# Patient Record
Sex: FEMALE | Race: WHITE | NOT HISPANIC OR LATINO | Employment: UNEMPLOYED | URBAN - METROPOLITAN AREA
[De-identification: names, ages, dates, MRNs, and addresses within clinical notes are randomized per-mention and may not be internally consistent; named-entity substitution may affect disease eponyms.]

---

## 2017-04-25 ENCOUNTER — HOSPITAL ENCOUNTER (EMERGENCY)
Facility: HOSPITAL | Age: 37
Discharge: HOME | End: 2017-04-25
Admitting: EMERGENCY MEDICINE

## 2017-04-25 ENCOUNTER — APPOINTMENT (EMERGENCY)
Dept: RADIOLOGY | Facility: HOSPITAL | Age: 37
End: 2017-04-25

## 2017-04-25 ENCOUNTER — APPOINTMENT (EMERGENCY)
Dept: CT IMAGING | Facility: HOSPITAL | Age: 37
End: 2017-04-25

## 2017-04-25 VITALS
TEMPERATURE: 98.2 F | SYSTOLIC BLOOD PRESSURE: 108 MMHG | BODY MASS INDEX: 45.19 KG/M2 | HEART RATE: 73 BPM | RESPIRATION RATE: 16 BRPM | WEIGHT: 280 LBS | OXYGEN SATURATION: 98 % | DIASTOLIC BLOOD PRESSURE: 62 MMHG

## 2017-04-25 DIAGNOSIS — S09.90XA HEAD INJURY, ACUTE, WITHOUT LOSS OF CONSCIOUSNESS, INITIAL ENCOUNTER: ICD-10-CM

## 2017-04-25 DIAGNOSIS — S89.92XA LEFT KNEE INJURY, INITIAL ENCOUNTER: Primary | ICD-10-CM

## 2017-04-25 PROCEDURE — 70450 CT HEAD/BRAIN W/O DYE: CPT

## 2017-04-25 PROCEDURE — 73564 X-RAY EXAM KNEE 4 OR MORE: CPT

## 2017-04-25 PROCEDURE — 99284 EMERGENCY DEPT VISIT MOD MDM: CPT

## 2017-04-25 RX ORDER — ACETAMINOPHEN 325 MG/1
TABLET ORAL
Status: COMPLETED
Start: 2017-04-25 | End: 2017-04-25

## 2017-04-25 RX ORDER — ACETAMINOPHEN 325 MG/1
650 TABLET ORAL ONCE
Status: COMPLETED | OUTPATIENT
Start: 2017-04-25 | End: 2017-04-25

## 2017-04-25 RX ADMIN — ACETAMINOPHEN 650 MG: 325 TABLET ORAL at 13:05

## 2017-04-25 RX ADMIN — ACETAMINOPHEN 650 MG: 325 TABLET, FILM COATED ORAL at 13:05

## 2017-06-16 ENCOUNTER — TRANSCRIBE ORDERS (OUTPATIENT)
Dept: LAB | Facility: CLINIC | Age: 37
End: 2017-06-16

## 2017-06-16 ENCOUNTER — GENERIC CONVERSION - ENCOUNTER (OUTPATIENT)
Dept: OTHER | Facility: OTHER | Age: 37
End: 2017-06-16

## 2017-06-16 ENCOUNTER — APPOINTMENT (OUTPATIENT)
Dept: LAB | Facility: CLINIC | Age: 37
End: 2017-06-16
Payer: COMMERCIAL

## 2017-06-16 DIAGNOSIS — E66.01 MORBID (SEVERE) OBESITY DUE TO EXCESS CALORIES (HCC): ICD-10-CM

## 2017-06-16 DIAGNOSIS — R60.0 LOCALIZED EDEMA: ICD-10-CM

## 2017-06-16 DIAGNOSIS — R53.83 OTHER FATIGUE: ICD-10-CM

## 2017-06-16 DIAGNOSIS — E78.5 HYPERLIPIDEMIA: ICD-10-CM

## 2017-06-16 DIAGNOSIS — E55.9 VITAMIN D DEFICIENCY: ICD-10-CM

## 2017-06-16 DIAGNOSIS — D64.9 ANEMIA: ICD-10-CM

## 2017-06-16 LAB
25(OH)D3 SERPL-MCNC: 15.3 NG/ML (ref 30–100)
ALBUMIN SERPL BCP-MCNC: 3.3 G/DL (ref 3.5–5)
ALP SERPL-CCNC: 93 U/L (ref 46–116)
ALT SERPL W P-5'-P-CCNC: 55 U/L (ref 12–78)
ANION GAP SERPL CALCULATED.3IONS-SCNC: 6 MMOL/L (ref 4–13)
AST SERPL W P-5'-P-CCNC: 41 U/L (ref 5–45)
BASOPHILS # BLD AUTO: 0.03 THOUSANDS/ΜL (ref 0–0.1)
BASOPHILS NFR BLD AUTO: 0 % (ref 0–1)
BILIRUB SERPL-MCNC: 0.34 MG/DL (ref 0.2–1)
BUN SERPL-MCNC: 10 MG/DL (ref 5–25)
CALCIUM SERPL-MCNC: 9.2 MG/DL (ref 8.3–10.1)
CHLORIDE SERPL-SCNC: 105 MMOL/L (ref 100–108)
CHOLEST SERPL-MCNC: 196 MG/DL (ref 50–200)
CO2 SERPL-SCNC: 29 MMOL/L (ref 21–32)
CREAT SERPL-MCNC: 0.8 MG/DL (ref 0.6–1.3)
EOSINOPHIL # BLD AUTO: 0.17 THOUSAND/ΜL (ref 0–0.61)
EOSINOPHIL NFR BLD AUTO: 2 % (ref 0–6)
ERYTHROCYTE [DISTWIDTH] IN BLOOD BY AUTOMATED COUNT: 14.8 % (ref 11.6–15.1)
EST. AVERAGE GLUCOSE BLD GHB EST-MCNC: 120 MG/DL
GFR SERPL CREATININE-BSD FRML MDRD: >60 ML/MIN/1.73SQ M
GLUCOSE P FAST SERPL-MCNC: 86 MG/DL (ref 65–99)
HBA1C MFR BLD: 5.8 % (ref 4.2–6.3)
HCT VFR BLD AUTO: 43.6 % (ref 34.8–46.1)
HDLC SERPL-MCNC: 31 MG/DL (ref 40–60)
HGB BLD-MCNC: 13.5 G/DL (ref 11.5–15.4)
LDLC SERPL CALC-MCNC: 113 MG/DL (ref 0–100)
LYMPHOCYTES # BLD AUTO: 3.08 THOUSANDS/ΜL (ref 0.6–4.47)
LYMPHOCYTES NFR BLD AUTO: 40 % (ref 14–44)
MCH RBC QN AUTO: 26.1 PG (ref 26.8–34.3)
MCHC RBC AUTO-ENTMCNC: 31 G/DL (ref 31.4–37.4)
MCV RBC AUTO: 84 FL (ref 82–98)
MONOCYTES # BLD AUTO: 0.47 THOUSAND/ΜL (ref 0.17–1.22)
MONOCYTES NFR BLD AUTO: 6 % (ref 4–12)
NEUTROPHILS # BLD AUTO: 3.92 THOUSANDS/ΜL (ref 1.85–7.62)
NEUTS SEG NFR BLD AUTO: 52 % (ref 43–75)
NRBC BLD AUTO-RTO: 0 /100 WBCS
PLATELET # BLD AUTO: 304 THOUSANDS/UL (ref 149–390)
PMV BLD AUTO: 9.2 FL (ref 8.9–12.7)
POTASSIUM SERPL-SCNC: 4 MMOL/L (ref 3.5–5.3)
PROT SERPL-MCNC: 7.5 G/DL (ref 6.4–8.2)
RBC # BLD AUTO: 5.18 MILLION/UL (ref 3.81–5.12)
SODIUM SERPL-SCNC: 140 MMOL/L (ref 136–145)
TRIGL SERPL-MCNC: 261 MG/DL
TSH SERPL DL<=0.05 MIU/L-ACNC: 2.37 UIU/ML (ref 0.36–3.74)
WBC # BLD AUTO: 7.67 THOUSAND/UL (ref 4.31–10.16)

## 2017-06-16 PROCEDURE — 36415 COLL VENOUS BLD VENIPUNCTURE: CPT

## 2017-06-16 PROCEDURE — 82306 VITAMIN D 25 HYDROXY: CPT

## 2017-06-16 PROCEDURE — 80061 LIPID PANEL: CPT

## 2017-06-16 PROCEDURE — 83036 HEMOGLOBIN GLYCOSYLATED A1C: CPT

## 2017-06-16 PROCEDURE — 84443 ASSAY THYROID STIM HORMONE: CPT

## 2017-06-16 PROCEDURE — 80053 COMPREHEN METABOLIC PANEL: CPT

## 2017-06-16 PROCEDURE — 85025 COMPLETE CBC W/AUTO DIFF WBC: CPT

## 2017-06-19 ENCOUNTER — GENERIC CONVERSION - ENCOUNTER (OUTPATIENT)
Dept: OTHER | Facility: OTHER | Age: 37
End: 2017-06-19

## 2017-06-20 ENCOUNTER — ALLSCRIPTS OFFICE VISIT (OUTPATIENT)
Dept: OTHER | Facility: OTHER | Age: 37
End: 2017-06-20

## 2017-06-22 ENCOUNTER — ALLSCRIPTS OFFICE VISIT (OUTPATIENT)
Dept: OTHER | Facility: OTHER | Age: 37
End: 2017-06-22

## 2017-06-28 ENCOUNTER — TRANSCRIBE ORDERS (OUTPATIENT)
Dept: ADMINISTRATIVE | Facility: HOSPITAL | Age: 37
End: 2017-06-28

## 2017-06-28 DIAGNOSIS — J45.909 EXTRINSIC ASTHMA, UNSPECIFIED: Primary | ICD-10-CM

## 2017-07-11 ENCOUNTER — GENERIC CONVERSION - ENCOUNTER (OUTPATIENT)
Dept: OTHER | Facility: OTHER | Age: 37
End: 2017-07-11

## 2017-07-11 ENCOUNTER — APPOINTMENT (OUTPATIENT)
Dept: LAB | Facility: HOSPITAL | Age: 37
End: 2017-07-11
Payer: COMMERCIAL

## 2017-07-11 ENCOUNTER — HOSPITAL ENCOUNTER (OUTPATIENT)
Dept: PULMONOLOGY | Facility: HOSPITAL | Age: 37
Discharge: HOME | End: 2017-07-11
Payer: COMMERCIAL

## 2017-07-11 DIAGNOSIS — R53.83 OTHER FATIGUE: ICD-10-CM

## 2017-07-11 DIAGNOSIS — J45.909 EXTRINSIC ASTHMA, UNSPECIFIED: ICD-10-CM

## 2017-07-11 LAB
FOLATE SERPL-MCNC: >20 NG/ML (ref 3.1–17.5)
VIT B12 SERPL-MCNC: 548 PG/ML (ref 100–900)

## 2017-07-11 PROCEDURE — 36415 COLL VENOUS BLD VENIPUNCTURE: CPT

## 2017-07-11 PROCEDURE — 94726 PLETHYSMOGRAPHY LUNG VOLUMES: CPT

## 2017-07-11 PROCEDURE — 82607 VITAMIN B-12: CPT

## 2017-07-11 PROCEDURE — 94010 BREATHING CAPACITY TEST: CPT

## 2017-07-11 PROCEDURE — 94760 N-INVAS EAR/PLS OXIMETRY 1: CPT

## 2017-07-11 PROCEDURE — 82746 ASSAY OF FOLIC ACID SERUM: CPT

## 2017-07-11 PROCEDURE — 94729 DIFFUSING CAPACITY: CPT

## 2017-07-11 RX ORDER — ALBUTEROL SULFATE 2.5 MG/3ML
2.5 SOLUTION RESPIRATORY (INHALATION) ONCE AS NEEDED
Status: DISCONTINUED | OUTPATIENT
Start: 2017-07-11 | End: 2017-07-15 | Stop reason: HOSPADM

## 2017-07-31 ENCOUNTER — ALLSCRIPTS OFFICE VISIT (OUTPATIENT)
Dept: OTHER | Facility: OTHER | Age: 37
End: 2017-07-31

## 2017-07-31 ENCOUNTER — APPOINTMENT (OUTPATIENT)
Dept: LAB | Facility: CLINIC | Age: 37
End: 2017-07-31
Payer: COMMERCIAL

## 2017-07-31 ENCOUNTER — TRANSCRIBE ORDERS (OUTPATIENT)
Dept: LAB | Facility: CLINIC | Age: 37
End: 2017-07-31

## 2017-07-31 DIAGNOSIS — M79.609 PAIN IN EXTREMITY: ICD-10-CM

## 2017-07-31 DIAGNOSIS — I26.99 OTHER PULMONARY EMBOLISM WITHOUT ACUTE COR PULMONALE (HCC): ICD-10-CM

## 2017-07-31 DIAGNOSIS — R60.0 LOCALIZED EDEMA: ICD-10-CM

## 2017-07-31 DIAGNOSIS — B19.20 VIRAL HEPATITIS C WITHOUT HEPATIC COMA: ICD-10-CM

## 2017-07-31 DIAGNOSIS — R00.2 PALPITATIONS: ICD-10-CM

## 2017-07-31 PROCEDURE — 86038 ANTINUCLEAR ANTIBODIES: CPT

## 2017-07-31 PROCEDURE — 36415 COLL VENOUS BLD VENIPUNCTURE: CPT

## 2017-07-31 PROCEDURE — 87522 HEPATITIS C REVRS TRNSCRPJ: CPT

## 2017-08-02 LAB
HCV RNA SERPL NAA+PROBE-ACNC: NORMAL IU/ML
RYE IGE QN: NEGATIVE
TEST INFORMATION: NORMAL

## 2017-08-06 ENCOUNTER — HOSPITAL ENCOUNTER (EMERGENCY)
Facility: HOSPITAL | Age: 37
Discharge: HOME | End: 2017-08-07
Attending: EMERGENCY MEDICINE
Payer: COMMERCIAL

## 2017-08-06 ENCOUNTER — GENERIC CONVERSION - ENCOUNTER (OUTPATIENT)
Dept: OTHER | Facility: OTHER | Age: 37
End: 2017-08-06

## 2017-08-06 ENCOUNTER — APPOINTMENT (EMERGENCY)
Dept: RADIOLOGY | Facility: HOSPITAL | Age: 37
End: 2017-08-06
Payer: COMMERCIAL

## 2017-08-06 ENCOUNTER — APPOINTMENT (EMERGENCY)
Dept: CT IMAGING | Facility: HOSPITAL | Age: 37
End: 2017-08-06
Payer: COMMERCIAL

## 2017-08-06 DIAGNOSIS — R06.02 SHORTNESS OF BREATH: ICD-10-CM

## 2017-08-06 DIAGNOSIS — L73.9 FOLLICULITIS: ICD-10-CM

## 2017-08-06 DIAGNOSIS — S80.12XA CONTUSION OF LEFT LEG, INITIAL ENCOUNTER: Primary | ICD-10-CM

## 2017-08-06 DIAGNOSIS — S80.12XA TRAUMATIC HEMATOMA OF LEFT LOWER LEG, INITIAL ENCOUNTER: ICD-10-CM

## 2017-08-06 LAB
ALBUMIN SERPL BCP-MCNC: 3.4 G/DL (ref 3.5–5)
ALP SERPL-CCNC: 110 U/L (ref 46–116)
ALT SERPL W P-5'-P-CCNC: 55 U/L (ref 12–78)
ANION GAP SERPL CALCULATED.3IONS-SCNC: 6 MMOL/L (ref 4–13)
APTT PPP: 33 SECONDS (ref 23–35)
AST SERPL W P-5'-P-CCNC: 36 U/L (ref 5–45)
BASOPHILS # BLD AUTO: 0.04 THOUSANDS/ΜL (ref 0–0.1)
BASOPHILS NFR BLD AUTO: 0 % (ref 0–1)
BILIRUB SERPL-MCNC: 0.23 MG/DL (ref 0.2–1)
BUN SERPL-MCNC: 13 MG/DL (ref 5–25)
CALCIUM SERPL-MCNC: 9.2 MG/DL (ref 8.3–10.1)
CHLORIDE SERPL-SCNC: 103 MMOL/L (ref 100–108)
CO2 SERPL-SCNC: 30 MMOL/L (ref 21–32)
CREAT SERPL-MCNC: 0.94 MG/DL (ref 0.6–1.3)
EOSINOPHIL # BLD AUTO: 0.2 THOUSAND/ΜL (ref 0–0.61)
EOSINOPHIL NFR BLD AUTO: 2 % (ref 0–6)
ERYTHROCYTE [DISTWIDTH] IN BLOOD BY AUTOMATED COUNT: 15.1 % (ref 11.6–15.1)
GFR SERPL CREATININE-BSD FRML MDRD: 77 ML/MIN/1.73SQ M
GLUCOSE SERPL-MCNC: 87 MG/DL (ref 65–140)
HCT VFR BLD AUTO: 41.2 % (ref 34.8–46.1)
HGB BLD-MCNC: 13.2 G/DL (ref 11.5–15.4)
INR PPP: 0.96 (ref 0.86–1.16)
LYMPHOCYTES # BLD AUTO: 3.96 THOUSANDS/ΜL (ref 0.6–4.47)
LYMPHOCYTES NFR BLD AUTO: 42 % (ref 14–44)
MCH RBC QN AUTO: 26.6 PG (ref 26.8–34.3)
MCHC RBC AUTO-ENTMCNC: 32 G/DL (ref 31.4–37.4)
MCV RBC AUTO: 83 FL (ref 82–98)
MONOCYTES # BLD AUTO: 0.64 THOUSAND/ΜL (ref 0.17–1.22)
MONOCYTES NFR BLD AUTO: 7 % (ref 4–12)
NEUTROPHILS # BLD AUTO: 4.6 THOUSANDS/ΜL (ref 1.85–7.62)
NEUTS SEG NFR BLD AUTO: 49 % (ref 43–75)
NRBC BLD AUTO-RTO: 0 /100 WBCS
PLATELET # BLD AUTO: 293 THOUSANDS/UL (ref 149–390)
PMV BLD AUTO: 9.4 FL (ref 8.9–12.7)
POTASSIUM SERPL-SCNC: 3.7 MMOL/L (ref 3.5–5.3)
PROT SERPL-MCNC: 7.8 G/DL (ref 6.4–8.2)
PROTHROMBIN TIME: 12.8 SECONDS (ref 12.1–14.4)
RBC # BLD AUTO: 4.96 MILLION/UL (ref 3.81–5.12)
SODIUM SERPL-SCNC: 139 MMOL/L (ref 136–145)
WBC # BLD AUTO: 9.44 THOUSAND/UL (ref 4.31–10.16)

## 2017-08-06 PROCEDURE — 80053 COMPREHEN METABOLIC PANEL: CPT | Performed by: EMERGENCY MEDICINE

## 2017-08-06 PROCEDURE — 73590 X-RAY EXAM OF LOWER LEG: CPT

## 2017-08-06 PROCEDURE — 96374 THER/PROPH/DIAG INJ IV PUSH: CPT

## 2017-08-06 PROCEDURE — 85025 COMPLETE CBC W/AUTO DIFF WBC: CPT | Performed by: EMERGENCY MEDICINE

## 2017-08-06 PROCEDURE — 85730 THROMBOPLASTIN TIME PARTIAL: CPT | Performed by: EMERGENCY MEDICINE

## 2017-08-06 PROCEDURE — 71275 CT ANGIOGRAPHY CHEST: CPT

## 2017-08-06 PROCEDURE — 96361 HYDRATE IV INFUSION ADD-ON: CPT

## 2017-08-06 PROCEDURE — 36415 COLL VENOUS BLD VENIPUNCTURE: CPT | Performed by: EMERGENCY MEDICINE

## 2017-08-06 PROCEDURE — 85610 PROTHROMBIN TIME: CPT | Performed by: EMERGENCY MEDICINE

## 2017-08-06 RX ORDER — KETOROLAC TROMETHAMINE 30 MG/ML
30 INJECTION, SOLUTION INTRAMUSCULAR; INTRAVENOUS ONCE
Status: COMPLETED | OUTPATIENT
Start: 2017-08-06 | End: 2017-08-06

## 2017-08-06 RX ADMIN — KETOROLAC TROMETHAMINE 30 MG: 30 INJECTION, SOLUTION INTRAMUSCULAR at 22:37

## 2017-08-06 RX ADMIN — SODIUM CHLORIDE 1000 ML: 0.9 INJECTION, SOLUTION INTRAVENOUS at 22:35

## 2017-08-06 RX ADMIN — IOHEXOL 100 ML: 350 INJECTION, SOLUTION INTRAVENOUS at 23:39

## 2017-08-07 VITALS
RESPIRATION RATE: 18 BRPM | HEART RATE: 69 BPM | BODY MASS INDEX: 48.26 KG/M2 | TEMPERATURE: 98.3 F | WEIGHT: 293 LBS | DIASTOLIC BLOOD PRESSURE: 59 MMHG | SYSTOLIC BLOOD PRESSURE: 100 MMHG | OXYGEN SATURATION: 97 %

## 2017-08-07 PROCEDURE — 99284 EMERGENCY DEPT VISIT MOD MDM: CPT

## 2017-08-11 ENCOUNTER — APPOINTMENT (EMERGENCY)
Dept: NON INVASIVE DIAGNOSTICS | Facility: HOSPITAL | Age: 37
End: 2017-08-11
Payer: COMMERCIAL

## 2017-08-11 ENCOUNTER — HOSPITAL ENCOUNTER (EMERGENCY)
Facility: HOSPITAL | Age: 37
Discharge: HOME | End: 2017-08-11
Attending: EMERGENCY MEDICINE | Admitting: EMERGENCY MEDICINE
Payer: COMMERCIAL

## 2017-08-11 VITALS
TEMPERATURE: 98.1 F | BODY MASS INDEX: 45.99 KG/M2 | OXYGEN SATURATION: 97 % | RESPIRATION RATE: 18 BRPM | SYSTOLIC BLOOD PRESSURE: 114 MMHG | HEIGHT: 67 IN | DIASTOLIC BLOOD PRESSURE: 67 MMHG | HEART RATE: 86 BPM | WEIGHT: 293 LBS

## 2017-08-11 DIAGNOSIS — L03.116 CELLULITIS OF LEFT LOWER EXTREMITY: Primary | ICD-10-CM

## 2017-08-11 LAB
ANION GAP SERPL CALCULATED.3IONS-SCNC: 7 MMOL/L (ref 4–13)
BASOPHILS # BLD AUTO: 0.06 THOUSANDS/ΜL (ref 0–0.1)
BASOPHILS NFR BLD AUTO: 1 % (ref 0–1)
BUN SERPL-MCNC: 12 MG/DL (ref 5–25)
CALCIUM SERPL-MCNC: 8.9 MG/DL (ref 8.3–10.1)
CHLORIDE SERPL-SCNC: 102 MMOL/L (ref 100–108)
CO2 SERPL-SCNC: 27 MMOL/L (ref 21–32)
CREAT SERPL-MCNC: 0.91 MG/DL (ref 0.6–1.3)
EOSINOPHIL # BLD AUTO: 0.23 THOUSAND/ΜL (ref 0–0.61)
EOSINOPHIL NFR BLD AUTO: 2 % (ref 0–6)
ERYTHROCYTE [DISTWIDTH] IN BLOOD BY AUTOMATED COUNT: 15 % (ref 11.6–15.1)
GFR SERPL CREATININE-BSD FRML MDRD: 80 ML/MIN/1.73SQ M
GLUCOSE SERPL-MCNC: 96 MG/DL (ref 65–140)
HCT VFR BLD AUTO: 39.7 % (ref 34.8–46.1)
HGB BLD-MCNC: 13.2 G/DL (ref 11.5–15.4)
LYMPHOCYTES # BLD AUTO: 3.51 THOUSANDS/ΜL (ref 0.6–4.47)
LYMPHOCYTES NFR BLD AUTO: 37 % (ref 14–44)
MCH RBC QN AUTO: 27.1 PG (ref 26.8–34.3)
MCHC RBC AUTO-ENTMCNC: 33.2 G/DL (ref 31.4–37.4)
MCV RBC AUTO: 82 FL (ref 82–98)
MONOCYTES # BLD AUTO: 0.7 THOUSAND/ΜL (ref 0.17–1.22)
MONOCYTES NFR BLD AUTO: 7 % (ref 4–12)
NEUTROPHILS # BLD AUTO: 5.07 THOUSANDS/ΜL (ref 1.85–7.62)
NEUTS SEG NFR BLD AUTO: 53 % (ref 43–75)
NRBC BLD AUTO-RTO: 0 /100 WBCS
PLATELET # BLD AUTO: 331 THOUSANDS/UL (ref 149–390)
PMV BLD AUTO: 9 FL (ref 8.9–12.7)
POTASSIUM SERPL-SCNC: 3.9 MMOL/L (ref 3.5–5.3)
RBC # BLD AUTO: 4.87 MILLION/UL (ref 3.81–5.12)
SODIUM SERPL-SCNC: 136 MMOL/L (ref 136–145)
WBC # BLD AUTO: 9.58 THOUSAND/UL (ref 4.31–10.16)

## 2017-08-11 PROCEDURE — 85025 COMPLETE CBC W/AUTO DIFF WBC: CPT | Performed by: EMERGENCY MEDICINE

## 2017-08-11 PROCEDURE — 96365 THER/PROPH/DIAG IV INF INIT: CPT

## 2017-08-11 PROCEDURE — 36415 COLL VENOUS BLD VENIPUNCTURE: CPT | Performed by: EMERGENCY MEDICINE

## 2017-08-11 PROCEDURE — 80048 BASIC METABOLIC PNL TOTAL CA: CPT | Performed by: EMERGENCY MEDICINE

## 2017-08-11 PROCEDURE — 93971 EXTREMITY STUDY: CPT

## 2017-08-11 PROCEDURE — 99284 EMERGENCY DEPT VISIT MOD MDM: CPT

## 2017-08-11 RX ORDER — CLINDAMYCIN HYDROCHLORIDE 300 MG/1
300 CAPSULE ORAL EVERY 6 HOURS SCHEDULED
Qty: 40 CAPSULE | Refills: 0 | Status: SHIPPED | OUTPATIENT
Start: 2017-08-11 | End: 2017-08-21

## 2017-08-11 RX ORDER — CLINDAMYCIN PHOSPHATE 600 MG/50ML
600 INJECTION INTRAVENOUS ONCE
Status: COMPLETED | OUTPATIENT
Start: 2017-08-11 | End: 2017-08-11

## 2017-08-11 RX ADMIN — CLINDAMYCIN PHOSPHATE 600 MG: 12 INJECTION, SOLUTION INTRAMUSCULAR; INTRAVENOUS at 17:45

## 2017-10-08 ENCOUNTER — HOSPITAL ENCOUNTER (EMERGENCY)
Facility: HOSPITAL | Age: 37
Discharge: HOME | End: 2017-10-08
Attending: EMERGENCY MEDICINE | Admitting: EMERGENCY MEDICINE
Payer: COMMERCIAL

## 2017-10-08 VITALS
SYSTOLIC BLOOD PRESSURE: 106 MMHG | TEMPERATURE: 98.2 F | RESPIRATION RATE: 16 BRPM | BODY MASS INDEX: 46.67 KG/M2 | HEART RATE: 84 BPM | OXYGEN SATURATION: 97 % | WEIGHT: 293 LBS | DIASTOLIC BLOOD PRESSURE: 69 MMHG

## 2017-10-08 DIAGNOSIS — M54.31 RIGHT SIDED SCIATICA: Primary | ICD-10-CM

## 2017-10-08 DIAGNOSIS — I82.401 RIGHT LEG DVT (HCC): ICD-10-CM

## 2017-10-08 DIAGNOSIS — S86.911A KNEE STRAIN, RIGHT, INITIAL ENCOUNTER: ICD-10-CM

## 2017-10-08 PROCEDURE — 99283 EMERGENCY DEPT VISIT LOW MDM: CPT

## 2017-10-08 RX ORDER — LIDOCAINE 50 MG/G
1 PATCH TOPICAL ONCE
Status: DISCONTINUED | OUTPATIENT
Start: 2017-10-08 | End: 2017-10-08 | Stop reason: HOSPADM

## 2017-10-08 RX ORDER — METHOCARBAMOL 500 MG/1
1000 TABLET, FILM COATED ORAL 3 TIMES DAILY PRN
Qty: 30 TABLET | Refills: 0 | Status: SHIPPED | OUTPATIENT
Start: 2017-10-08 | End: 2018-08-05 | Stop reason: ALTCHOICE

## 2017-10-08 RX ORDER — FAMOTIDINE 10 MG
10 TABLET ORAL 2 TIMES DAILY
COMMUNITY
End: 2018-08-05 | Stop reason: ALTCHOICE

## 2017-10-08 RX ORDER — LIDOCAINE 50 MG/G
1 PATCH TOPICAL DAILY
Qty: 15 PATCH | Refills: 0 | Status: SHIPPED | OUTPATIENT
Start: 2017-10-08 | End: 2018-08-05 | Stop reason: ALTCHOICE

## 2017-10-08 RX ORDER — GABAPENTIN 100 MG/1
100 CAPSULE ORAL 3 TIMES DAILY
Qty: 30 CAPSULE | Refills: 0 | Status: SHIPPED | OUTPATIENT
Start: 2017-10-08 | End: 2018-08-05 | Stop reason: ALTCHOICE

## 2017-10-08 RX ADMIN — LIDOCAINE 1 PATCH: 50 PATCH CUTANEOUS at 15:11

## 2017-10-08 NOTE — ED PROVIDER NOTES
History  Chief Complaint   Patient presents with    Knee Injury     pt states that she fell on her right knee on thrusday and was seen at [place] [address] for the injury   pt states that during the eval they found a blood clot in her right lower leg  pt now c/o sever burning and pain in her right knee and it is hard to walk or bend the knee       61-year-old female presents for evaluation of right knee pain which began approximately 6 days ago  The patient states that she had fallen getting out of the car and landed directly on her flexed right knee  She was seen at [facility] on October 4th where she was diagnosed with a blood clot in the leg  There was no evidence of fracture or dislocation on the x-rays  The patient was already on Eliquis for previous history of blood clot  The patient denies any shortness of breath and she had a CT of her chest which also ruled out PE up on reading the records from [facility]  The patient has persistent worsening pain of the anterior aspect of the right knee and right shin which she describes as burning  There is some associated anterior swelling  The patient also has some associated pain on the lateral aspect of her right thigh  She states the pain is worse with movement and ambulation  History provided by:  Patient      Prior to Admission Medications   Prescriptions Last Dose Informant Patient Reported? Taking? apixaban (ELIQUIS) 5 mg   Yes No   Sig: Take 5 mg by mouth 2 (two) times a day     famotidine (PEPCID) 10 mg tablet   Yes Yes   Sig: Take 10 mg by mouth 2 (two) times a day   methadone (DOLOPHINE) 10 mg tablet   Yes No   Sig: Take 150 mg by mouth daily,   mupirocin (BACTROBAN) 2 % ointment   No No   Sig: Apply topically 3 (three) times a day for 7 days      Facility-Administered Medications: None       Past Medical History:   Diagnosis Date    Asthma     Hx of blood clots     Psoriasis     Spinal stenosis        Past Surgical History:   Procedure Laterality Date    APPENDECTOMY      BACK SURGERY       SECTION      x 3    HYSTERECTOMY         History reviewed  No pertinent family history  I have reviewed and agree with the history as documented  Social History   Substance Use Topics    Smoking status: Current Some Day Smoker    Smokeless tobacco: Never Used    Alcohol use No        Review of Systems   Constitutional: Negative for chills, fatigue and fever  HENT: Negative for sore throat  Eyes: Negative for visual disturbance  Respiratory: Negative for shortness of breath  Cardiovascular: Negative for chest pain  Gastrointestinal: Positive for abdominal pain  Negative for diarrhea, nausea and vomiting  Genitourinary: Negative for difficulty urinating, dysuria and pelvic pain  Musculoskeletal: Positive for arthralgias, gait problem and joint swelling  Negative for back pain  Skin: Negative for rash  Neurological: Negative for syncope, weakness and headaches  All other systems reviewed and are negative  Physical Exam  ED Triage Vitals [10/08/17 1432]   Temperature Pulse Respirations Blood Pressure SpO2   98 2 °F (36 8 °C) 84 16 106/69 97 %      Temp Source Heart Rate Source Patient Position - Orthostatic VS BP Location FiO2 (%)   Tymp Core Monitor Sitting Right arm --      Pain Score       9           Physical Exam   Constitutional: She is oriented to person, place, and time  She appears well-developed and well-nourished  No distress  HENT:   Head: Normocephalic and atraumatic  Right Ear: External ear normal    Left Ear: External ear normal    Eyes: Conjunctivae and EOM are normal  Pupils are equal, round, and reactive to light  No scleral icterus  Neck: Normal range of motion  Cardiovascular: Normal rate, regular rhythm and normal heart sounds  Pulmonary/Chest: Effort normal and breath sounds normal  No respiratory distress  Abdominal: Soft   Bowel sounds are normal  There is no tenderness  There is no rebound and no guarding  Musculoskeletal: She exhibits no edema  Right knee: She exhibits decreased range of motion and swelling  She exhibits no ecchymosis, no deformity and no bony tenderness  Neurological: She is alert and oriented to person, place, and time  Skin: Skin is warm and dry  No rash noted  Psychiatric: She has a normal mood and affect  Nursing note and vitals reviewed  ED Medications  Medications   lidocaine (LIDODERM) 5 % patch 1 patch (not administered)       Diagnostic Studies  Labs Reviewed - No data to display    No orders to display       Procedures  Procedures      Phone Contacts  ED Phone Contact    ED Course  ED Course                                MDM  Number of Diagnoses or Management Options  Knee strain, right, initial encounter: new and requires workup  Right leg DVT [facility]): minor  Right sided sciatica: new and requires workup  Diagnosis management comments: The patient (and any family present) verbalized understanding of the discharge instructions and warnings that would necessitate return to the Emergency Department  All questions were answered prior to discharge  Amount and/or Complexity of Data Reviewed  Review and summarize past medical records: yes (Patient seen in the emergency department [address] [place] [address] on 10/04 with negative x-rays of the knee and tibia  A CT of the chest was negative  The patient had a a DVT identified in the right lower extremity which was old or recurrent)      CritCare Time    Disposition  Final diagnoses:   Right sided sciatica   Right leg DVT ([address] )   Knee strain, right, initial encounter     ED Disposition     ED Disposition Condition Comment    Discharge  Zara Alexandre discharge to home/self care      Condition at discharge: Stable        Follow-up Information     Follow up With Specialties Details Why Contact [facility] Medicine Schedule an appointment as soon as possible for a visit For further evaluation [address] [facility] [address]  [phone]          Patient's Medications   Discharge Prescriptions    GABAPENTIN (NEURONTIN) 100 MG CAPSULE    Take 1 capsule by mouth 3 (three) times a day       Start Date: 10/8/2017 End Date: --       Order Dose: 100 mg       Quantity: 30 capsule    Refills: 0    LIDOCAINE (LIDODERM) 5 %    Place 1 patch on the skin daily Remove & Discard patch within 12 hours or as directed by MD       Start Date: 10/8/2017 End Date: --       Order Dose: 1 patch       Quantity: 15 patch    Refills: 0    METHOCARBAMOL (ROBAXIN) 500 MG TABLET    Take 2 tablets by mouth 3 (three) times a day as needed for muscle spasms       Start Date: 10/8/2017 End Date: --       Order Dose: 1,000 mg       Quantity: 30 tablet    Refills: 0     No discharge procedures on file      ED Provider  Electronically Signed by       Rubi Jose DO  10/08/17 5711

## 2017-10-08 NOTE — DISCHARGE INSTRUCTIONS
Sciatica   WHAT YOU NEED TO KNOW:   Sciatica is a condition that causes pain along your sciatic nerve  The sciatic nerve runs from your spine through both sides of your buttocks  It then runs down the back of your thigh, into your lower leg and foot  Your sciatic nerve may be compressed, inflamed, irritated, or stretched  DISCHARGE INSTRUCTIONS:   Medicines:   · NSAIDs:  These medicines decrease swelling and pain  NSAIDs are available without a doctor's order  Ask your healthcare provider which medicine is right for you  Ask how much to take and when to take it  Take as directed  NSAIDs can cause stomach bleeding or kidney problems if not taken correctly  · Acetaminophen: This medicine decreases pain  Acetaminophen is available without a doctor's order  Ask how much to take and when to take it  Follow directions  Acetaminophen can cause liver damage if not taken correctly  · Muscle relaxers  help decrease pain and muscle spasms  · Take your medicine as directed  Contact your healthcare provider if you think your medicine is not helping or if you have side effects  Tell him of her if you are allergic to any medicine  Keep a list of the medicines, vitamins, and herbs you take  Include the amounts, and when and why you take them  Bring the list or the pill bottles to follow-up visits  Carry your medicine list with you in case of an emergency  Follow up with your healthcare provider as directed:  Write down your questions so you remember to ask them during your visits  Manage your symptoms:   · Activity:  Decrease your activity  Do not lift heavy objects or twist your back for at least 6 weeks  Slowly return to your usual activity  · Ice:  Ice helps decrease swelling and pain  Ice may also help prevent tissue damage  Use an ice pack, or put crushed ice in a plastic bag  Cover it with a towel and place it on your low back or leg for 15 to 20 minutes every hour or as directed      · Heat:  Heat helps decrease pain and muscle spasms  Apply heat on the area for 20 to 30 minutes every 2 hours for as many days as directed  · Physical therapy:  You may need to see physical therapist to teach you exercises to help improve movement and strength, and to decrease pain  An occupational therapist teaches you skills to help with your daily activities  · Use assistive devices if directed: You may need to wear back support, such as a back brace  You may need crutches, a cane, or a walker to decrease stress on your lower back and leg muscles  Ask your healthcare provider for more information about assistive devices and how to use them correctly  Self-care:   · Avoid pressure on your back and legs:  Do not  lift heavy objects, or stand or sit for long periods of time  · Lift objects safely:  Keep your back straight and bend your knees when you  an object  Do not bend or twist your back when you lift  · Maintain a healthy weight:  Ask your healthcare provider how much you should weigh  Ask him to help you create a weight loss plan if you are overweight  · Exercise:  Ask your healthcare provider about the best stretching, warmup, and exercise plan for you  Contact your healthcare provider if:   · You have pain in your lower back at night or when resting  · You have pain in your lower back with numbness below the knee  · You have weakness in one leg only  · You have questions or concerns about your condition or care  Return to the emergency department if:   · You have trouble holding back your urine or bowel movements  · You have weakness in both legs  · You have numbness in your groin or buttocks  © 2017 [address] [employer] Information is for End User's use only and may not be sold, redistributed or otherwise used for commercial purposes  All illustrations and images included in CareNotes® are the copyrighted property of [employer]  or Clinton Reeves    The above information is an  only  It is not intended as medical advice for individual conditions or treatments  Talk to your doctor, nurse or pharmacist before following any medical regimen to see if it is safe and effective for you  Knee Sprain   WHAT YOU NEED TO KNOW:   A knee sprain occurs when one or more ligaments in your knee are suddenly stretched or torn  Ligaments are tissues that hold bones together  Ligaments support the knee and keep the joint and bones in the correct position  DISCHARGE INSTRUCTIONS:   Return to the emergency department if:   · Any part of your leg feels cold, numb, or looks pale     Contact your healthcare provider if:   · You have new or increased swelling, bruising, or pain in your knee  · Your symptoms do not improve within 6 weeks, even with treatment  · You have questions or concerns about your condition or care  Medicines:   · NSAIDs , such as ibuprofen, help decrease swelling, pain, and fever  This medicine is available with or without a doctor's order  NSAIDs can cause stomach bleeding or kidney problems in certain people  If you take blood thinner medicine, always ask your healthcare provider if NSAIDs are safe for you  Always read the medicine label and follow directions  · Acetaminophen  decreases pain and fever  It is available without a doctor's order  Ask how much to take and how often to take it  Follow directions  Read the labels of all other medicines you are using to see if they also contain acetaminophen, or ask your doctor or pharmacist  Acetaminophen can cause liver damage if not taken correctly  Do not use more than 4 grams (4,000 milligrams) total of acetaminophen in one day  · Prescription pain medicine  may be given  Ask how to take this medicine safely  · Take your medicine as directed  Contact your healthcare provider if you think your medicine is not helping or if you have side effects   Tell him or her if you are allergic to any medicine  Keep a list of the medicines, vitamins, and herbs you take  Include the amounts, and when and why you take them  Bring the list or the pill bottles to follow-up visits  Carry your medicine list with you in case of an emergency  Self-care:   · Rest  your knee and do not exercise  You may be told to keep weight off your knee  This means that you should not walk on your injured leg  Rest helps decrease swelling and allows the injury to heal  You can do gentle range of motion (ROM) exercises as directed  This will prevent stiffness  · Apply ice  on your knee for 15 to 20 minutes every hour or as directed  Use an ice pack, or put crushed ice in a plastic bag  Cover it with a towel  Ice helps prevent tissue damage and decreases swelling and pain  · Apply compression to your knee as directed  You may need to wear an elastic bandage  This helps keep your injured knee from moving too much while it heals  You can loosen or tighten the elastic bandage to make it comfortable  It should be tight enough for you to feel support  It should not be so tight that it causes your toes to feel numb or tingly  If you are wearing an elastic bandage, take it off and rewrap it once a day  · Elevate your knee  above the level of your heart as often as you can  This will help decrease swelling and pain  Prop your leg on pillows or blankets to keep it elevated comfortably  Do not put pillows directly behind your knee  · Use support devices as directed:  Support devices such as a splint or brace may be needed  These devices limit movement and protect your joint while it heals  You may be given crutches to use until you can stand on your injured leg without pain  Use devices as directed  Physical therapy:  A physical therapist teaches you exercises to help improve movement and strength, and to decrease pain  Prevent another knee sprain:  Exercise your legs to keep your muscles strong   Strong leg muscles help protect your knee and prevent strain  The following may also prevent a knee sprain:  · Slowly start your exercise or training program   Slowly increase the time, distance, and intensity of your exercise  Sudden increases in training may cause you to injure your knee again  · Wear protective braces and equipment as directed  Braces may prevent your knee from moving the wrong way and causing another sprain  Protective equipment may support your bones and ligaments to prevent injury  · Warm up and stretch before exercise  Warm up by walking or using an exercise bike before starting your regular exercise  Do gentle stretches after warming up  This helps to loosen your muscles and decrease stress on your knee  Cool down and stretch after you exercise  · Wear shoes that fit correctly and support your feet  Replace your running or exercise shoes before the padding or shock absorption is worn out  Ask your healthcare provider which exercise shoes are best for you  Ask if you should wear special shoe inserts  Shoe inserts can help support your heels and arches or keep your foot lined up correctly in your shoes  Exercise on flat surfaces  Follow up with your healthcare provider as directed:  Write down your questions so you remember to ask them during your visits  © 2017 [address] [facility] Information is for End User's use only and may not be sold, redistributed or otherwise used for commercial purposes  All illustrations and images included in CareNotes® are the copyrighted property of [employer]  or Clinton Reeves  The above information is an  only  It is not intended as medical advice for individual conditions or treatments  Talk to your doctor, nurse or pharmacist before following any medical regimen to see if it is safe and effective for you        Crutch Instructions   WHAT YOU NEED TO KNOW:   Crutches are tools that provide support and balance when you walk  You may need 1 or 2 crutches to help support your body weight  You may need crutches if you had surgery or an injury that affects your ability to walk  DISCHARGE INSTRUCTIONS:   How to use crutches safely:   · Support your weight with your arms and hands  Do not support your weight with your armpits  This could hurt the nerves that are in your underarms  Keep your elbow bent when the crutch is in place under your arm  · Walk slowly and carefully with crutches  Go up and down stairs and ramps slowly, and stop to rest when you feel tired  Get up slowly to a sitting or standing position  This will help prevent dizziness and fainting  Use your crutches only on firm ground  Use caution when you walk on ice or snow  Wet or waxed floors and smooth cement floors can be slippery  Watch out for small rugs or cords  How to walk with crutches:   · Place both crutches under your arms, and place your hands on the hand  of the crutches  Place your crutches slightly in front of you  · The top of the crutches should be about 2 fingers [id number] (about 1½ inches) below your armpits  Place your weight on your hands  The top of the crutches should not press into your armpits  · If you have one leg that is injured, keep it off the floor by bending your knee  · Lift the crutches and move them a step ahead of you  Put the rubber ends of the crutches firmly on the ground  Move the foot that is not injured between the crutches  Place that heel down first     · If you are using your crutches for balance, move your right foot and left crutch forward  Then move your left foot and right crutch forward  Keep walking this way  How to go up stairs with crutches:   · Face the stairs  Put the crutches close to the first step  · Push onto the crutches and put your uninjured leg on the first step  · Put your weight on your uninjured leg that is on the first step   Bring both crutches and the injured leg onto the step at the same time  · When you hold onto a railing with one arm, put both crutches under the other arm  Use the railing to help you go up stairs  How to go down stairs with crutches:   · Stand with the toes of your uninjured leg close to the edge of the step  · Bend the knee of your uninjured leg  Slowly lower both crutches along with the injured leg onto the next step  · Lean on your crutches  Slowly lower your uninjured leg onto the same step  · Place both crutches under one arm while you hold onto the railing with the other arm  How to sit in a chair with crutches:   · Turn and back up to the chair until you feel the edge of it against the back of your legs  Keep your injured leg forward  · Take your crutches out from under your arms  Sit while bending your uninjured knee  How to get up from a chair with crutches:   · Sit on the edge of your chair  · Push up with your hands using the crutches or arms of the chair  Put your weight on your uninjured foot as you get up  · Keep your injured leg bent at the knee and off the floor  Contact your healthcare provider if:   · Your crutches do not fit  · One crutch is longer than the other  · Your crutches break or get lost     · The rubber tips of your crutches are split or loose  · You get blisters or painful calluses on your hands or armpits  · Your armpit is red, sore, or has bumps or pimples  · Your arm muscles get weaker the longer you use the crutches  · You have questions or concerns about your condition or care  Return to the emergency department if:   · You have sudden numbness in a hand or arm  · Your fingers feel cold or have cramping pain  © 2017 [address] [employer]  Information is for End User's use only and may not be sold, redistributed or otherwise used for commercial purposes   All illustrations and images included in CareNotes® are the copyrighted property of [employer]  or Clinton Reeves  The above information is an  only  It is not intended as medical advice for individual conditions or treatments  Talk to your doctor, nurse or pharmacist before following any medical regimen to see if it is safe and effective for you

## 2018-01-12 VITALS
WEIGHT: 292 LBS | HEART RATE: 97 BPM | RESPIRATION RATE: 20 BRPM | DIASTOLIC BLOOD PRESSURE: 64 MMHG | OXYGEN SATURATION: 98 % | SYSTOLIC BLOOD PRESSURE: 108 MMHG | TEMPERATURE: 98 F | HEIGHT: 66 IN | BODY MASS INDEX: 46.93 KG/M2

## 2018-01-12 NOTE — RESULT NOTES
Discussion/Summary     Hepatitis C virus levels were not noted on recent test   She was exposed to hepatitis C in the past, but did not develop the illness chronically  No follow up for this is needed  Verified Results  (1) HEP C RNA PCR, QUANTITATIVE [id number] 10:49AM Danitza Valdez Order Number: [id number]     Test Name Result Flag Reference   HCV PCR QUANTITATIVE      HCV Not Detected IU/mL   TEST INFORMATION Comment     The quantitative range of this assay is 15 IU/mL to 100 million IU/mL  Performed at:  [address] [place] [address]  [id number]  : Joe Avina MD, Phone:  [phone]       Signatures   Electronically signed by : Ruperto Zheng, [facility];  Aug  6 2017  3:20PM EST                       (Author)

## 2018-01-14 VITALS
SYSTOLIC BLOOD PRESSURE: 112 MMHG | OXYGEN SATURATION: 98 % | HEIGHT: 66 IN | BODY MASS INDEX: 47.09 KG/M2 | RESPIRATION RATE: 20 BRPM | DIASTOLIC BLOOD PRESSURE: 68 MMHG | TEMPERATURE: 98.3 F | WEIGHT: 293 LBS | HEART RATE: 98 BPM

## 2018-01-16 NOTE — MISCELLANEOUS
Provider Comments  Provider Comments:   Patient did not show for 9:20am appt      Signatures   Electronically signed by : ANGEL Regan; Jun 20 2017 11:50AM EST                       (Author)

## 2018-01-18 NOTE — RESULT NOTES
Verified Results  (1) CBC/PLT/DIFF [id number] 09:55AM Lorrie Luna Order Number: [id number]     Test Name Result Flag Reference   WBC COUNT 7 67 Thousand/uL  4 31-10 16   RBC COUNT 5 18 Million/uL H 3 81-5 12   HEMOGLOBIN 13 5 g/dL  11 5-15 4   HEMATOCRIT 43 6 %  34 8-46  1   MCV 84 fL  82-98   MCH 26 1 pg L 26 8-34 3   MCHC 31 0 g/dL L 31 4-37 4   RDW 14 8 %  11 6-15 1   MPV 9 2 fL  8 9-12 7   PLATELET COUNT 162 Thousands/uL  149-390   nRBC AUTOMATED 0 /100 WBCs     NEUTROPHILS RELATIVE PERCENT 52 %  43-75   LYMPHOCYTES RELATIVE PERCENT 40 %  14-44   MONOCYTES RELATIVE PERCENT 6 %  4-12   EOSINOPHILS RELATIVE PERCENT 2 %  0-6   BASOPHILS RELATIVE PERCENT 0 %  0-1   NEUTROPHILS ABSOLUTE COUNT 3 92 Thousands/? ??L  1 85-7 62   LYMPHOCYTES ABSOLUTE COUNT 3 08 Thousands/? ??L  0 60-4 47   MONOCYTES ABSOLUTE COUNT 0 47 Thousand/? ??L  0 17-1 22   EOSINOPHILS ABSOLUTE COUNT 0 17 Thousand/? ??L  0 00-0 61   BASOPHILS ABSOLUTE COUNT 0 03 Thousands/? ??L  0 00-0 10     (1) COMPREHENSIVE METABOLIC PANEL [id number] 11:47FO Donaldo Bui Order Number: [id number]     Test Name Result Flag Reference   SODIUM 140 mmol/L  136-145   POTASSIUM 4 0 mmol/L  3 5-5 3   CHLORIDE 105 mmol/L  100-108   CARBON DIOXIDE 29 mmol/L  21-32   ANION GAP (CALC) 6 mmol/L  4-13   BLOOD UREA NITROGEN 10 mg/dL  5-25   CREATININE 0 80 mg/dL  0 60-1 30   Standardized to IDMS reference method   CALCIUM 9 2 mg/dL  8 3-10 1   BILI, TOTAL 0 34 mg/dL  0 20-1 00   ALK PHOSPHATAS 93 U/L     ALT (SGPT) 55 U/L  12-78   AST(SGOT) 41 U/L  5-45   ALBUMIN 3 3 g/dL L 3 5-5 0   TOTAL PROTEIN 7 5 g/dL  6 4-8 2   eGFR Non-African American      >60 0 ml/min/1 [facility] Disease Education Program recommendations are as follows:  GFR calculation is accurate only with a steady state creatinine  Chronic Kidney disease less than 60 ml/min/1 73 sq  meters  Kidney failure less than 15 ml/min/1 73 sq  meters     GLUCOSE FASTING 86 mg/dL 65-99     (1) LIPID PANEL, FASTING 16Jun2017 09:55AM Gene Luna Order Number: [id number]     Test Name Result Flag Reference   CHOLESTEROL 196 mg/dL     HDL,DIRECT 31 mg/dL L 40-60   Specimen collection should occur prior to Metamizole administration due to the potential for falsely depressed results  LDL CHOLESTEROL CALCULATED 113 mg/dL H 0-100   Triglyceride:         Normal              <150 mg/dl       Borderline High    150-199 mg/dl       High               200-499 mg/dl       Very High          >499 mg/dl  Cholesterol:         Desirable        <200 mg/dl      Borderline High  200-239 mg/dl      High             >239 mg/dl  HDL Cholesterol:        High    >59 mg/dL      Low     <41 mg/dL  LDL CALCULATED:    This screening LDL is a calculated result  It does not have the accuracy of the Direct Measured LDL in the monitoring of patients with hyperlipidemia and/or statin therapy  Direct Measure LDL (PNO786) must be ordered separately in these patients  TRIGLYCERIDES 261 mg/dL H <=150   Specimen collection should occur prior to N-Acetylcysteine or Metamizole administration due to the potential for falsely depressed results  (1) HEMOGLOBIN A1C [id number] 09:55AM Saadia Holloway Order Number: [id number]     Test Name Result Flag Reference   HEMOGLOBIN A1C 5 8 %  4 2-6 3   EST  AVG  GLUCOSE 120 mg/dl       (1) TSH WITH FT4 REFLEX 16Jun2017 09:55AM Gene Luna Order Number: [id number]     Test Name Result Flag Reference   TSH 2 370 uIU/mL  0 358-3 740   Patients undergoing fluorescein dye angiography may retain small amounts of fluorescein in the body for 48-72 hours post procedure  Samples containing fluorescein can produce falsely depressed TSH values  If the patient had this procedure,a specimen should be resubmitted post fluorescein clearance            The recommended reference ranges for TSH during pregnancy are as follows:  First trimester 0 1 to 2 5 uIU/mL  Second trimester  0 2 to 3 0 uIU/mL  Third trimester 0 3 to 3 0 uIU/m     (1) VITAMIN D 25-HYDROXY [id number] 09:55AM Vincent Luna Order Number: [id number]     Test Name Result Flag Reference   VIT D 25-HYDROX 15 3 ng/mL L 30 0-100 0   This assay is a certified procedure of the CDC Vitamin D Standardization Certification Program (VDSCP)     Deficiency <20ng/ml   Insufficiency 20-30ng/ml   Sufficient  ng/ml     *Patients undergoing fluorescein dye angiography may retain small amounts of fluorescein in the body for 48-72 hours post procedure  Samples containing fluorescein can produce falsely elevated Vitamin D values  If the patient had this procedure, a specimen should be resubmitted post fluorescein clearance

## 2018-01-22 VITALS
WEIGHT: 293 LBS | TEMPERATURE: 97.3 F | OXYGEN SATURATION: 99 % | RESPIRATION RATE: 20 BRPM | DIASTOLIC BLOOD PRESSURE: 70 MMHG | HEIGHT: 66 IN | BODY MASS INDEX: 47.09 KG/M2 | HEART RATE: 78 BPM | SYSTOLIC BLOOD PRESSURE: 102 MMHG

## 2018-03-23 LAB
ABSOL LYMPHOCYTES (HISTORICAL): 4.1 K/UL (ref 0.5–4)
ANION GAP SERPL CALCULATED.3IONS-SCNC: 12 MMOL/L (ref 5–14)
BASOPHILS # BLD AUTO: 0.1 K/UL (ref 0–0.1)
BASOPHILS # BLD AUTO: 1 % (ref 0–1)
BILIRUB UR QL STRIP: NEGATIVE MG/DL
BUN SERPL-MCNC: 11 MG/DL (ref 5–25)
CALCIUM SERPL-MCNC: 9.1 MG/DL (ref 8.4–10.2)
CHLORIDE SERPL-SCNC: 103 MEQ/L (ref 97–108)
CLARITY UR: ABNORMAL
CO2 SERPL-SCNC: 25 MMOL/L (ref 22–30)
COLOR UR: YELLOW
COMMENT (HISTORICAL): ABNORMAL
COMMENT (HISTORICAL): ABNORMAL
CREATINE, SERUM (HISTORICAL): 0.6 MG/DL (ref 0.6–1.2)
DEPRECATED RDW RBC AUTO: 16.1 %
EGFR (HISTORICAL): >60 ML/MIN/1.73 M2
EOSINOPHIL # BLD AUTO: 0.2 K/UL (ref 0–0.4)
EOSINOPHIL NFR BLD AUTO: 2 % (ref 0–6)
GLUCOSE SERPL-MCNC: 89 MG/DL (ref 70–99)
GLUCOSE UR STRIP-MCNC: NEGATIVE MG/DL
HCT VFR BLD AUTO: 41.7 % (ref 36–46)
HGB BLD-MCNC: 13.4 G/DL (ref 12–16)
HGB UR QL STRIP.AUTO: 250
KETONES UR STRIP-MCNC: NEGATIVE MG/DL
LEUKOCYTE ESTERASE UR QL STRIP: 25
LYMPHOCYTES NFR BLD AUTO: 43 % (ref 25–45)
MCH RBC QN AUTO: 25.2 PG (ref 26–34)
MCHC RBC AUTO-ENTMCNC: 32.1 % (ref 31–36)
MCV RBC AUTO: 78 FL (ref 80–100)
MONOCYTES # BLD AUTO: 0.7 K/UL (ref 0.2–0.9)
MONOCYTES NFR BLD AUTO: 7 % (ref 1–10)
NEUTROPHILS ABS COUNT (HISTORICAL): 4.5 K/UL (ref 1.8–7.8)
NEUTS SEG NFR BLD AUTO: 47 % (ref 45–65)
NITRITE UR QL STRIP: NEGATIVE
PH UR STRIP.AUTO: 5 [PH] (ref 4.5–8)
PLATELET # BLD AUTO: 323 K/MCL (ref 150–450)
POTASSIUM SERPL-SCNC: 4.1 MEQ/L (ref 3.6–5)
PREGNANCY TEST URINE (HISTORICAL): NEGATIVE
PROT UR STRIP-MCNC: 15 MG/DL
RBC # BLD AUTO: 5.32 M/MCL (ref 4–5.2)
RBC MORPHOLOGY (HISTORICAL): ABNORMAL
SODIUM SERPL-SCNC: 140 MEQ/L (ref 137–147)
SP GR UR STRIP.AUTO: 1.02 (ref 1–1.04)
UROBILINOGEN UR QL STRIP.AUTO: NEGATIVE MG/DL (ref 0–1)
WBC # BLD AUTO: 9.6 K/MCL (ref 4.5–11)

## 2018-05-18 LAB
ALBUMIN SERPL BCP-MCNC: 3.9 G/DL (ref 3–5.2)
ALP SERPL-CCNC: 94 U/L (ref 43–122)
ALT SERPL W P-5'-P-CCNC: 65 U/L (ref 9–52)
ANION GAP SERPL CALCULATED.3IONS-SCNC: 10 MMOL/L (ref 5–14)
AST SERPL W P-5'-P-CCNC: 74 U/L (ref 14–36)
BILIRUB SERPL-MCNC: 0.3 MG/DL
BUN SERPL-MCNC: 13 MG/DL (ref 5–25)
CALCIUM SERPL-MCNC: 9.4 MG/DL (ref 8.4–10.2)
CHLORIDE SERPL-SCNC: 102 MEQ/L (ref 97–108)
CHOLEST SERPL-MCNC: 182 MG/DL
CHOLEST/HDLC SERPL: 5.2 {RATIO}
CO2 SERPL-SCNC: 27 MMOL/L (ref 22–30)
CREATINE, SERUM (HISTORICAL): 0.76 MG/DL (ref 0.6–1.2)
EGFR (HISTORICAL): >60 ML/MIN/1.73 M2
EST. AVERAGE GLUCOSE BLD GHB EST-MCNC: 123 MG/DL
GLUCOSE SERPL-MCNC: 85 MG/DL (ref 70–99)
HBA1C MFR BLD HPLC: 5.9 %
HDLC SERPL-MCNC: 35 MG/DL
LDL/HDL RATIO (HISTORICAL): 2.9
LDLC SERPL CALC-MCNC: 102 MG/DL
POTASSIUM SERPL-SCNC: 4.1 MEQ/L (ref 3.6–5)
SODIUM SERPL-SCNC: 140 MEQ/L (ref 137–147)
TOTAL PROTEIN (HISTORICAL): 7.2 G/DL (ref 5.9–8.4)
TRIGL SERPL-MCNC: 224 MG/DL
TSH SERPL DL<=0.05 MIU/L-ACNC: 3.03 UIU/ML (ref 0.47–4.68)
VLDLC SERPL CALC-MCNC: 45 MG/DL (ref 0–40)

## 2018-08-05 ENCOUNTER — HOSPITAL ENCOUNTER (EMERGENCY)
Facility: HOSPITAL | Age: 38
Discharge: HOME | End: 2018-08-05
Attending: EMERGENCY MEDICINE | Admitting: EMERGENCY MEDICINE
Payer: COMMERCIAL

## 2018-08-05 VITALS
HEART RATE: 84 BPM | BODY MASS INDEX: 48.71 KG/M2 | WEIGHT: 293 LBS | TEMPERATURE: 97.3 F | OXYGEN SATURATION: 98 % | DIASTOLIC BLOOD PRESSURE: 69 MMHG | SYSTOLIC BLOOD PRESSURE: 100 MMHG | RESPIRATION RATE: 18 BRPM

## 2018-08-05 DIAGNOSIS — L03.90 CELLULITIS: Primary | ICD-10-CM

## 2018-08-05 LAB — GLUCOSE SERPL-MCNC: 111 MG/DL (ref 70–99)

## 2018-08-05 PROCEDURE — 82948 REAGENT STRIP/BLOOD GLUCOSE: CPT

## 2018-08-05 PROCEDURE — 99283 EMERGENCY DEPT VISIT LOW MDM: CPT

## 2018-08-05 RX ORDER — CEPHALEXIN 500 MG/1
500 CAPSULE ORAL EVERY 8 HOURS SCHEDULED
Qty: 30 CAPSULE | Refills: 0 | Status: SHIPPED | OUTPATIENT
Start: 2018-08-05 | End: 2018-08-15

## 2018-08-05 RX ORDER — SULFAMETHOXAZOLE AND TRIMETHOPRIM 800; 160 MG/1; MG/1
1 TABLET ORAL 2 TIMES DAILY
Qty: 20 TABLET | Refills: 0 | Status: SHIPPED | OUTPATIENT
Start: 2018-08-05 | End: 2018-08-15

## 2018-08-05 RX ORDER — CEPHALEXIN 500 MG/1
500 CAPSULE ORAL ONCE
Status: COMPLETED | OUTPATIENT
Start: 2018-08-05 | End: 2018-08-05

## 2018-08-05 RX ORDER — SULFAMETHOXAZOLE AND TRIMETHOPRIM 800; 160 MG/1; MG/1
1 TABLET ORAL ONCE
Status: COMPLETED | OUTPATIENT
Start: 2018-08-05 | End: 2018-08-05

## 2018-08-05 RX ORDER — CEPHALEXIN 500 MG/1
CAPSULE ORAL
Status: DISCONTINUED
Start: 2018-08-05 | End: 2018-08-05 | Stop reason: HOSPADM

## 2018-08-05 RX ORDER — SULFAMETHOXAZOLE AND TRIMETHOPRIM 800; 160 MG/1; MG/1
TABLET ORAL
Status: DISCONTINUED
Start: 2018-08-05 | End: 2018-08-05 | Stop reason: HOSPADM

## 2018-08-05 RX ADMIN — SULFAMETHOXAZOLE AND TRIMETHOPRIM 1 TABLET: 800; 160 TABLET ORAL at 20:57

## 2018-08-05 RX ADMIN — CEPHALEXIN 500 MG: 500 CAPSULE ORAL at 20:57

## 2018-08-06 NOTE — DISCHARGE INSTRUCTIONS
Cellulitis, Ambulatory Care   GENERAL INFORMATION:   Cellulitis  is a skin infection caused by bacteria  Common symptoms include the following:   · Fever    · A red, warm, swollen area on your skin    · Pain when the area is touched    · Bumps or blisters (abscess) that may drain pus    · Bumpy, raised skin that feels like an orange peel  Seek immediate care for the following symptoms:   · An increase in pain, redness, warmth, and size    · Red streaks coming from the infected area    · A thin, gray-brown discharge coming from your infected skin area    · A crackling under your skin when you touch it    · Purple dots or bumps on your skin, or bleeding under your skin    · New swelling and pain in your legs    · Sudden trouble breathing or chest pain  Treatment for cellulitis  may include medicines to treat the bacterial infection or decrease pain  The infection may need to be cleaned out  Damaged, dead, or infected tissue may need to be cut away to help your wound heal   Manage your symptoms:   · Elevate your wound above the level of your heart  as often as you can  This will help decrease swelling and pain  Prop your wound on pillows or blankets to keep it elevated comfortably  · Clean your wound as directed  You may need to wash the wound with soap and water  Look for signs of infection  · Wear pressure stockings as directed  The stockings are tight and put pressure on your legs  This improves blood flow and decreases swelling  Prevent cellulitis:   · Wash your hands often  Use soap and water  Wash your hands after you use the bathroom, change a child's diapers, or sneeze  Wash your hands before you prepare or eat food  Use lotion to prevent dry, cracked skin  · Do not share personal items, such as towels, clothing, and razors  · Clean exercise equipment  with germ-killing  before and after you use it    Follow up with your healthcare provider as directed:  Write down your questions so you remember to ask them during your visits  CARE AGREEMENT:   You have the right to help plan your care  Learn about your health condition and how it may be treated  Discuss treatment options with your caregivers to decide what care you want to receive  You always have the right to refuse treatment  The above information is an  only  It is not intended as medical advice for individual conditions or treatments  Talk to your doctor, nurse or pharmacist before following any medical regimen to see if it is safe and effective for you  © 2014 [address] is for End User's use only and may not be sold, redistributed or otherwise used for commercial purposes  All illustrations and images included in CareNotes® are the copyrighted property of [employer]  or Clinton Reeves

## 2018-08-06 NOTE — ED PROVIDER NOTES
History  Chief Complaint   Patient presents with    Abscess     "I have had these pus pockets that are coming out on my back and on my abdomen  I called the doctor today and they told me to come over here since I am diabetic " Pt has open area to lower back and abscesses to lower abdomen  History provided by:  Patient   used: No    Medical Problem   Location:  Pt with low back rash/sore for 2 weeks  Severity:  Mild  Onset quality:  Gradual  Duration:  2 weeks  Timing:  Constant  Progression:  Unchanged  Chronicity:  New  Associated symptoms: no abdominal pain, no chest pain, no congestion, no cough, no diarrhea, no ear pain, no fatigue, no fever, no headaches, no loss of consciousness, no myalgias, no nausea, no rash, no rhinorrhea, no shortness of breath, no sore throat, no vomiting and no wheezing        Prior to Admission Medications   Prescriptions Last Dose Informant Patient Reported? Taking? apixaban (ELIQUIS) 5 mg   Yes Yes   Sig: Take 5 mg by mouth 2 (two) times a day  Facility-Administered Medications: None       Past Medical History:   Diagnosis Date    Asthma     Hx of blood clots     Psoriasis     Spinal stenosis        Past Surgical History:   Procedure Laterality Date    APPENDECTOMY      BACK SURGERY       SECTION      x 3    HYSTERECTOMY         History reviewed  No pertinent family history  I have reviewed and agree with the history as documented  Social History   Substance Use Topics    Smoking status: Current Some Day Smoker    Smokeless tobacco: Never Used    Alcohol use No        Review of Systems   Constitutional: Negative  Negative for fatigue and fever  HENT: Negative  Negative for congestion, ear pain, rhinorrhea and sore throat  Eyes: Negative  Respiratory: Negative  Negative for cough, shortness of breath and wheezing  Cardiovascular: Negative  Negative for chest pain  Gastrointestinal: Negative    Negative for abdominal pain, diarrhea, nausea and vomiting  Endocrine: Negative  Genitourinary: Negative  Musculoskeletal: Negative  Negative for myalgias  Skin: Negative for rash  Back sores x 2 and abdomen sores also    Allergic/Immunologic: Negative  Neurological: Negative  Negative for loss of consciousness and headaches  Hematological: Negative  Psychiatric/Behavioral: Negative  All other systems reviewed and are negative  Physical Exam  Physical Exam   Constitutional: She appears well-developed and well-nourished  HENT:   Head: Normocephalic and atraumatic  Right Ear: External ear normal    Left Ear: External ear normal    Nose: Nose normal    Mouth/Throat: Oropharynx is clear and moist    Eyes: Conjunctivae are normal  Pupils are equal, round, and reactive to light  Neck: Normal range of motion  Neck supple  Cardiovascular: Normal rate, regular rhythm and normal heart sounds  Pulmonary/Chest: Effort normal and breath sounds normal    Abdominal: Soft  Musculoskeletal: Normal range of motion  Neurological: She is alert  Skin: Skin is warm  Back midline 2 areas 1 cm erythema and tender  Not fluctuant   Abdomen several pimple areas pea size  Distal neuro and vascular wnl    Psychiatric: She has a normal mood and affect  Her behavior is normal  Thought content normal    Nursing note and vitals reviewed        Vital Signs  ED Triage Vitals [08/05/18 2008]   Temperature Pulse Respirations Blood Pressure SpO2   (!) 97 3 °F (36 3 °C) 84 18 100/69 98 %      Temp src Heart Rate Source Patient Position - Orthostatic VS BP Location FiO2 (%)   -- -- Sitting Left arm --      Pain Score       --           Vitals:    08/05/18 2008   BP: 100/69   Pulse: 84   Patient Position - Orthostatic VS: Sitting       Visual Acuity      ED Medications  Medications   sulfamethoxazole-trimethoprim (BACTRIM DS) 800-160 mg per tablet 1 tablet (1 tablet Oral Given 8/5/18 2057)   cephalexin (Raenell Mention) capsule 500 mg (500 mg Oral Given 8/5/18 2057)       Diagnostic Studies  Results Reviewed     Procedure Component Value Units Date/Time    Fingerstick Glucose (POCT) [[id number]]  (Abnormal) Collected:  08/05/18 2029    Lab Status:  Final result Updated:  08/05/18 2040     POC Glucose 111 (H) mg/dl                  No orders to display              Procedures  Procedures       Phone Contacts  ED Phone Contact    ED Course                               MDM  CritCare Time    Disposition  Final diagnoses:   Cellulitis     Time reflects when diagnosis was documented in both MDM as applicable and the Disposition within this note     Time User Action Codes Description Comment    8/5/2018  8:46 PM [address] [S26 30] Cellulitis       ED Disposition     ED Disposition Condition Comment    Discharge  Igor Jackson discharge to home/self care      Condition at discharge: Good        Follow-up Information     Follow up With Specialties Details Why [address] [place] [address] Emergency Department Emergency Medicine In 2 days  [address] [address] [phone]          Discharge Medication List as of 8/5/2018  8:47 PM      START taking these medications    Details   cephalexin (KEFLEX) 500 mg capsule Take 1 capsule (500 mg total) by mouth every 8 (eight) hours for 10 days, Starting Sun 8/5/2018, Until Wed 8/15/2018, Print      sulfamethoxazole-trimethoprim (BACTRIM DS) 800-160 mg per tablet Take 1 tablet by mouth 2 (two) times a day for 10 days smx-tmp DS (BACTRIM) 800-160 mg tabs (1tab q12 D10), Starting Sun 8/5/2018, Until Wed 8/15/2018, Print         CONTINUE these medications which have NOT CHANGED    Details   apixaban (ELIQUIS) 5 mg Take 5 mg by mouth 2 (two) times a day , Until Discontinued, Historical Med      famotidine (PEPCID) 10 mg tablet Take 10 mg by mouth 2 (two) times a day, Historical Med      gabapentin (NEURONTIN) 100 mg capsule Take 1 capsule by mouth 3 (three) times a day, Starting Sun 10/8/2017, Normal      lidocaine (LIDODERM) 5 % Place 1 patch on the skin daily Remove & Discard patch within 12 hours or as directed by MD, Starting Sun 10/8/2017, Normal      methadone (DOLOPHINE) 10 mg tablet Take 150 mg by mouth daily,, Until Discontinued, Historical Med      methocarbamol (ROBAXIN) 500 mg tablet Take 2 tablets by mouth 3 (three) times a day as needed for muscle spasms, Starting Sun 10/8/2017, Normal      mupirocin (BACTROBAN) 2 % ointment Apply topically 3 (three) times a day for 7 days, Starting Mon 8/7/2017, Until Mon 8/14/2017, Print           No discharge procedures on file      ED Provider  Electronically Signed by           Jose Mahmood PA-C  08/06/18 [id number]

## 2018-11-01 ENCOUNTER — HOSPITAL ENCOUNTER (EMERGENCY)
Facility: HOSPITAL | Age: 38
Discharge: HOME | End: 2018-11-01
Attending: EMERGENCY MEDICINE | Admitting: EMERGENCY MEDICINE
Payer: COMMERCIAL

## 2018-11-01 VITALS
BODY MASS INDEX: 45.99 KG/M2 | OXYGEN SATURATION: 99 % | SYSTOLIC BLOOD PRESSURE: 138 MMHG | HEIGHT: 67 IN | DIASTOLIC BLOOD PRESSURE: 81 MMHG | HEART RATE: 73 BPM | WEIGHT: 293 LBS | RESPIRATION RATE: 20 BRPM | TEMPERATURE: 98.2 F

## 2018-11-01 DIAGNOSIS — L03.90 CELLULITIS: Primary | ICD-10-CM

## 2018-11-01 LAB — GLUCOSE SERPL-MCNC: 114 MG/DL (ref 70–99)

## 2018-11-01 PROCEDURE — 82948 REAGENT STRIP/BLOOD GLUCOSE: CPT

## 2018-11-01 PROCEDURE — 99283 EMERGENCY DEPT VISIT LOW MDM: CPT

## 2018-11-01 RX ORDER — SULFAMETHOXAZOLE AND TRIMETHOPRIM 800; 160 MG/1; MG/1
TABLET ORAL
Status: COMPLETED
Start: 2018-11-01 | End: 2018-11-01

## 2018-11-01 RX ORDER — CEPHALEXIN 500 MG/1
500 CAPSULE ORAL EVERY 8 HOURS SCHEDULED
Qty: 30 CAPSULE | Refills: 0 | Status: SHIPPED | OUTPATIENT
Start: 2018-11-01 | End: 2018-11-11

## 2018-11-01 RX ORDER — CEPHALEXIN 500 MG/1
CAPSULE ORAL
Status: COMPLETED
Start: 2018-11-01 | End: 2018-11-01

## 2018-11-01 RX ORDER — TRAMADOL HYDROCHLORIDE 50 MG/1
50 TABLET ORAL EVERY 6 HOURS PRN
Qty: 12 TABLET | Refills: 0 | Status: SHIPPED | OUTPATIENT
Start: 2018-11-01 | End: 2018-11-04

## 2018-11-01 RX ORDER — SULFAMETHOXAZOLE AND TRIMETHOPRIM 800; 160 MG/1; MG/1
1 TABLET ORAL 2 TIMES DAILY
Qty: 20 TABLET | Refills: 0 | Status: SHIPPED | OUTPATIENT
Start: 2018-11-01 | End: 2018-11-11

## 2018-11-01 RX ORDER — SULFAMETHOXAZOLE AND TRIMETHOPRIM 800; 160 MG/1; MG/1
1 TABLET ORAL ONCE
Status: COMPLETED | OUTPATIENT
Start: 2018-11-01 | End: 2018-11-01

## 2018-11-01 RX ORDER — IBUPROFEN 600 MG/1
600 TABLET ORAL ONCE
Status: DISCONTINUED | OUTPATIENT
Start: 2018-11-01 | End: 2018-11-01

## 2018-11-01 RX ORDER — CEPHALEXIN 500 MG/1
500 CAPSULE ORAL ONCE
Status: COMPLETED | OUTPATIENT
Start: 2018-11-01 | End: 2018-11-01

## 2018-11-01 RX ADMIN — SULFAMETHOXAZOLE AND TRIMETHOPRIM 1 TABLET: 800; 160 TABLET ORAL at 16:46

## 2018-11-01 RX ADMIN — CEPHALEXIN 500 MG: 500 CAPSULE ORAL at 16:45

## 2018-11-01 NOTE — DISCHARGE INSTRUCTIONS
Cellulitis   WHAT YOU NEED TO KNOW:   Cellulitis is a skin infection caused by bacteria  Cellulitis may go away on its own or you may need treatment  Your healthcare provider may draw a [place] around the outside edges of your cellulitis  If your cellulitis spreads, your healthcare provider will see it outside of the [place]  DISCHARGE INSTRUCTIONS:   Call 911 if:   · You have sudden trouble breathing or chest pain  Return to the emergency department if:   · Your wound gets larger and more painful  · You feel a crackling under your skin when you touch it  · You have purple dots or bumps on your skin, or you see bleeding under your skin  · You have new swelling and pain in your legs  · The red, warm, swollen area gets larger  · You see red streaks coming from the infected area  Contact your healthcare provider if:   · You have a fever  · Your fever or pain does not go away or gets worse  · The area does not get smaller after 2 days of antibiotics  · Your skin is flaking or peeling off  · You have questions or concerns about your condition or care  Medicines:   · Antibiotics  help treat the bacterial infection  · NSAIDs , such as ibuprofen, help decrease swelling, pain, and fever  NSAIDs can cause stomach bleeding or kidney problems in certain people  If you take blood thinner medicine, always ask if NSAIDs are safe for you  Always read the medicine label and follow directions  Do not give these medicines to children under 10months of age without direction from your child's healthcare provider  · Acetaminophen  decreases pain and fever  It is available without a doctor's order  Ask how much to take and how often to take it  Follow directions  Read the labels of all other medicines you are using to see if they also contain acetaminophen, or ask your doctor or pharmacist  Acetaminophen can cause liver damage if not taken correctly   Do not use more than 4 grams (4,000 milligrams) total of acetaminophen in one day  · Take your medicine as directed  Contact your healthcare provider if you think your medicine is not helping or if you have side effects  Tell him or her if you are allergic to any medicine  Keep a list of the medicines, vitamins, and herbs you take  Include the amounts, and when and why you take them  Bring the list or the pill bottles to follow-up visits  Carry your medicine list with you in case of an emergency  Self-care:   · Elevate the area above the level of your heart  as often as you can  This will help decrease swelling and pain  Prop the area on pillows or blankets to keep it elevated comfortably  · Clean the area daily until the wound scabs over  Gently wash the area with soap and water  Pat dry  Use dressings as directed  · Place cool or warm, wet cloths on the area as directed  Use clean cloths and clean water  Leave it on the area until the cloth is room temperature  Pat the area dry with a clean, dry cloth  The cloths may help decrease pain  Prevent cellulitis:   · Do not scratch bug bites or areas of injury  You increase your risk for cellulitis by scratching these areas  · Do not share personal items, such as towels, clothing, and razors  · Clean exercise equipment  with germ-killing  before and after you use it  · Wash your hands often  Use soap and water  Wash your hands after you use the bathroom, change a child's diapers, or sneeze  Wash your hands before you prepare or eat food  Use lotion to prevent dry, cracked skin  · Wear pressure stockings as directed  You may be told to wear the stockings if you have peripheral edema  The stockings improve blood flow and decrease swelling  · Treat athlete's foot  This can help prevent the spread of a bacterial skin infection  Follow up with your healthcare provider within 3 days, or as directed:   Your healthcare provider will check if your cellulitis is getting better  You may need different medicine  Write down your questions so you remember to ask them during your visits  © 2017 [address] Information is for End User's use only and may not be sold, redistributed or otherwise used for commercial purposes  All illustrations and images included in CareNotes® are the copyrighted property of A D A M , Inc  or Clinton Reeves  The above information is an  only  It is not intended as medical advice for individual conditions or treatments  Talk to your doctor, nurse or pharmacist before following any medical regimen to see if it is safe and effective for you

## 2018-11-01 NOTE — ED PROVIDER NOTES
History  Chief Complaint   Patient presents with    Skin Irritation     Pt states she was seen here about 2 months ago with an abcess and was told it was most likely MRSA  Pt arrives today with generalized lesions, draining green secretions  Pt states shes been getting hot and cold with nausea  History provided by:  Patient   used: No    Medical Problem   Location:  Pt with sores to arms legs torso   Severity:  Mild  Onset quality:  Gradual  Duration:  5 days  Timing:  Constant  Progression:  Unchanged  Chronicity:  Recurrent  Associated symptoms: rash    Associated symptoms: no abdominal pain, no chest pain, no congestion, no cough, no diarrhea, no ear pain, no fatigue, no fever, no headaches, no loss of consciousness, no myalgias, no nausea, no rhinorrhea, no shortness of breath, no sore throat, no vomiting and no wheezing        Prior to Admission Medications   Prescriptions Last Dose Informant Patient Reported? Taking? apixaban (ELIQUIS) 5 mg   Yes No   Sig: Take 5 mg by mouth 2 (two) times a day  Facility-Administered Medications: None       Past Medical History:   Diagnosis Date    Asthma     Hx of blood clots     Psoriasis     Spinal stenosis        Past Surgical History:   Procedure Laterality Date    APPENDECTOMY      BACK SURGERY       SECTION      x 3    HYSTERECTOMY  2015    Radical       History reviewed  No pertinent family history  I have reviewed and agree with the history as documented  Social History   Substance Use Topics    Smoking status: Current Some Day Smoker     Packs/day: 0 25     Types: Cigarettes    Smokeless tobacco: Never Used    Alcohol use No        Review of Systems   Constitutional: Negative  Negative for fatigue and fever  HENT: Negative  Negative for congestion, ear pain, rhinorrhea and sore throat  Eyes: Negative  Respiratory: Negative  Negative for cough, shortness of breath and wheezing      Cardiovascular: Negative  Negative for chest pain  Gastrointestinal: Negative  Negative for abdominal pain, diarrhea, nausea and vomiting  Endocrine: Negative  Genitourinary: Negative  Musculoskeletal: Negative  Negative for myalgias  Skin: Positive for rash  Allergic/Immunologic: Negative  Neurological: Negative  Negative for loss of consciousness and headaches  Hematological: Negative  Psychiatric/Behavioral: Negative  All other systems reviewed and are negative  Physical Exam  Physical Exam   Constitutional: She is oriented to person, place, and time  She appears well-developed and well-nourished  HENT:   Head: Normocephalic  Right Ear: External ear normal    Left Ear: External ear normal    Mouth/Throat: Oropharynx is clear and moist    Eyes: Pupils are equal, round, and reactive to light  Conjunctivae and EOM are normal    Neck: Normal range of motion  Neck supple  Cardiovascular: Normal rate, regular rhythm and normal heart sounds  Pulmonary/Chest: Effort normal and breath sounds normal    Abdominal: Bowel sounds are normal    Musculoskeletal: Normal range of motion  Neurological: She is alert and oriented to person, place, and time  Skin: Skin is warm    numberous marble size erythema areas  3 large marble to groin and stomach area    None fluctuant    Psychiatric: She has a normal mood and affect  Her behavior is normal    Nursing note and vitals reviewed        Vital Signs  ED Triage Vitals [11/01/18 1525]   Temperature Pulse Respirations Blood Pressure SpO2   98 2 °F (36 8 °C) 73 20 138/81 99 %      Temp Source Heart Rate Source Patient Position - Orthostatic VS BP Location FiO2 (%)   Tympanic Monitor Sitting Left arm --      Pain Score       7           Vitals:    11/01/18 1525   BP: 138/81   Pulse: 73   Patient Position - Orthostatic VS: Sitting       Visual Acuity      ED Medications  Medications   sulfamethoxazole-trimethoprim (BACTRIM DS) 800-160 mg per tablet 1 tablet (1 tablet Oral Given 11/1/18 1646)   cephalexin (KEFLEX) capsule 500 mg (500 mg Oral Given 11/1/18 1645)       Diagnostic Studies  Results Reviewed     Procedure Component Value Units Date/Time    Fingerstick Glucose (POCT) [[id number]]  (Abnormal) Collected:  11/01/18 1613    Lab Status:  Final result Updated:  11/01/18 1624     POC Glucose 114 (H) mg/dl                  No orders to display              Procedures  Procedures       Phone Contacts  ED Phone Contact    ED Course                               MDM  CritCare Time    Disposition  Final diagnoses:   Cellulitis     Time reflects when diagnosis was documented in both MDM as applicable and the Disposition within this note     Time User Action Codes Description Comment    11/1/2018  4:31 PM Tevin Mcdermott, [place] [T89 [id number]] Cellulitis       ED Disposition     ED Disposition Condition Comment    Discharge  [place] discharge to home/self care      Condition at discharge: Good        Follow-up Information     Follow up With Specialties Details [facility] Emergency [facility] In 2 days  [address] [address] [phone]          Discharge Medication List as of 11/1/2018  4:34 PM      START taking these medications    Details   cephalexin (KEFLEX) 500 mg capsule Take 1 capsule (500 mg total) by mouth every 8 (eight) hours for 10 days, Starting Thu 11/1/2018, Until Sun 11/11/2018, Print      sulfamethoxazole-trimethoprim (BACTRIM DS) 800-160 mg per tablet Take 1 tablet by mouth 2 (two) times a day for 10 days smx-tmp DS (BACTRIM) 800-160 mg tabs (1tab q12 D10), Starting Thu 11/1/2018, Until Sun 11/11/2018, Print      traMADol (ULTRAM) 50 mg tablet Take 1 tablet (50 mg total) by mouth every 6 (six) hours as needed (pain) for up to 3 days, Starting Thu 11/1/2018, Until Sun 11/4/2018, Print         CONTINUE these medications which have NOT CHANGED    Details   apixaban (ELIQUIS) 5 mg Take 5 mg by mouth 2 (two) times a day , Until Discontinued, Historical Med           No discharge procedures on file      ED Provider  Electronically Signed by           Krystle Zurita PA-C  11/01/18 9773

## 2018-12-12 ENCOUNTER — HOSPITAL ENCOUNTER (EMERGENCY)
Facility: HOSPITAL | Age: 38
Discharge: HOME | End: 2018-12-12
Attending: EMERGENCY MEDICINE
Payer: COMMERCIAL

## 2018-12-12 ENCOUNTER — APPOINTMENT (EMERGENCY)
Dept: CT IMAGING | Facility: HOSPITAL | Age: 38
End: 2018-12-12
Payer: COMMERCIAL

## 2018-12-12 VITALS
RESPIRATION RATE: 20 BRPM | TEMPERATURE: 98.1 F | DIASTOLIC BLOOD PRESSURE: 59 MMHG | HEART RATE: 80 BPM | OXYGEN SATURATION: 98 % | SYSTOLIC BLOOD PRESSURE: 123 MMHG

## 2018-12-12 DIAGNOSIS — G43.909 MIGRAINE HEADACHE: Primary | ICD-10-CM

## 2018-12-12 LAB
ALBUMIN SERPL BCP-MCNC: 3.2 G/DL (ref 3.5–5)
ALP SERPL-CCNC: 83 U/L (ref 46–116)
ALT SERPL W P-5'-P-CCNC: 49 U/L (ref 12–78)
ANION GAP SERPL CALCULATED.3IONS-SCNC: 11 MMOL/L (ref 4–13)
AST SERPL W P-5'-P-CCNC: 53 U/L (ref 5–45)
ATRIAL RATE: 81 BPM
BASOPHILS # BLD AUTO: 0.05 THOUSANDS/ΜL (ref 0–0.1)
BASOPHILS NFR BLD AUTO: 1 % (ref 0–1)
BILIRUB SERPL-MCNC: 0.52 MG/DL (ref 0.2–1)
BUN SERPL-MCNC: 14 MG/DL (ref 5–25)
CALCIUM SERPL-MCNC: 9.2 MG/DL (ref 8.3–10.1)
CHLORIDE SERPL-SCNC: 101 MMOL/L (ref 100–108)
CO2 SERPL-SCNC: 26 MMOL/L (ref 21–32)
CREAT SERPL-MCNC: 0.84 MG/DL (ref 0.6–1.3)
EOSINOPHIL # BLD AUTO: 0.12 THOUSAND/ΜL (ref 0–0.61)
EOSINOPHIL NFR BLD AUTO: 1 % (ref 0–6)
ERYTHROCYTE [DISTWIDTH] IN BLOOD BY AUTOMATED COUNT: 14.8 % (ref 11.6–15.1)
GFR SERPL CREATININE-BSD FRML MDRD: 87 ML/MIN/1.73SQ M
GLUCOSE SERPL-MCNC: 123 MG/DL (ref 65–140)
HCT VFR BLD AUTO: 45 % (ref 34.8–46.1)
HGB BLD-MCNC: 13.5 G/DL (ref 11.5–15.4)
IMM GRANULOCYTES # BLD AUTO: 0.02 THOUSAND/UL (ref 0–0.2)
IMM GRANULOCYTES NFR BLD AUTO: 0 % (ref 0–2)
LYMPHOCYTES # BLD AUTO: 2.72 THOUSANDS/ΜL (ref 0.6–4.47)
LYMPHOCYTES NFR BLD AUTO: 30 % (ref 14–44)
MCH RBC QN AUTO: 25.5 PG (ref 26.8–34.3)
MCHC RBC AUTO-ENTMCNC: 30 G/DL (ref 31.4–37.4)
MCV RBC AUTO: 85 FL (ref 82–98)
MONOCYTES # BLD AUTO: 0.38 THOUSAND/ΜL (ref 0.17–1.22)
MONOCYTES NFR BLD AUTO: 4 % (ref 4–12)
NEUTROPHILS # BLD AUTO: 5.7 THOUSANDS/ΜL (ref 1.85–7.62)
NEUTS SEG NFR BLD AUTO: 64 % (ref 43–75)
NRBC BLD AUTO-RTO: 0 /100 WBCS
P AXIS: 70 DEGREES
PLATELET # BLD AUTO: 325 THOUSANDS/UL (ref 149–390)
PMV BLD AUTO: 8.9 FL (ref 8.9–12.7)
POTASSIUM SERPL-SCNC: 5 MMOL/L (ref 3.5–5.3)
PR INTERVAL: 178 MS
PROT SERPL-MCNC: 7.7 G/DL (ref 6.4–8.2)
QRS AXIS: 22 DEGREES
QRSD INTERVAL: 84 MS
QT INTERVAL: 366 MS
QTC INTERVAL: 425 MS
RBC # BLD AUTO: 5.29 MILLION/UL (ref 3.81–5.12)
SODIUM SERPL-SCNC: 138 MMOL/L (ref 136–145)
T WAVE AXIS: 44 DEGREES
VENTRICULAR RATE: 81 BPM
WBC # BLD AUTO: 8.99 THOUSAND/UL (ref 4.31–10.16)

## 2018-12-12 PROCEDURE — 96374 THER/PROPH/DIAG INJ IV PUSH: CPT

## 2018-12-12 PROCEDURE — 96375 TX/PRO/DX INJ NEW DRUG ADDON: CPT

## 2018-12-12 PROCEDURE — 93010 ELECTROCARDIOGRAM REPORT: CPT | Performed by: INTERNAL MEDICINE

## 2018-12-12 PROCEDURE — 70450 CT HEAD/BRAIN W/O DYE: CPT

## 2018-12-12 PROCEDURE — 36415 COLL VENOUS BLD VENIPUNCTURE: CPT | Performed by: EMERGENCY MEDICINE

## 2018-12-12 PROCEDURE — 96361 HYDRATE IV INFUSION ADD-ON: CPT

## 2018-12-12 PROCEDURE — 99284 EMERGENCY DEPT VISIT MOD MDM: CPT

## 2018-12-12 PROCEDURE — 93005 ELECTROCARDIOGRAM TRACING: CPT

## 2018-12-12 PROCEDURE — 80053 COMPREHEN METABOLIC PANEL: CPT | Performed by: EMERGENCY MEDICINE

## 2018-12-12 PROCEDURE — 85025 COMPLETE CBC W/AUTO DIFF WBC: CPT | Performed by: EMERGENCY MEDICINE

## 2018-12-12 RX ORDER — MORPHINE SULFATE 4 MG/ML
4 INJECTION, SOLUTION INTRAMUSCULAR; INTRAVENOUS ONCE
Status: COMPLETED | OUTPATIENT
Start: 2018-12-12 | End: 2018-12-12

## 2018-12-12 RX ORDER — KETOROLAC TROMETHAMINE 30 MG/ML
30 INJECTION, SOLUTION INTRAMUSCULAR; INTRAVENOUS ONCE
Status: COMPLETED | OUTPATIENT
Start: 2018-12-12 | End: 2018-12-12

## 2018-12-12 RX ORDER — NAPROXEN 500 MG/1
500 TABLET ORAL 2 TIMES DAILY WITH MEALS
Qty: 20 TABLET | Refills: 0 | Status: SHIPPED | OUTPATIENT
Start: 2018-12-12 | End: 2018-12-12 | Stop reason: ALTCHOICE

## 2018-12-12 RX ADMIN — KETOROLAC TROMETHAMINE 30 MG: 30 INJECTION, SOLUTION INTRAMUSCULAR at 10:23

## 2018-12-12 RX ADMIN — MORPHINE SULFATE 4 MG: 4 INJECTION, SOLUTION INTRAMUSCULAR; INTRAVENOUS at 09:49

## 2018-12-12 RX ADMIN — SODIUM CHLORIDE 500 ML: 0.9 INJECTION, SOLUTION INTRAVENOUS at 09:45

## 2018-12-12 NOTE — ED PROVIDER NOTES
History  Chief Complaint   Patient presents with    Numbness     Patient reporting pain behind the R eye and R sided facial numbness  Also reporting bilateral numbness to the hands  C/o headache / pain behind her R eye started yest  Am around 5:30am (10/10 pain), constant since then  Then she started to feel R sided facial numbness and b/l hand numbness  No h/o HTN or migraine headaches  Pt  Hardly ever gets headaches  She took motrin 800mg with didn't help  No n/v/d  No fevers, no neck pain  No head injury  Pt  Is on eliquis for dvt/PE  Prior to Admission Medications   Prescriptions Last Dose Informant Patient Reported? Taking? apixaban (ELIQUIS) 5 mg   Yes Yes   Sig: Take 5 mg by mouth 2 (two) times a day  Facility-Administered Medications: None       Past Medical History:   Diagnosis Date    Asthma     Hx of blood clots     Psoriasis     Spinal stenosis        Past Surgical History:   Procedure Laterality Date    APPENDECTOMY      BACK SURGERY       SECTION      x 3    HYSTERECTOMY  2015    Radical       History reviewed  No pertinent family history  I have reviewed and agree with the history as documented  Social History   Substance Use Topics    Smoking status: Current Some Day Smoker     Packs/day: 0 25     Types: Cigarettes    Smokeless tobacco: Never Used    Alcohol use No        Review of Systems   Constitutional: Negative for appetite change, fatigue and fever  HENT: Negative for rhinorrhea and sore throat  Eyes: Negative for pain  Respiratory: Negative for cough, shortness of breath and wheezing  Cardiovascular: Negative for chest pain and leg swelling  Gastrointestinal: Negative for abdominal pain, diarrhea and vomiting  Genitourinary: Negative for dysuria and flank pain  Musculoskeletal: Negative for back pain and neck pain  Skin: Negative for rash  Neurological: Positive for numbness and headaches  Negative for syncope  Psychiatric/Behavioral:        Mood normal       Physical Exam  Physical Exam   Constitutional: She is oriented to person, place, and time  She appears well-developed and well-nourished  HENT:   Head: Normocephalic and atraumatic  Mouth/Throat: Oropharynx is clear and moist    Eyes: Pupils are equal, round, and reactive to light  Neck: Normal range of motion  Neck supple  nontender   Cardiovascular: Normal rate and regular rhythm  Pulmonary/Chest: Effort normal and breath sounds normal    Abdominal: Soft  There is no tenderness  Musculoskeletal: Normal range of motion  Neurological: She is alert and oriented to person, place, and time  Mild decreased sensation over R face and b/l hands, pulses 4/4,no weakness in ext  Skin: Skin is warm and dry  Nursing note and vitals reviewed        Vital Signs  ED Triage Vitals [12/12/18 0930]   Temperature Pulse Respirations Blood Pressure SpO2   98 1 °F (36 7 °C) 80 20 123/59 98 %      Temp Source Heart Rate Source Patient Position - Orthostatic VS BP Location FiO2 (%)   Oral Monitor Sitting -- --      Pain Score       Worst Possible Pain           Vitals:    12/12/18 0930   BP: 123/59   Pulse: 80   Patient Position - Orthostatic VS: Sitting       Visual Acuity  Visual Acuity      Most Recent Value   L Pupil Size (mm)  3   R Pupil Size (mm)  3          ED Medications  Medications   sodium chloride 0 9 % bolus 500 mL (0 mL Intravenous Stopped 12/12/18 1115)   morphine (PF) 4 mg/mL injection 4 mg (4 mg Intravenous Given 12/12/18 0949)   ketorolac (TORADOL) injection 30 mg (30 mg Intravenous Given 12/12/18 1023)       Diagnostic Studies  Results Reviewed     Procedure Component Value Units Date/Time    Comprehensive metabolic panel [[id number]]  (Abnormal) Collected:  12/12/18 0944    Lab Status:  Final result Specimen:  Blood from Arm, Right Updated:  12/12/18 1012     Sodium 138 mmol/L      Potassium 5 0 mmol/L      Chloride 101 mmol/L      CO2 26 mmol/L ANION GAP 11 mmol/L      BUN 14 mg/dL      Creatinine 0 84 mg/dL      Glucose 123 mg/dL      Calcium 9 2 mg/dL      AST 53 (H) U/L      ALT 49 U/L      Alkaline Phosphatase 83 U/L      Total Protein 7 7 g/dL      Albumin 3 2 (L) g/dL      Total Bilirubin 0 52 mg/dL      eGFR 87 ml/min/1 73sq m     Narrative:         National Kidney Disease Education Program recommendations are as follows:  GFR calculation is accurate only with a steady state creatinine  Chronic Kidney disease less than 60 ml/min/1 73 sq  meters  Kidney failure less than 15 ml/min/1 73 sq  meters  CBC and differential [[id number]]  (Abnormal) Collected:  12/12/18 0944    Lab Status:  Final result Specimen:  Blood from Arm, Right Updated:  12/12/18 0954     WBC 8 99 Thousand/uL      RBC 5 29 (H) Million/uL      Hemoglobin 13 5 g/dL      Hematocrit 45 0 %      MCV 85 fL      MCH 25 5 (L) pg      MCHC 30 0 (L) g/dL      RDW 14 8 %      MPV 8 9 fL      Platelets 660 Thousands/uL      nRBC 0 /100 WBCs      Neutrophils Relative 64 %      Immat GRANS % 0 %      Lymphocytes Relative 30 %      Monocytes Relative 4 %      Eosinophils Relative 1 %      Basophils Relative 1 %      Neutrophils Absolute 5 70 Thousands/µL      Immature Grans Absolute 0 02 Thousand/uL      Lymphocytes Absolute 2 72 Thousands/µL      Monocytes Absolute 0 38 Thousand/µL      Eosinophils Absolute 0 12 Thousand/µL      Basophils Absolute 0 05 Thousands/µL                  CT head without contrast   Final Result by Annelise Multani MD (12/12 6176)      No acute intracranial abnormality                    Workstation performed: [id number]                    Procedures  ECG 12 Lead Documentation  Date/Time: 12/12/2018 9:35 AM  Performed by: HALIMA Obrien  Authorized by: HALIMA Obrien     Rate:     ECG rate:  81    ECG rate assessment: normal    Rhythm:     Rhythm: sinus rhythm    Comments:      No st elevation or depression           Phone Contacts  ED Phone Contact    ED Course                               MDM  Number of Diagnoses or Management Options  Migraine headache:      Amount and/or Complexity of Data Reviewed  Clinical lab tests: ordered and reviewed  Tests in the radiology section of CPT®: ordered and reviewed    Risk of Complications, Morbidity, and/or Mortality  Presenting problems: moderate  General comments: Patient felt slightly better after meds given  I spoke to Dr Tessie Philippe, neurologist on call, who agrees with management and states he would not do any further radiographic studies at this time  She is stable for outpatient follow-up with neurologist for complex migraines causing right facial numbness  The right facial numbness resolved in the ER after pain meds  Will hold on any NSAIDs given that the patient is on Eliquis  She is allergic to the acetaminophen in Percocet so I wrote her for a few tablets of oxycodone for pain control at home      CritCare Time    Disposition  Final diagnoses:   Migraine headache     Time reflects when diagnosis was documented in both MDM as applicable and the Disposition within this note     Time User Action Codes Description Comment    12/12/2018 11:00 AM Zeenat Russell [[id number]] Migraine headache       ED Disposition     ED Disposition Condition Comment    Discharge  [place] Wilde discharge to home/self care      Condition at discharge: Stable        Follow-up Information     Follow up With Specialties Details Why Contact Info    Reji Brady MD [facility] Medicine   [address]  [place]  [address]  [address]  [phone]      Balaji Multani DO Neurology   [address] [place] [address],[address] [facility]  [phone]            Patient's Medications   Discharge Prescriptions    OXYCODONE HCL 5 MG TABA    Take 5 mg by mouth every 6 (six) hours as needed (pain) for up to 10 days Max Daily Amount: 20 mg       Start Date: 12/12/2018End Date: 12/22/2018       Order Dose: 5 mg Quantity: 15 each    Refills: 0     No discharge procedures on file      ED Provider  Electronically Signed by           Adonay Damon MD  12/12/18 0377

## 2018-12-12 NOTE — DISCHARGE INSTRUCTIONS
Migraine Headache   WHAT YOU SHOULD KNOW:   A migraine is a severe headache  The pain can be so severe that it interferes with your daily activities  A migraine can last a few hours up to several days  The exact cause of migraines is not known  It may be caused by changes in your body chemicals and extra sensitive nerves in your brain  AFTER YOU LEAVE:   Medicines:  Take medicine as soon as you feel a migraine begin  · Pain medicine: You may need medicine to take away or decrease pain  You may need a doctor's order for this medicine  Do not wait until the pain is severe before you take your medicine  · Migraine medicines: These are used to help prevent a migraine or stop it once it starts  · Antinausea medicine: This medicine may be given to calm your stomach and to help prevent vomiting  They can also help relieve pain  · Take your medicine as directed  Call your healthcare provider if you think your medicine is not helping or if you have side effects  Tell him if you are allergic to any medicine  Keep a list of the medicines, vitamins, and herbs you take  Include the amounts, and when and why you take them  Bring the list or the pill bottles to follow-up visits  Carry your medicine list with you in case of an emergency  Manage your symptoms:   · Rest:  Rest in a dark, quiet room  This will help decrease your pain  · Ice:  Ice helps decrease pain  Use an ice pack or put crushed ice in a plastic bag  Cover the ice pack with a towel and place it on your head where it hurts for 15 to 20 minutes every hour  · Heat:  Heat helps decrease pain and muscle spasms  Use a small towel dampened with warm water or a heating pad, or sit in a warm bath  Apply heat on the area for 20 to 30 minutes every 2 hours  You may alternate heat and ice  Keep a headache diary:  Write down when your migraines start and stop  Include your symptoms and what you were doing when a migraine began   Record what you ate or drank for 24 hours before the migraine started  Describe the pain and where it hurts  Keep track of what you did to treat your migraine and whether it worked  Follow up with your primary healthcare provider or neurologist as directed:  Bring your headache diary with you when you see your primary healthcare provider  Write down your questions so you remember to ask them during your visits  Prevent another migraine:   · Do not smoke: If you smoke, it is never too late to quit  Tobacco smoke can trigger a migraine  It can also cause heart disease, lung disease, cancer, and other health problems  Quitting smoking will improve your health and the health of those around you  If you smoke, ask for information about how to stop  · Do not drink alcohol:  Alcohol can trigger a migraine  It can also interfere with the medicines used to treat your migraine  · Get regular exercise:  Exercise may help prevent migraines  Talk to your primary healthcare provider about the best exercise plan for you  · Manage stress:  Stress may trigger a migraine  Learn new ways to relax, such as deep breathing  · Stick to a sleep schedule:  Go to bed and get up at the same time each day  · Eat regular meals:  Include healthy foods such as include fruit, vegetables, whole-grain breads, low-fat dairy products, beans, lean meat, and fish  Avoid trigger foods like chocolate, hard cheese, and red wine  Foods that contain gluten, nitrates, MSG, or artificial sweeteners may also trigger migraines  Caffeine, which is often used to treat migraines, can also trigger them  Contact your primary healthcare provider or neurologist if:   · You have a fever  · Your migraines interfere with your daily activities  · Your medicines or treatments stop working  · You have questions about your condition or care    Seek care immediately or call 911 if:   · You have a headache that seems different or much worse than your usual migraine headache  · You have a severe headache with a fever or a stiff neck  · You have new problems with speech, vision, balance, or movement  · You feel like you are going to faint, you become confused, or you have a seizure  © 2014 [address] is for End User's use only and may not be sold, redistributed or otherwise used for commercial purposes  All illustrations and images included in CareNotes® are the copyrighted property of A D A M , Inc  or Clinton Reeves  The above information is an  only  It is not intended as medical advice for individual conditions or treatments  Talk to your doctor, nurse or pharmacist before following any medical regimen to see if it is safe and effective for you

## 2018-12-12 NOTE — SOCIAL WORK
Received p/c from this patient indicating unable to "fill" prescription, needing prior authorization,  [facility] [address] [place]  And perform RX contact  This worker was informed insurance will cover 5 days with no needs to complete prior authorization   Case consulted with attending  physician issues a new script, patient was informed to come for script,  No other issues reported by patient  [facility] and informed to use code [id number] as instructed by [facility]formed [facility]

## 2019-05-10 ENCOUNTER — APPOINTMENT (EMERGENCY)
Dept: NON INVASIVE DIAGNOSTICS | Facility: HOSPITAL | Age: 39
End: 2019-05-10
Payer: COMMERCIAL

## 2019-05-10 ENCOUNTER — HOSPITAL ENCOUNTER (EMERGENCY)
Facility: HOSPITAL | Age: 39
Discharge: HOME | End: 2019-05-11
Attending: EMERGENCY MEDICINE | Admitting: EMERGENCY MEDICINE
Payer: COMMERCIAL

## 2019-05-10 ENCOUNTER — APPOINTMENT (EMERGENCY)
Dept: RADIOLOGY | Facility: HOSPITAL | Age: 39
End: 2019-05-10
Payer: COMMERCIAL

## 2019-05-10 ENCOUNTER — APPOINTMENT (EMERGENCY)
Dept: CT IMAGING | Facility: HOSPITAL | Age: 39
End: 2019-05-10
Payer: COMMERCIAL

## 2019-05-10 VITALS
BODY MASS INDEX: 48.82 KG/M2 | HEART RATE: 98 BPM | WEIGHT: 293 LBS | SYSTOLIC BLOOD PRESSURE: 138 MMHG | OXYGEN SATURATION: 100 % | TEMPERATURE: 98.5 F | DIASTOLIC BLOOD PRESSURE: 81 MMHG | RESPIRATION RATE: 20 BRPM

## 2019-05-10 DIAGNOSIS — I82.5Z2 CHRONIC DEEP VEIN THROMBOSIS (DVT) OF DISTAL VEIN OF LEFT LOWER EXTREMITY (HCC): Primary | ICD-10-CM

## 2019-05-10 DIAGNOSIS — R07.89 ATYPICAL CHEST PAIN: ICD-10-CM

## 2019-05-10 LAB
ANION GAP SERPL CALCULATED.3IONS-SCNC: 9 MMOL/L (ref 5–14)
APTT PPP: 28 SECONDS (ref 23–34)
B-HCG SERPL-ACNC: <3 MIU/ML
BASOPHILS # BLD AUTO: 0.1 THOUSANDS/ΜL (ref 0–0.1)
BASOPHILS NFR BLD AUTO: 1 % (ref 0–1)
BUN SERPL-MCNC: 13 MG/DL (ref 5–25)
CALCIUM SERPL-MCNC: 9.7 MG/DL (ref 8.4–10.2)
CHLORIDE SERPL-SCNC: 101 MMOL/L (ref 97–108)
CO2 SERPL-SCNC: 29 MMOL/L (ref 22–30)
CREAT SERPL-MCNC: 0.81 MG/DL (ref 0.6–1.2)
EOSINOPHIL # BLD AUTO: 0.2 THOUSAND/ΜL (ref 0–0.4)
EOSINOPHIL NFR BLD AUTO: 3 % (ref 0–6)
ERYTHROCYTE [DISTWIDTH] IN BLOOD BY AUTOMATED COUNT: 14.9 %
GFR SERPL CREATININE-BSD FRML MDRD: 91 ML/MIN/1.73SQ M
GLUCOSE SERPL-MCNC: 121 MG/DL (ref 70–99)
HCT VFR BLD AUTO: 40.9 % (ref 36–46)
HGB BLD-MCNC: 13.3 G/DL (ref 12–16)
INR PPP: 0.96 (ref 0.89–1.1)
LYMPHOCYTES # BLD AUTO: 3.3 THOUSANDS/ΜL (ref 0.5–4)
LYMPHOCYTES NFR BLD AUTO: 36 % (ref 25–45)
MCH RBC QN AUTO: 26.4 PG (ref 26–34)
MCHC RBC AUTO-ENTMCNC: 32.4 G/DL (ref 31–36)
MCV RBC AUTO: 82 FL (ref 80–100)
MONOCYTES # BLD AUTO: 0.6 THOUSAND/ΜL (ref 0.2–0.9)
MONOCYTES NFR BLD AUTO: 6 % (ref 1–10)
NEUTROPHILS # BLD AUTO: 4.9 THOUSANDS/ΜL (ref 1.8–7.8)
NEUTS SEG NFR BLD AUTO: 54 % (ref 45–65)
PLATELET # BLD AUTO: 304 THOUSANDS/UL (ref 150–450)
PMV BLD AUTO: 7.5 FL (ref 8.9–12.7)
POTASSIUM SERPL-SCNC: 3.9 MMOL/L (ref 3.6–5)
PROTHROMBIN TIME: 10.2 SECONDS (ref 9.5–11.6)
RBC # BLD AUTO: 5.02 MILLION/UL (ref 4–5.2)
SODIUM SERPL-SCNC: 139 MMOL/L (ref 137–147)
TROPONIN I SERPL-MCNC: <0.01 NG/ML (ref 0–0.03)
WBC # BLD AUTO: 9.1 THOUSAND/UL (ref 4.5–11)

## 2019-05-10 PROCEDURE — 71045 X-RAY EXAM CHEST 1 VIEW: CPT

## 2019-05-10 PROCEDURE — 93971 EXTREMITY STUDY: CPT

## 2019-05-10 PROCEDURE — 93005 ELECTROCARDIOGRAM TRACING: CPT

## 2019-05-10 PROCEDURE — 99285 EMERGENCY DEPT VISIT HI MDM: CPT

## 2019-05-10 PROCEDURE — 85025 COMPLETE CBC W/AUTO DIFF WBC: CPT | Performed by: EMERGENCY MEDICINE

## 2019-05-10 PROCEDURE — 99284 EMERGENCY DEPT VISIT MOD MDM: CPT | Performed by: EMERGENCY MEDICINE

## 2019-05-10 PROCEDURE — 84484 ASSAY OF TROPONIN QUANT: CPT | Performed by: EMERGENCY MEDICINE

## 2019-05-10 PROCEDURE — 80048 BASIC METABOLIC PNL TOTAL CA: CPT | Performed by: EMERGENCY MEDICINE

## 2019-05-10 PROCEDURE — 84702 CHORIONIC GONADOTROPIN TEST: CPT | Performed by: EMERGENCY MEDICINE

## 2019-05-10 PROCEDURE — 71275 CT ANGIOGRAPHY CHEST: CPT

## 2019-05-10 PROCEDURE — 85610 PROTHROMBIN TIME: CPT | Performed by: EMERGENCY MEDICINE

## 2019-05-10 PROCEDURE — 36415 COLL VENOUS BLD VENIPUNCTURE: CPT | Performed by: EMERGENCY MEDICINE

## 2019-05-10 PROCEDURE — 85730 THROMBOPLASTIN TIME PARTIAL: CPT | Performed by: EMERGENCY MEDICINE

## 2019-05-10 PROCEDURE — 93971 EXTREMITY STUDY: CPT | Performed by: SURGERY

## 2019-05-10 RX ADMIN — IOHEXOL 85 ML: 350 INJECTION, SOLUTION INTRAVENOUS at 21:26

## 2019-05-11 LAB
ATRIAL RATE: 73 BPM
P AXIS: 61 DEGREES
PR INTERVAL: 164 MS
QRS AXIS: 16 DEGREES
QRSD INTERVAL: 84 MS
QT INTERVAL: 406 MS
QTC INTERVAL: 447 MS
T WAVE AXIS: 69 DEGREES
VENTRICULAR RATE: 73 BPM

## 2019-05-11 PROCEDURE — 93010 ELECTROCARDIOGRAM REPORT: CPT | Performed by: INTERNAL MEDICINE

## 2020-03-13 ENCOUNTER — HOSPITAL ENCOUNTER (EMERGENCY)
Facility: HOSPITAL | Age: 40
Discharge: HOME | End: 2020-03-13
Attending: EMERGENCY MEDICINE | Admitting: EMERGENCY MEDICINE
Payer: COMMERCIAL

## 2020-03-13 ENCOUNTER — APPOINTMENT (EMERGENCY)
Dept: NON INVASIVE DIAGNOSTICS | Facility: HOSPITAL | Age: 40
End: 2020-03-13
Payer: COMMERCIAL

## 2020-03-13 VITALS
HEART RATE: 64 BPM | SYSTOLIC BLOOD PRESSURE: 100 MMHG | WEIGHT: 293 LBS | OXYGEN SATURATION: 96 % | DIASTOLIC BLOOD PRESSURE: 55 MMHG | RESPIRATION RATE: 16 BRPM | TEMPERATURE: 97.7 F | BODY MASS INDEX: 48.2 KG/M2

## 2020-03-13 DIAGNOSIS — G43.909 MIGRAINE HEADACHE: Primary | ICD-10-CM

## 2020-03-13 DIAGNOSIS — M79.661 RIGHT CALF PAIN: ICD-10-CM

## 2020-03-13 LAB
ANION GAP SERPL CALCULATED.3IONS-SCNC: 5 MMOL/L (ref 4–13)
BASOPHILS # BLD AUTO: 0.06 THOUSANDS/ΜL (ref 0–0.1)
BASOPHILS NFR BLD AUTO: 1 % (ref 0–1)
BUN SERPL-MCNC: 10 MG/DL (ref 5–25)
CALCIUM SERPL-MCNC: 9.1 MG/DL (ref 8.3–10.1)
CHLORIDE SERPL-SCNC: 102 MMOL/L (ref 100–108)
CO2 SERPL-SCNC: 30 MMOL/L (ref 21–32)
CREAT SERPL-MCNC: 0.85 MG/DL (ref 0.6–1.3)
EOSINOPHIL # BLD AUTO: 0.21 THOUSAND/ΜL (ref 0–0.61)
EOSINOPHIL NFR BLD AUTO: 3 % (ref 0–6)
ERYTHROCYTE [DISTWIDTH] IN BLOOD BY AUTOMATED COUNT: 14.6 % (ref 11.6–15.1)
GFR SERPL CREATININE-BSD FRML MDRD: 85 ML/MIN/1.73SQ M
GLUCOSE SERPL-MCNC: 108 MG/DL (ref 65–140)
HCT VFR BLD AUTO: 45.5 % (ref 34.8–46.1)
HGB BLD-MCNC: 13.7 G/DL (ref 11.5–15.4)
IMM GRANULOCYTES # BLD AUTO: 0.02 THOUSAND/UL (ref 0–0.2)
IMM GRANULOCYTES NFR BLD AUTO: 0 % (ref 0–2)
LYMPHOCYTES # BLD AUTO: 2.84 THOUSANDS/ΜL (ref 0.6–4.47)
LYMPHOCYTES NFR BLD AUTO: 34 % (ref 14–44)
MCH RBC QN AUTO: 26 PG (ref 26.8–34.3)
MCHC RBC AUTO-ENTMCNC: 30.1 G/DL (ref 31.4–37.4)
MCV RBC AUTO: 87 FL (ref 82–98)
MONOCYTES # BLD AUTO: 0.56 THOUSAND/ΜL (ref 0.17–1.22)
MONOCYTES NFR BLD AUTO: 7 % (ref 4–12)
NEUTROPHILS # BLD AUTO: 4.63 THOUSANDS/ΜL (ref 1.85–7.62)
NEUTS SEG NFR BLD AUTO: 55 % (ref 43–75)
NRBC BLD AUTO-RTO: 0 /100 WBCS
PLATELET # BLD AUTO: 309 THOUSANDS/UL (ref 149–390)
PMV BLD AUTO: 9 FL (ref 8.9–12.7)
POTASSIUM SERPL-SCNC: 4.1 MMOL/L (ref 3.5–5.3)
RBC # BLD AUTO: 5.26 MILLION/UL (ref 3.81–5.12)
SODIUM SERPL-SCNC: 137 MMOL/L (ref 136–145)
WBC # BLD AUTO: 8.32 THOUSAND/UL (ref 4.31–10.16)

## 2020-03-13 PROCEDURE — 99284 EMERGENCY DEPT VISIT MOD MDM: CPT | Performed by: EMERGENCY MEDICINE

## 2020-03-13 PROCEDURE — 85025 COMPLETE CBC W/AUTO DIFF WBC: CPT | Performed by: EMERGENCY MEDICINE

## 2020-03-13 PROCEDURE — 80048 BASIC METABOLIC PNL TOTAL CA: CPT | Performed by: EMERGENCY MEDICINE

## 2020-03-13 PROCEDURE — 93971 EXTREMITY STUDY: CPT | Performed by: SURGERY

## 2020-03-13 PROCEDURE — 93971 EXTREMITY STUDY: CPT

## 2020-03-13 PROCEDURE — 99284 EMERGENCY DEPT VISIT MOD MDM: CPT

## 2020-03-13 PROCEDURE — 96375 TX/PRO/DX INJ NEW DRUG ADDON: CPT

## 2020-03-13 PROCEDURE — 36415 COLL VENOUS BLD VENIPUNCTURE: CPT | Performed by: EMERGENCY MEDICINE

## 2020-03-13 PROCEDURE — 96374 THER/PROPH/DIAG INJ IV PUSH: CPT

## 2020-03-13 RX ORDER — DIPHENHYDRAMINE HYDROCHLORIDE 50 MG/ML
25 INJECTION INTRAMUSCULAR; INTRAVENOUS ONCE
Status: COMPLETED | OUTPATIENT
Start: 2020-03-13 | End: 2020-03-13

## 2020-03-13 RX ORDER — METOCLOPRAMIDE HYDROCHLORIDE 5 MG/ML
10 INJECTION INTRAMUSCULAR; INTRAVENOUS ONCE
Status: COMPLETED | OUTPATIENT
Start: 2020-03-13 | End: 2020-03-13

## 2020-03-13 RX ORDER — BUTALBITAL, ACETAMINOPHEN AND CAFFEINE 50; 325; 40 MG/1; MG/1; MG/1
1 TABLET ORAL EVERY 4 HOURS PRN
Qty: 12 TABLET | Refills: 0 | Status: SHIPPED | OUTPATIENT
Start: 2020-03-13 | End: 2020-03-18

## 2020-03-13 RX ORDER — KETOROLAC TROMETHAMINE 30 MG/ML
15 INJECTION, SOLUTION INTRAMUSCULAR; INTRAVENOUS ONCE
Status: COMPLETED | OUTPATIENT
Start: 2020-03-13 | End: 2020-03-13

## 2020-03-13 RX ADMIN — DIPHENHYDRAMINE HYDROCHLORIDE 25 MG: 50 INJECTION, SOLUTION INTRAMUSCULAR; INTRAVENOUS at 16:45

## 2020-03-13 RX ADMIN — METOCLOPRAMIDE 10 MG: 5 INJECTION, SOLUTION INTRAMUSCULAR; INTRAVENOUS at 16:50

## 2020-03-13 RX ADMIN — KETOROLAC TROMETHAMINE 15 MG: 30 INJECTION, SOLUTION INTRAMUSCULAR at 16:47

## 2020-03-13 NOTE — ED PROVIDER NOTES
History  Chief Complaint   Patient presents with    Leg Swelling     Patient presents complaining of RLE edema, feels similar to past DVT  Denies redness  Also complains of right sided headache, states "it's making my face twitch"   Headache     61-year-old female presents for right lower extremity pain  Feels like she has a DVT  Has had DVTs in the past   Is not on Eliquis anymore  Also has right-sided facial twitching  Only happens when she thinks about it  When I talked to her and examine her does not happen  Has some slight pain  No other modifying factors or associated symptoms no chest pain or shortness of breath  No fevers or chills  Exam is normal neurologic exam   No swelling no left or right lower extremity  Assessment plan:  Possible complex migraine, right lower leg pain  Vascular duplex rule out DVT  Prior to Admission Medications   Prescriptions Last Dose Informant Patient Reported? Taking? apixaban (ELIQUIS) 5 mg   Yes No   Sig: Take 5 mg by mouth 2 (two) times a day  apixaban (ELIQUIS) 5 mg   No No   Sig: Take 1 tablet (5 mg total) by mouth 2 (two) times a day      Facility-Administered Medications: None       Past Medical History:   Diagnosis Date    Asthma     Hx of blood clots     Psoriasis     Spinal stenosis        Past Surgical History:   Procedure Laterality Date    APPENDECTOMY      BACK SURGERY       SECTION      x 3    HYSTERECTOMY  2015    Radical       History reviewed  No pertinent family history  I have reviewed and agree with the history as documented  E-Cigarette/Vaping    E-Cigarette Use Never User      E-Cigarette/Vaping Substances     Social History     Tobacco Use    Smoking status: Current Some Day Smoker     Packs/day: 0 25     Types: Cigarettes    Smokeless tobacco: Never Used   Substance Use Topics    Alcohol use: No    Drug use: No       Review of Systems   Constitutional: Negative for chills, fatigue and fever     Eyes: Negative for photophobia and visual disturbance  Respiratory: Negative for cough and shortness of breath  Cardiovascular: Negative for chest pain, palpitations and leg swelling  Gastrointestinal: Negative for diarrhea, nausea and vomiting  Endocrine: Negative for polydipsia and polyuria  Genitourinary: Negative for decreased urine volume, difficulty urinating, dysuria and frequency  Musculoskeletal: Negative for back pain, neck pain and neck stiffness  Skin: Negative for color change and rash  Allergic/Immunologic: Negative for environmental allergies and immunocompromised state  Neurological: Negative for dizziness and headaches  Hematological: Negative for adenopathy  Does not bruise/bleed easily  Psychiatric/Behavioral: Negative for dysphoric mood  The patient is not nervous/anxious  Physical Exam  Physical Exam   Constitutional: She is oriented to person, place, and time  She appears well-developed and well-nourished  No distress  HENT:   Head: Normocephalic and atraumatic  Nose: Nose normal    Eyes: Pupils are equal, round, and reactive to light  Conjunctivae and EOM are normal  No scleral icterus  Neck: Normal range of motion  Neck supple  No JVD present  No tracheal deviation present  No thyromegaly present  Cardiovascular: Normal rate, regular rhythm, normal heart sounds and intact distal pulses  Exam reveals no gallop and no friction rub  Pulmonary/Chest: Effort normal and breath sounds normal  No respiratory distress  She has no wheezes  She has no rales  She exhibits no tenderness  Abdominal: Soft  Bowel sounds are normal  She exhibits no distension and no mass  There is no tenderness  There is no rebound and no guarding  No hernia  Musculoskeletal: Normal range of motion  She exhibits no edema, tenderness or deformity  Neurological: She is alert and oriented to person, place, and time  She has normal reflexes  No cranial nerve deficit   Coordination normal  Skin: Skin is warm and dry  She is not diaphoretic  No erythema  Psychiatric: She has a normal mood and affect  Her behavior is normal    Nursing note and vitals reviewed        Vital Signs  ED Triage Vitals [03/13/20 1539]   Temperature Pulse Respirations Blood Pressure SpO2   97 7 °F (36 5 °C) 94 18 134/83 98 %      Temp Source Heart Rate Source Patient Position - Orthostatic VS BP Location FiO2 (%)   Temporal Monitor Sitting Right arm --      Pain Score       6           Vitals:    03/13/20 1539 03/13/20 1849   BP: 134/83 100/55   Pulse: 94 64   Patient Position - Orthostatic VS: Sitting Lying         Visual Acuity      ED Medications  Medications   ketorolac (TORADOL) injection 15 mg (15 mg Intravenous Given 3/13/20 1647)   metoclopramide (REGLAN) injection 10 mg (10 mg Intravenous Given 3/13/20 1650)   diphenhydrAMINE (BENADRYL) injection 25 mg (25 mg Intravenous Given 3/13/20 1645)       Diagnostic Studies  Results Reviewed     Procedure Component Value Units Date/Time    Basic metabolic panel [[id number]] Collected:  03/13/20 1651    Lab Status:  Final result Specimen:  Blood from Arm, Right Updated:  03/13/20 1716     Sodium 137 mmol/L      Potassium 4 1 mmol/L      Chloride 102 mmol/L      CO2 30 mmol/L      ANION GAP 5 mmol/L      BUN 10 mg/dL      Creatinine 0 85 mg/dL      Glucose 108 mg/dL      Calcium 9 1 mg/dL      eGFR 85 ml/min/1 73sq m     Narrative:       [place] guidelines for Chronic Kidney Disease (CKD):     Stage 1 with normal or high GFR (GFR > 90 mL/min/1 73 square meters)    Stage 2 Mild CKD (GFR = 60-89 mL/min/1 73 square meters)    Stage 3A Moderate CKD (GFR = 45-59 mL/min/1 73 square meters)    Stage 3B Moderate CKD (GFR = 30-44 mL/min/1 73 square meters)    Stage 4 Severe CKD (GFR = 15-29 mL/min/1 73 square meters)    Stage 5 End Stage CKD (GFR <15 mL/min/1 73 square meters)  Note: GFR calculation is accurate only with a steady state creatinine    CBC and differential [[id number]]  (Abnormal) Collected:  03/13/20 1651    Lab Status:  Final result Specimen:  Blood from Arm, Right Updated:  03/13/20 1705     WBC 8 32 Thousand/uL      RBC 5 26 Million/uL      Hemoglobin 13 7 g/dL      Hematocrit 45 5 %      MCV 87 fL      MCH 26 0 pg      MCHC 30 1 g/dL      RDW 14 6 %      MPV 9 0 fL      Platelets 531 Thousands/uL      nRBC 0 /100 WBCs      Neutrophils Relative 55 %      Immat GRANS % 0 %      Lymphocytes Relative 34 %      Monocytes Relative 7 %      Eosinophils Relative 3 %      Basophils Relative 1 %      Neutrophils Absolute 4 63 Thousands/µL      Immature Grans Absolute 0 02 Thousand/uL      Lymphocytes Absolute 2 84 Thousands/µL      Monocytes Absolute 0 56 Thousand/µL      Eosinophils Absolute 0 21 Thousand/µL      Basophils Absolute 0 06 Thousands/µL                  VAS lower limb venous duplex study, unilateral/limited   Final Result by Vanessa Irby MD (03/13 2014)                 Procedures  Procedures         ED Course                                 MDM  Number of Diagnoses or Management Options  Migraine headache: new and requires workup  Right calf pain: new and requires workup     Amount and/or Complexity of Data Reviewed  Tests in the radiology section of CPT®: reviewed and ordered  Independent visualization of images, tracings, or specimens: yes          Disposition  Final diagnoses:   Migraine headache   Right calf pain     Time reflects when diagnosis was documented in both MDM as applicable and the Disposition within this note     Time User Action Codes Description Comment    3/13/2020  6:37 PM Elvis Part Add [G43 909] Migraine headache     3/13/2020  6:37 PM Elvis Part Add [Y57 006] Right calf pain       ED Disposition     ED Disposition Condition Date/Time Comment    Discharge Stable Fri Mar 13, 2020  6:37 PM Yenifer Manriquez discharge to home/self care              Follow-up Information     Follow up With Specialties Details Why Contact Ayde Bryan MD Family Medicine Schedule an appointment as soon as possible for a visit  As needed [address]  [address] [facility]  [place] [address]  [phone]            Discharge Medication List as of 3/13/2020  6:37 PM      CONTINUE these medications which have NOT CHANGED    Details   !! apixaban (ELIQUIS) 5 mg Take 5 mg by mouth 2 (two) times a day , Until Discontinued, Historical Med      !! apixaban (ELIQUIS) 5 mg Take 1 tablet (5 mg total) by mouth 2 (two) times a day, Starting Fri 5/10/2019, Print       !! - Potential duplicate medications found  Please discuss with provider  No discharge procedures on file      PDMP Review     None          ED Provider  Electronically Signed by           Keri Villarreal DO  03/22/20 5713

## 2021-12-10 ENCOUNTER — HOSPITAL ENCOUNTER (EMERGENCY)
Facility: HOSPITAL | Age: 41
Discharge: HOME | End: 2021-12-10
Attending: EMERGENCY MEDICINE | Admitting: EMERGENCY MEDICINE
Payer: COMMERCIAL

## 2021-12-10 VITALS
SYSTOLIC BLOOD PRESSURE: 124 MMHG | WEIGHT: 293 LBS | OXYGEN SATURATION: 99 % | HEART RATE: 69 BPM | DIASTOLIC BLOOD PRESSURE: 71 MMHG | RESPIRATION RATE: 20 BRPM | TEMPERATURE: 98.4 F | BODY MASS INDEX: 48.66 KG/M2

## 2021-12-10 DIAGNOSIS — N89.8 VAGINAL DISCHARGE: Primary | ICD-10-CM

## 2021-12-10 PROCEDURE — 87491 CHLMYD TRACH DNA AMP PROBE: CPT | Performed by: PHYSICIAN ASSISTANT

## 2021-12-10 PROCEDURE — 87510 GARDNER VAG DNA DIR PROBE: CPT | Performed by: PHYSICIAN ASSISTANT

## 2021-12-10 PROCEDURE — 87480 CANDIDA DNA DIR PROBE: CPT | Performed by: PHYSICIAN ASSISTANT

## 2021-12-10 PROCEDURE — 99284 EMERGENCY DEPT VISIT MOD MDM: CPT | Performed by: PHYSICIAN ASSISTANT

## 2021-12-10 PROCEDURE — 87660 TRICHOMONAS VAGIN DIR PROBE: CPT | Performed by: PHYSICIAN ASSISTANT

## 2021-12-10 PROCEDURE — 99283 EMERGENCY DEPT VISIT LOW MDM: CPT

## 2021-12-10 PROCEDURE — 87591 N.GONORRHOEAE DNA AMP PROB: CPT | Performed by: PHYSICIAN ASSISTANT

## 2021-12-10 RX ORDER — FLUCONAZOLE 100 MG/1
200 TABLET ORAL ONCE
Status: COMPLETED | OUTPATIENT
Start: 2021-12-10 | End: 2021-12-10

## 2021-12-10 RX ORDER — ENOXAPARIN SODIUM 300 MG/3ML
150 INJECTION INTRAVENOUS; SUBCUTANEOUS 2 TIMES DAILY
COMMUNITY
Start: 2021-07-30 | End: 2022-04-26

## 2021-12-10 RX ADMIN — FLUCONAZOLE 200 MG: 100 TABLET ORAL at 10:05

## 2021-12-11 LAB
C TRACH DNA SPEC QL NAA+PROBE: NEGATIVE
CANDIDA RRNA VAG QL PROBE: POSITIVE
G VAGINALIS RRNA GENITAL QL PROBE: POSITIVE
N GONORRHOEA DNA SPEC QL NAA+PROBE: NEGATIVE
T VAGINALIS RRNA GENITAL QL PROBE: NEGATIVE

## 2022-03-17 ENCOUNTER — APPOINTMENT (EMERGENCY)
Dept: CT IMAGING | Facility: HOSPITAL | Age: 42
End: 2022-03-17
Payer: COMMERCIAL

## 2022-03-17 ENCOUNTER — HOSPITAL ENCOUNTER (EMERGENCY)
Facility: HOSPITAL | Age: 42
Discharge: HOME | End: 2022-03-17
Attending: EMERGENCY MEDICINE
Payer: COMMERCIAL

## 2022-03-17 VITALS
BODY MASS INDEX: 47.17 KG/M2 | DIASTOLIC BLOOD PRESSURE: 64 MMHG | OXYGEN SATURATION: 99 % | TEMPERATURE: 98.8 F | HEART RATE: 84 BPM | RESPIRATION RATE: 16 BRPM | WEIGHT: 293 LBS | SYSTOLIC BLOOD PRESSURE: 122 MMHG

## 2022-03-17 DIAGNOSIS — N12 PYELONEPHRITIS: Primary | ICD-10-CM

## 2022-03-17 LAB
ALBUMIN SERPL BCP-MCNC: 4.3 G/DL (ref 3–5.2)
ALP SERPL-CCNC: 98 U/L (ref 43–122)
ALT SERPL W P-5'-P-CCNC: 28 U/L
ANION GAP SERPL CALCULATED.3IONS-SCNC: 7 MMOL/L (ref 5–14)
APTT PPP: 30 SECONDS (ref 23–37)
AST SERPL W P-5'-P-CCNC: 26 U/L (ref 14–36)
BACTERIA UR QL AUTO: ABNORMAL /HPF
BASOPHILS # BLD AUTO: 0.1 THOUSANDS/ΜL (ref 0–0.1)
BASOPHILS NFR BLD AUTO: 1 % (ref 0–1)
BILIRUB SERPL-MCNC: 0.73 MG/DL
BILIRUB UR QL STRIP: NEGATIVE
BUN SERPL-MCNC: 12 MG/DL (ref 5–25)
CALCIUM SERPL-MCNC: 9.1 MG/DL (ref 8.4–10.2)
CHLORIDE SERPL-SCNC: 104 MMOL/L (ref 97–108)
CLARITY UR: CLEAR
CO2 SERPL-SCNC: 28 MMOL/L (ref 22–30)
COLOR UR: ABNORMAL
CREAT SERPL-MCNC: 0.77 MG/DL (ref 0.6–1.2)
EOSINOPHIL # BLD AUTO: 0.2 THOUSAND/ΜL (ref 0–0.4)
EOSINOPHIL NFR BLD AUTO: 2 % (ref 0–6)
ERYTHROCYTE [DISTWIDTH] IN BLOOD BY AUTOMATED COUNT: 18.9 %
EXT PREG TEST URINE: NEGATIVE
EXT. CONTROL ED NAV: NORMAL
GFR SERPL CREATININE-BSD FRML MDRD: 94 ML/MIN/1.73SQ M
GLUCOSE SERPL-MCNC: 97 MG/DL (ref 70–99)
GLUCOSE UR STRIP-MCNC: NEGATIVE MG/DL
HCT VFR BLD AUTO: 40.6 % (ref 36–46)
HGB BLD-MCNC: 12.8 G/DL (ref 12–16)
HGB UR QL STRIP.AUTO: NEGATIVE
INR PPP: 1.04 (ref 0.84–1.19)
KETONES UR STRIP-MCNC: NEGATIVE MG/DL
LEUKOCYTE ESTERASE UR QL STRIP: 100
LYMPHOCYTES # BLD AUTO: 3.6 THOUSANDS/ΜL (ref 0.5–4)
LYMPHOCYTES NFR BLD AUTO: 30 % (ref 25–45)
MCH RBC QN AUTO: 23.2 PG (ref 26–34)
MCHC RBC AUTO-ENTMCNC: 31.6 G/DL (ref 31–36)
MCV RBC AUTO: 73 FL (ref 80–100)
MICROCYTES BLD QL AUTO: PRESENT
MONOCYTES # BLD AUTO: 0.8 THOUSAND/ΜL (ref 0.2–0.9)
MONOCYTES NFR BLD AUTO: 7 % (ref 1–10)
MUCOUS THREADS UR QL AUTO: ABNORMAL
NEUTROPHILS # BLD AUTO: 7.2 THOUSANDS/ΜL (ref 1.8–7.8)
NEUTS SEG NFR BLD AUTO: 61 % (ref 45–65)
NITRITE UR QL STRIP: POSITIVE
NON-SQ EPI CELLS URNS QL MICRO: ABNORMAL /HPF
PH UR STRIP.AUTO: 5 [PH]
PLATELET # BLD AUTO: 377 THOUSANDS/UL (ref 150–450)
PLATELET BLD QL SMEAR: ADEQUATE
PMV BLD AUTO: 7.1 FL (ref 8.9–12.7)
POTASSIUM SERPL-SCNC: 4.1 MMOL/L (ref 3.6–5)
PROT SERPL-MCNC: 8.3 G/DL (ref 5.9–8.4)
PROT UR STRIP-MCNC: NEGATIVE MG/DL
PROTHROMBIN TIME: 13.2 SECONDS (ref 11.6–14.5)
RBC # BLD AUTO: 5.53 MILLION/UL (ref 4–5.2)
RBC #/AREA URNS AUTO: ABNORMAL /HPF
RBC MORPH BLD: NORMAL
SODIUM SERPL-SCNC: 139 MMOL/L (ref 137–147)
SP GR UR STRIP.AUTO: 1.02 (ref 1–1.04)
UROBILINOGEN UA: NEGATIVE MG/DL
WBC # BLD AUTO: 11.8 THOUSAND/UL (ref 4.5–11)
WBC #/AREA URNS AUTO: ABNORMAL /HPF

## 2022-03-17 PROCEDURE — 85610 PROTHROMBIN TIME: CPT | Performed by: EMERGENCY MEDICINE

## 2022-03-17 PROCEDURE — 99284 EMERGENCY DEPT VISIT MOD MDM: CPT

## 2022-03-17 PROCEDURE — 96374 THER/PROPH/DIAG INJ IV PUSH: CPT

## 2022-03-17 PROCEDURE — 81001 URINALYSIS AUTO W/SCOPE: CPT | Performed by: EMERGENCY MEDICINE

## 2022-03-17 PROCEDURE — 81025 URINE PREGNANCY TEST: CPT | Performed by: EMERGENCY MEDICINE

## 2022-03-17 PROCEDURE — 80053 COMPREHEN METABOLIC PANEL: CPT | Performed by: EMERGENCY MEDICINE

## 2022-03-17 PROCEDURE — 99284 EMERGENCY DEPT VISIT MOD MDM: CPT | Performed by: EMERGENCY MEDICINE

## 2022-03-17 PROCEDURE — 36415 COLL VENOUS BLD VENIPUNCTURE: CPT | Performed by: EMERGENCY MEDICINE

## 2022-03-17 PROCEDURE — 85730 THROMBOPLASTIN TIME PARTIAL: CPT | Performed by: EMERGENCY MEDICINE

## 2022-03-17 PROCEDURE — 85025 COMPLETE CBC W/AUTO DIFF WBC: CPT | Performed by: EMERGENCY MEDICINE

## 2022-03-17 PROCEDURE — 96375 TX/PRO/DX INJ NEW DRUG ADDON: CPT

## 2022-03-17 PROCEDURE — 96361 HYDRATE IV INFUSION ADD-ON: CPT

## 2022-03-17 PROCEDURE — 74176 CT ABD & PELVIS W/O CONTRAST: CPT

## 2022-03-17 PROCEDURE — 81003 URINALYSIS AUTO W/O SCOPE: CPT | Performed by: EMERGENCY MEDICINE

## 2022-03-17 RX ORDER — ONDANSETRON 2 MG/ML
4 INJECTION INTRAMUSCULAR; INTRAVENOUS ONCE
Status: COMPLETED | OUTPATIENT
Start: 2022-03-17 | End: 2022-03-17

## 2022-03-17 RX ORDER — CIPROFLOXACIN 500 MG/1
500 TABLET, FILM COATED ORAL 2 TIMES DAILY
Qty: 14 TABLET | Refills: 0 | Status: SHIPPED | OUTPATIENT
Start: 2022-03-17 | End: 2022-03-17 | Stop reason: ALTCHOICE

## 2022-03-17 RX ORDER — KETOROLAC TROMETHAMINE 30 MG/ML
30 INJECTION, SOLUTION INTRAMUSCULAR; INTRAVENOUS ONCE
Status: COMPLETED | OUTPATIENT
Start: 2022-03-17 | End: 2022-03-17

## 2022-03-17 RX ORDER — CIPROFLOXACIN 250 MG/1
500 TABLET, FILM COATED ORAL ONCE
Status: COMPLETED | OUTPATIENT
Start: 2022-03-17 | End: 2022-03-17

## 2022-03-17 RX ORDER — LEVOFLOXACIN 750 MG/1
750 TABLET ORAL EVERY 24 HOURS
Qty: 5 TABLET | Refills: 0 | Status: SHIPPED | OUTPATIENT
Start: 2022-03-17 | End: 2022-03-22

## 2022-03-17 RX ORDER — NAPROXEN 500 MG/1
500 TABLET ORAL 2 TIMES DAILY WITH MEALS
Qty: 30 TABLET | Refills: 0 | Status: SHIPPED | OUTPATIENT
Start: 2022-03-17

## 2022-03-17 RX ADMIN — KETOROLAC TROMETHAMINE 30 MG: 30 INJECTION, SOLUTION INTRAMUSCULAR; INTRAVENOUS at 14:56

## 2022-03-17 RX ADMIN — ONDANSETRON 4 MG: 2 INJECTION INTRAMUSCULAR; INTRAVENOUS at 14:56

## 2022-03-17 RX ADMIN — SODIUM CHLORIDE 1000 ML: 0.9 INJECTION, SOLUTION INTRAVENOUS at 14:59

## 2022-03-17 RX ADMIN — CIPROFLOXACIN HYDROCHLORIDE 500 MG: 250 TABLET, FILM COATED ORAL at 17:57

## 2022-03-17 NOTE — ED PROVIDER NOTES
History  Chief Complaint   Patient presents with    Flank Pain     states right side flank pain, increased foul odor of urine over last few days and now increased pain and decreased urination   36 yo morbidly obese female with a complicated past medical history including asthma, prior DVT (now on Eliquis), psoriasis spinal stenosis, nephrolithiasis, and polysubstance abuse presents to the ED complaining of foul smelling urine x 2 weeks and right flank pain x 2 days  The patient says her "pee has been stinking" for the past 2 weeks and she then developed pain in the right flank earlier this week  (+) Nausea but no vomiting  No gross hematuria  (+) Urinary hesitancy  No vaginal bleeding or discharge  No abdominal or pelvic pain  She denies fevers and chills  No other specific complaints  Prior to Admission Medications   Prescriptions Last Dose Informant Patient Reported? Taking? HYDROcodone-Acetaminophen (VICODIN PO) Not Taking at Unknown time  Yes No   Sig: every 4 (four) hours   Patient not taking: Reported on 3/17/2022    Methocarbamol (ROBAXIN-750 PO)   Yes No   Sig: Robaxin-750   q 6hr   Multiple Vitamins-Minerals (ONE DAILY WOMENS PO)   Yes No   Sig: Take 1 tablet by mouth daily   apixaban (ELIQUIS) 5 mg Not Taking at Unknown time  Yes No   Sig: Take 5 mg by mouth 2 (two) times a day     Patient not taking: Reported on 12/10/2021    apixaban (ELIQUIS) 5 mg Not Taking at Unknown time  No No   Sig: Take 1 tablet (5 mg total) by mouth 2 (two) times a day   Patient not taking: Reported on 12/10/2021    atorvastatin (LIPITOR) 20 mg tablet Not Taking at Unknown time  Yes No   Sig: Take 1 tablet by mouth   Patient not taking: Reported on 3/17/2022    buPROPion (WELLBUTRIN SR) 150 mg 12 hr tablet Not Taking at Unknown time  Yes No   Sig: Take by mouth   Patient not taking: Reported on 3/17/2022    enoxaparin (LOVENOX) 300 mg/3 mL Not Taking at Unknown time  Yes No   Sig: Inject 150 mg under the skin 2 (two) times a day   Patient not taking: Reported on 3/17/2022    fluocinonide (LIDEX) 0 05 % ointment Not Taking at Unknown time  Yes No   Sig: Apply topically   Patient not taking: Reported on 3/17/2022    furosemide (LASIX) 20 mg tablet Not Taking at Unknown time  Yes No   Sig: Take 1 tablet by mouth daily   Patient not taking: Reported on 3/17/2022    methadone (METHADOSE) 40 MG disintegrating tablet Not Taking at Unknown time  Yes No   Sig: Take 80 mg by mouth daily   Patient not taking: Reported on 3/17/2022    methocarbamol (ROBAXIN) 750 mg tablet   Yes No   Sig: methocarbamol 750 mg tablet   TAKE 1 TABLET (750 MG TOTAL) BY MOUTH 4 (FOUR) TIMES A DAY AS NEEDED FOR MUSCLE SPASMS  omeprazole (PriLOSEC) 40 MG capsule   Yes No   Sig: Take 1 capsule by mouth daily   phentermine 15 MG capsule   Yes No   Sig: every 24 hours      Facility-Administered Medications: None       Past Medical History:   Diagnosis Date    Asthma     Hx of blood clots     Psoriasis     Spinal stenosis        Past Surgical History:   Procedure Laterality Date    APPENDECTOMY      BACK SURGERY       SECTION      x 3    HYSTERECTOMY  2015    Radical       History reviewed  No pertinent family history  I have reviewed and agree with the history as documented  E-Cigarette/Vaping    E-Cigarette Use Never User      E-Cigarette/Vaping Substances     Social History     Tobacco Use    Smoking status: Current Some Day Smoker     Packs/day: 0 25     Types: Cigarettes    Smokeless tobacco: Never Used   Vaping Use    Vaping Use: Never used   Substance Use Topics    Alcohol use: No    Drug use: No       Review of Systems   Constitutional: Negative for chills and fever  HENT: Negative for sore throat  Eyes: Negative for visual disturbance  Respiratory: Negative for shortness of breath  Cardiovascular: Negative for chest pain  Gastrointestinal: Positive for nausea  Negative for abdominal pain, diarrhea and vomiting  Endocrine: Negative for cold intolerance and heat intolerance  Genitourinary: Positive for difficulty urinating and flank pain  Negative for dysuria, frequency, hematuria, vaginal bleeding and vaginal discharge  Musculoskeletal: Negative for back pain  Skin: Negative for rash  Allergic/Immunologic: Negative for immunocompromised state  Neurological: Negative for weakness and numbness  Hematological: Negative for adenopathy  Psychiatric/Behavioral: Negative for self-injury  Physical Exam  Physical Exam  Constitutional:       General: She is not in acute distress  Appearance: She is well-developed  HENT:      Head: Normocephalic and atraumatic  Eyes:      Pupils: Pupils are equal, round, and reactive to light  Cardiovascular:      Rate and Rhythm: Normal rate and regular rhythm  Pulmonary:      Effort: Pulmonary effort is normal       Breath sounds: Normal breath sounds  Abdominal:      General: There is no distension  Palpations: Abdomen is soft  Tenderness: There is no abdominal tenderness  There is right CVA tenderness  There is no guarding or rebound  Negative signs include Zuluaga's sign and McBurney's sign  Musculoskeletal:         General: Normal range of motion  Cervical back: Normal range of motion and neck supple  Skin:     General: Skin is warm and dry  Neurological:      Mental Status: She is alert and oriented to person, place, and time           Vital Signs  ED Triage Vitals   Temperature Pulse Respirations Blood Pressure SpO2   03/17/22 1531 03/17/22 1531 03/17/22 1531 03/17/22 1531 03/17/22 1531   98 8 °F (37 1 °C) 74 18 120/71 99 %      Temp Source Heart Rate Source Patient Position - Orthostatic VS BP Location FiO2 (%)   03/17/22 1531 03/17/22 1531 03/17/22 1531 03/17/22 1531 --   Oral Monitor Sitting Left arm       Pain Score       03/17/22 1456       10 - Worst Possible Pain           Vitals:    03/17/22 1531 03/17/22 1739   BP: 120/71 122/64 Pulse: 74 84   Patient Position - Orthostatic VS: Sitting Lying         Visual Acuity      ED Medications  Medications   sodium chloride 0 9 % bolus 1,000 mL (0 mL Intravenous Stopped 3/17/22 1621)   ketorolac (TORADOL) injection 30 mg (30 mg Intravenous Given 3/17/22 1456)   ondansetron (ZOFRAN) injection 4 mg (4 mg Intravenous Given 3/17/22 1456)   ciprofloxacin (CIPRO) tablet 500 mg (500 mg Oral Given 3/17/22 1757)       Diagnostic Studies  Results Reviewed     Procedure Component Value Units Date/Time    Smear Review(Phlebs Do Not Order) [[id number]] Collected: 03/17/22 1453    Lab Status: Final result Specimen: Blood from Arm, Right Updated: 03/17/22 1544     RBC Morphology abnormal     Microcytes Present     Platelet Estimate Adequate    Urine Microscopic [[id number]]  (Abnormal) Collected: 03/17/22 1458    Lab Status: Final result Specimen: Urine, Clean Catch Updated: 03/17/22 1538     RBC, UA None Seen /hpf      WBC, UA 4-10 /hpf      Epithelial Cells Innumerable /hpf      Bacteria, UA Moderate /hpf      MUCUS THREADS Moderate    UA w Reflex to Microscopic w Reflex to Culture [[id number]]  (Abnormal) Collected: 03/17/22 1458    Lab Status: Final result Specimen: Urine, Clean Catch Updated: 03/17/22 1520     Color, UA Lakshmi     Clarity, UA Clear     Specific Gravity, UA 1 025     pH, UA 5 0     Leukocytes,  0     Nitrite, UA Positive     Protein, UA Negative mg/dl      Glucose, UA Negative mg/dl      Ketones, UA Negative mg/dl      Bilirubin, UA Negative     Blood, UA Negative     UROBILINOGEN UA Negative mg/dL     Comprehensive metabolic panel [[id number]] Collected: 03/17/22 1453    Lab Status: Final result Specimen: Blood from Arm, Right Updated: 03/17/22 1513     Sodium 139 mmol/L      Potassium 4 1 mmol/L      Chloride 104 mmol/L      CO2 28 mmol/L      ANION GAP 7 mmol/L      BUN 12 mg/dL      Creatinine 0 77 mg/dL      Glucose 97 mg/dL      Calcium 9 1 mg/dL      AST 26 U/L      ALT 28 U/L Alkaline Phosphatase 98 U/L      Total Protein 8 3 g/dL      Albumin 4 3 g/dL      Total Bilirubin 0 73 mg/dL      eGFR 94 ml/min/1 73sq m     Narrative:      [employer] Kid[employer] guidelines for Chronic Kidney Disease (CKD):     Stage 1 with normal or high GFR (GFR > 90 mL/min/1 73 square meters)    Stage 2 Mild CKD (GFR = 60-89 mL/min/1 73 square meters)    Stage 3A Moderate CKD (GFR = 45-59 mL/min/1 73 square meters)    Stage 3B Moderate CKD (GFR = 30-44 mL/min/1 73 square meters)    Stage 4 Severe CKD (GFR = 15-29 mL/min/1 73 square meters)    Stage 5 End Stage CKD (GFR <15 mL/min/1 73 square meters)  Note: GFR calculation is accurate only with a steady state creatinine    Protime-INR [[id number]]  (Normal) Collected: 03/17/22 1453    Lab Status: Final result Specimen: Blood from Arm, Right Updated: 03/17/22 1509     Protime 13 2 seconds      INR 1 04    APTT [[id number]]  (Normal) Collected: 03/17/22 1453    Lab Status: Final result Specimen: Blood from Arm, Right Updated: 03/17/22 1509     PTT 30 seconds     CBC and differential [[id number]]  (Abnormal) Collected: 03/17/22 1453    Lab Status: Final result Specimen: Blood from Arm, Right Updated: 03/17/22 1502     WBC 11 80 Thousand/uL      RBC 5 53 Million/uL      Hemoglobin 12 8 g/dL      Hematocrit 40 6 %      MCV 73 fL      MCH 23 2 pg      MCHC 31 6 g/dL      RDW 18 9 %      MPV 7 1 fL      Platelets 806 Thousands/uL      Neutrophils Relative 61 %      Lymphocytes Relative 30 %      Monocytes Relative 7 %      Eosinophils Relative 2 %      Basophils Relative 1 %      Neutrophils Absolute 7 20 Thousands/µL      Lymphocytes Absolute 3 60 Thousands/µL      Monocytes Absolute 0 80 Thousand/µL      Eosinophils Absolute 0 20 Thousand/µL      Basophils Absolute 0 10 Thousands/µL     POCT pregnancy, urine [[id number]]  (Normal) Resulted: 03/17/22 1500    Lab Status: Final result Updated: 03/17/22 1500     EXT PREG TEST UR (Ref: Negative) negative Control valid                 CT renal stone study abdomen pelvis without contrast   Final Result by Calvin Major MD (03/17 7205)      No urinary tract calculi  No acute abnormality  Workstation performed: [id number]                    Procedures  Procedures         ED Course                               SBIRT 20yo+      Most Recent Value   SBIRT (24 yo +)    In order to provide better care to our patients, we are screening all of our patients for alcohol and drug use  Would it be okay to ask you these screening questions? No Filed at: 03/17/2022 1531                    MDM  Number of Diagnoses or Management Options  Pyelonephritis  Diagnosis management comments: The patient is uncomfortable appearing with right CVA tenderness on exam  Unclear etiology of her complaints  Pyelonephritis vs nephrolithiasis vs musculoskeletal back pain? Will check basic labs, coags, UA, and CT A/P stone study  IVFs, Zofran, and Toradol administered  Will continue to monitor in the ED  17:50 UA (+) for leukocytes, nitrites, and bacteria  Workup otherwise unremarkable  Pain resolved  Symptoms are consistent with a pyelonephritis  Plan for pain control and a course of Cipro  Urine culture sent to the lab  The patient was instructed to follow up with her PCP and Urology early next week  She is agreeable to this plan  Strict return precautions provided          Amount and/or Complexity of Data Reviewed  Clinical lab tests: ordered and reviewed  Tests in the radiology section of CPT®: ordered and reviewed  Tests in the medicine section of CPT®: ordered and reviewed    Patient Progress  Patient progress: improved      Disposition  Final diagnoses:   Pyelonephritis     Time reflects when diagnosis was documented in both MDM as applicable and the Disposition within this note     Time User Action Codes Description Comment    3/17/2022  5:51 PM Kyle Silverio Add [N12] Pyelonephritis       ED Disposition     ED Disposition Condition Date/Time Comment    Discharge Stable Th Mar 17, 2022  5:51 PM Katarina Rivera discharge to home/self care              Follow-up Information     Follow up With Specialties Details Why Contact Info Additional Information    Daina Dickerson MD Family Medicine Schedule an appointment as soon as possible for a visit   [address]  [address] [facility]  [place]  [address]  [phone]  [phone]       [facility]rology Schedule an appointment as soon as possible for a visit   [facility] [address]  [address] [facility] [address]  [address]  [facility], [address], [facility], [place], [address] [phone]          Discharge Medication List as of 3/17/2022  5:54 PM      START taking these medications    Details   naproxen (Naprosyn) 500 mg tablet Take 1 tablet (500 mg total) by mouth 2 (two) times a day with meals, Starting Thu 3/17/2022, Normal      ciprofloxacin (CIPRO) 500 mg tablet Take 1 tablet (500 mg total) by mouth 2 (two) times a day for 7 days, Starting Thu 3/17/2022, Until Thu 3/24/2022, Normal         CONTINUE these medications which have NOT CHANGED    Details   !! apixaban (ELIQUIS) 5 mg Take 5 mg by mouth 2 (two) times a day , Until Discontinued, Historical Med      !! apixaban (ELIQUIS) 5 mg Take 1 tablet (5 mg total) by mouth 2 (two) times a day, Starting Fri 5/10/2019, Print      atorvastatin (LIPITOR) 20 mg tablet Take 1 tablet by mouth, Starting Thu 4/17/2014, Historical Med      buPROPion (WELLBUTRIN SR) 150 mg 12 hr tablet Take by mouth, Starting Thu 6/22/2017, Historical Med      enoxaparin (LOVENOX) 300 mg/3 mL Inject 150 mg under the skin 2 (two) times a day, Starting Fri 7/30/2021, Until Tue 4/26/2022, Historical Med      fluocinonide (LIDEX) 0 05 % ointment Apply topically, Starting Fri 6/23/2017, Historical Med      furosemide (LASIX) 20 mg tablet Take 1 tablet by mouth daily, Starting Mon 7/31/2017, Historical Med      HYDROcodone-Acetaminophen (VICODIN PO) every 4 (four) hours, Historical Med      methadone (METHADOSE) 40 MG disintegrating tablet Take 80 mg by mouth daily, Historical Med      !! methocarbamol (ROBAXIN) 750 mg tablet methocarbamol 750 mg tablet   TAKE 1 TABLET (750 MG TOTAL) BY MOUTH 4 (FOUR) TIMES A DAY AS NEEDED FOR MUSCLE SPASMS , Historical Med      !! Methocarbamol (ROBAXIN-750 PO) Robaxin-750   q 6hr, Historical Med      Multiple Vitamins-Minerals (ONE DAILY WOMENS PO) Take 1 tablet by mouth daily, Starting Fri 10/3/2014, Historical Med      omeprazole (PriLOSEC) 40 MG capsule Take 1 capsule by mouth daily, Starting Fri 6/23/2017, Historical Med      phentermine 15 MG capsule every 24 hours, Starting Wed 5/16/2018, Historical Med       !! - Potential duplicate medications found  Please discuss with provider                PDMP Review     None          ED Provider  Electronically Signed by           Baltazar Little MD  03/17/22 7971

## 2022-07-09 ENCOUNTER — APPOINTMENT (EMERGENCY)
Dept: RADIOLOGY | Facility: HOSPITAL | Age: 42
End: 2022-07-09
Payer: COMMERCIAL

## 2022-07-09 ENCOUNTER — HOSPITAL ENCOUNTER (EMERGENCY)
Facility: HOSPITAL | Age: 42
Discharge: HOME | End: 2022-07-09
Attending: EMERGENCY MEDICINE | Admitting: EMERGENCY MEDICINE
Payer: COMMERCIAL

## 2022-07-09 ENCOUNTER — APPOINTMENT (EMERGENCY)
Dept: CT IMAGING | Facility: HOSPITAL | Age: 42
End: 2022-07-09
Payer: COMMERCIAL

## 2022-07-09 VITALS
SYSTOLIC BLOOD PRESSURE: 124 MMHG | OXYGEN SATURATION: 98 % | RESPIRATION RATE: 19 BRPM | BODY MASS INDEX: 44.89 KG/M2 | WEIGHT: 286.6 LBS | TEMPERATURE: 98 F | DIASTOLIC BLOOD PRESSURE: 73 MMHG | HEART RATE: 73 BPM

## 2022-07-09 DIAGNOSIS — R30.0 DYSURIA: ICD-10-CM

## 2022-07-09 DIAGNOSIS — R06.00 DYSPNEA: Primary | ICD-10-CM

## 2022-07-09 DIAGNOSIS — N39.0 UTI (URINARY TRACT INFECTION): ICD-10-CM

## 2022-07-09 DIAGNOSIS — I82.403 DVT OF LOWER EXTREMITY, BILATERAL (HCC): ICD-10-CM

## 2022-07-09 LAB
2HR DELTA HS TROPONIN: >0 NG/L
ALBUMIN SERPL BCP-MCNC: 3.2 G/DL (ref 3.5–5)
ALP SERPL-CCNC: 80 U/L (ref 46–116)
ALT SERPL W P-5'-P-CCNC: 30 U/L (ref 12–78)
ANION GAP SERPL CALCULATED.3IONS-SCNC: 7 MMOL/L (ref 4–13)
AST SERPL W P-5'-P-CCNC: 14 U/L (ref 5–45)
ATRIAL RATE: 69 BPM
ATRIAL RATE: 71 BPM
BACTERIA UR QL AUTO: ABNORMAL /HPF
BASOPHILS # BLD AUTO: 0.05 THOUSANDS/ΜL (ref 0–0.1)
BASOPHILS NFR BLD AUTO: 0 % (ref 0–1)
BILIRUB SERPL-MCNC: 0.52 MG/DL (ref 0.2–1)
BILIRUB UR QL STRIP: NEGATIVE
BUN SERPL-MCNC: 9 MG/DL (ref 5–25)
CALCIUM ALBUM COR SERPL-MCNC: 9.2 MG/DL (ref 8.3–10.1)
CALCIUM SERPL-MCNC: 8.6 MG/DL (ref 8.3–10.1)
CARDIAC TROPONIN I PNL SERPL HS: 2 NG/L
CARDIAC TROPONIN I PNL SERPL HS: <2 NG/L
CHLORIDE SERPL-SCNC: 103 MMOL/L (ref 100–108)
CLARITY UR: CLEAR
CO2 SERPL-SCNC: 28 MMOL/L (ref 21–32)
COLOR UR: YELLOW
CREAT SERPL-MCNC: 0.84 MG/DL (ref 0.6–1.3)
EOSINOPHIL # BLD AUTO: 0.14 THOUSAND/ΜL (ref 0–0.61)
EOSINOPHIL NFR BLD AUTO: 1 % (ref 0–6)
ERYTHROCYTE [DISTWIDTH] IN BLOOD BY AUTOMATED COUNT: 16.9 % (ref 11.6–15.1)
EXT PREG TEST URINE: NEGATIVE
EXT. CONTROL ED NAV: NORMAL
GFR SERPL CREATININE-BSD FRML MDRD: 85 ML/MIN/1.73SQ M
GLUCOSE SERPL-MCNC: 119 MG/DL (ref 65–140)
GLUCOSE UR STRIP-MCNC: NEGATIVE MG/DL
HCT VFR BLD AUTO: 44.3 % (ref 34.8–46.1)
HGB BLD-MCNC: 13.2 G/DL (ref 11.5–15.4)
HGB UR QL STRIP.AUTO: ABNORMAL
IMM GRANULOCYTES # BLD AUTO: 0.03 THOUSAND/UL (ref 0–0.2)
IMM GRANULOCYTES NFR BLD AUTO: 0 % (ref 0–2)
KETONES UR STRIP-MCNC: NEGATIVE MG/DL
LEUKOCYTE ESTERASE UR QL STRIP: ABNORMAL
LYMPHOCYTES # BLD AUTO: 3.32 THOUSANDS/ΜL (ref 0.6–4.47)
LYMPHOCYTES NFR BLD AUTO: 30 % (ref 14–44)
MCH RBC QN AUTO: 24.3 PG (ref 26.8–34.3)
MCHC RBC AUTO-ENTMCNC: 29.8 G/DL (ref 31.4–37.4)
MCV RBC AUTO: 81 FL (ref 82–98)
MONOCYTES # BLD AUTO: 0.46 THOUSAND/ΜL (ref 0.17–1.22)
MONOCYTES NFR BLD AUTO: 4 % (ref 4–12)
MUCOUS THREADS UR QL AUTO: ABNORMAL
NEUTROPHILS # BLD AUTO: 7.25 THOUSANDS/ΜL (ref 1.85–7.62)
NEUTS SEG NFR BLD AUTO: 65 % (ref 43–75)
NITRITE UR QL STRIP: NEGATIVE
NON-SQ EPI CELLS URNS QL MICRO: ABNORMAL /HPF
NRBC BLD AUTO-RTO: 0 /100 WBCS
P AXIS: 66 DEGREES
P AXIS: 68 DEGREES
PH UR STRIP.AUTO: 6 [PH]
PLATELET # BLD AUTO: 328 THOUSANDS/UL (ref 149–390)
PMV BLD AUTO: 8.5 FL (ref 8.9–12.7)
POTASSIUM SERPL-SCNC: 3.7 MMOL/L (ref 3.5–5.3)
PR INTERVAL: 162 MS
PR INTERVAL: 164 MS
PROT SERPL-MCNC: 7.7 G/DL (ref 6.4–8.2)
PROT UR STRIP-MCNC: NEGATIVE MG/DL
QRS AXIS: 23 DEGREES
QRS AXIS: 35 DEGREES
QRSD INTERVAL: 82 MS
QRSD INTERVAL: 82 MS
QT INTERVAL: 374 MS
QT INTERVAL: 378 MS
QTC INTERVAL: 405 MS
QTC INTERVAL: 406 MS
RBC # BLD AUTO: 5.44 MILLION/UL (ref 3.81–5.12)
RBC #/AREA URNS AUTO: ABNORMAL /HPF
SODIUM SERPL-SCNC: 138 MMOL/L (ref 136–145)
SP GR UR STRIP.AUTO: >=1.03 (ref 1–1.03)
T WAVE AXIS: 59 DEGREES
T WAVE AXIS: 59 DEGREES
UROBILINOGEN UR QL STRIP.AUTO: 0.2 E.U./DL
VENTRICULAR RATE: 69 BPM
VENTRICULAR RATE: 71 BPM
WBC # BLD AUTO: 11.25 THOUSAND/UL (ref 4.31–10.16)
WBC #/AREA URNS AUTO: ABNORMAL /HPF

## 2022-07-09 PROCEDURE — 81001 URINALYSIS AUTO W/SCOPE: CPT | Performed by: EMERGENCY MEDICINE

## 2022-07-09 PROCEDURE — 96372 THER/PROPH/DIAG INJ SC/IM: CPT

## 2022-07-09 PROCEDURE — 74177 CT ABD & PELVIS W/CONTRAST: CPT

## 2022-07-09 PROCEDURE — 81025 URINE PREGNANCY TEST: CPT | Performed by: EMERGENCY MEDICINE

## 2022-07-09 PROCEDURE — 99285 EMERGENCY DEPT VISIT HI MDM: CPT

## 2022-07-09 PROCEDURE — 87491 CHLMYD TRACH DNA AMP PROBE: CPT | Performed by: EMERGENCY MEDICINE

## 2022-07-09 PROCEDURE — 80053 COMPREHEN METABOLIC PANEL: CPT | Performed by: EMERGENCY MEDICINE

## 2022-07-09 PROCEDURE — 99285 EMERGENCY DEPT VISIT HI MDM: CPT | Performed by: EMERGENCY MEDICINE

## 2022-07-09 PROCEDURE — 36415 COLL VENOUS BLD VENIPUNCTURE: CPT

## 2022-07-09 PROCEDURE — 85025 COMPLETE CBC W/AUTO DIFF WBC: CPT | Performed by: EMERGENCY MEDICINE

## 2022-07-09 PROCEDURE — 93010 ELECTROCARDIOGRAM REPORT: CPT | Performed by: INTERNAL MEDICINE

## 2022-07-09 PROCEDURE — 96360 HYDRATION IV INFUSION INIT: CPT

## 2022-07-09 PROCEDURE — 84484 ASSAY OF TROPONIN QUANT: CPT | Performed by: EMERGENCY MEDICINE

## 2022-07-09 PROCEDURE — 96361 HYDRATE IV INFUSION ADD-ON: CPT

## 2022-07-09 PROCEDURE — 93005 ELECTROCARDIOGRAM TRACING: CPT

## 2022-07-09 PROCEDURE — 71045 X-RAY EXAM CHEST 1 VIEW: CPT

## 2022-07-09 PROCEDURE — 71275 CT ANGIOGRAPHY CHEST: CPT

## 2022-07-09 PROCEDURE — 87591 N.GONORRHOEAE DNA AMP PROB: CPT | Performed by: EMERGENCY MEDICINE

## 2022-07-09 PROCEDURE — G1004 CDSM NDSC: HCPCS

## 2022-07-09 RX ORDER — NITROFURANTOIN 25; 75 MG/1; MG/1
100 CAPSULE ORAL ONCE
Status: COMPLETED | OUTPATIENT
Start: 2022-07-09 | End: 2022-07-09

## 2022-07-09 RX ORDER — DOXYCYCLINE HYCLATE 100 MG/1
100 CAPSULE ORAL 2 TIMES DAILY
Qty: 14 CAPSULE | Refills: 0 | Status: SHIPPED | OUTPATIENT
Start: 2022-07-09 | End: 2022-07-16

## 2022-07-09 RX ORDER — GENTAMICIN SULFATE 40 MG/ML
120 INJECTION, SOLUTION INTRAMUSCULAR; INTRAVENOUS ONCE
Status: COMPLETED | OUTPATIENT
Start: 2022-07-09 | End: 2022-07-09

## 2022-07-09 RX ORDER — GENTAMICIN SULFATE 40 MG/ML
240 INJECTION, SOLUTION INTRAMUSCULAR; INTRAVENOUS ONCE
Status: DISCONTINUED | OUTPATIENT
Start: 2022-07-09 | End: 2022-07-09 | Stop reason: DRUGHIGH

## 2022-07-09 RX ORDER — NITROFURANTOIN 25; 75 MG/1; MG/1
100 CAPSULE ORAL 2 TIMES DAILY
Qty: 14 CAPSULE | Refills: 0 | Status: SHIPPED | OUTPATIENT
Start: 2022-07-09 | End: 2022-07-16

## 2022-07-09 RX ORDER — DOXYCYCLINE HYCLATE 100 MG/1
100 CAPSULE ORAL ONCE
Status: COMPLETED | OUTPATIENT
Start: 2022-07-09 | End: 2022-07-09

## 2022-07-09 RX ADMIN — SODIUM CHLORIDE 1000 ML: 0.9 INJECTION, SOLUTION INTRAVENOUS at 17:56

## 2022-07-09 RX ADMIN — IOHEXOL 75 ML: 350 INJECTION, SOLUTION INTRAVENOUS at 19:01

## 2022-07-09 RX ADMIN — GENTAMICIN SULFATE 120 MG: 40 INJECTION, SOLUTION INTRAMUSCULAR; INTRAVENOUS at 18:26

## 2022-07-09 RX ADMIN — GENTAMICIN SULFATE 120 MG: 40 INJECTION, SOLUTION INTRAMUSCULAR; INTRAVENOUS at 18:25

## 2022-07-09 RX ADMIN — NITROFURANTOIN (MONOHYDRATE/MACROCRYSTALS) 100 MG: 75; 25 CAPSULE ORAL at 20:41

## 2022-07-09 RX ADMIN — DOXYCYCLINE 100 MG: 100 CAPSULE ORAL at 17:50

## 2022-07-09 NOTE — ED PROVIDER NOTES
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 as well as lifelong anticoagulation on Lovenox, asthma, iron deficiency anemia, will lumbar laminectomy with history of MSSA and peptostreptococcus status post 6 weeks daptomycin, chronic methadone dependency coming in today with concern complaints of progressive shortness of breath and dyspnea on exertion that is been going on for several weeks  Patient reports that she has been compliant with her medications and has no acute change  No recent surgeries, fevers, chills, cough  No active chest pain but states that she does get some chest tightness with prolonged exertion  She has no palpitations or syncope  No lower extremity edema  Patient is COVID vaccinated has no exposure to COVID  Patient pulled me aside outside her room in ass that her boyfriend not be present for this conversation  She states that she noted that he but she did on her and she was having sex with him  She states that she has been having urgency, dysuria  She denies any abnormal vaginal bleeding spotting or discharge  She wants no personal questions asked in front of boyfriend her asked him to leave the room    She is agreeable for GC testing as well as empiric treatment      History provided by:  Patient  Shortness of Breath  Severity:  Mild  Onset quality:  Gradual  Timing:  Constant  Progression:  Worsening  Chronicity:  Recurrent  Context: not activity, not animal exposure, not emotional upset, not fumes, not known allergens, not occupational exposure, not pollens, not smoke exposure, not strong odors, not URI and not weather changes    Relieved by:  None tried  Worsened by:  Nothing  Ineffective treatments:  None tried  Associated symptoms: no abdominal pain, no chest pain, no claudication, no cough, no diaphoresis, no ear pain, no fever, no headaches, no hemoptysis, no neck pain, no PND, no rash, no sore throat, no sputum production, no syncope, no swollen glands, no vomiting and no wheezing    Risk factors: family hx of DVT, hx of PE/DVT and obesity    Risk factors: no recent alcohol use, no hx of cancer, no oral contraceptive use, no prolonged immobilization, no recent surgery and no tobacco use        Prior to Admission Medications   Prescriptions Last Dose Informant Patient Reported? Taking? HYDROcodone-Acetaminophen (VICODIN PO)   Yes No   Sig: every 4 (four) hours   Patient not taking: Reported on 3/17/2022    Methocarbamol (ROBAXIN-750 PO)   Yes No   Sig: Robaxin-750   q 6hr   Multiple Vitamins-Minerals (ONE DAILY WOMENS PO)   Yes No   Sig: Take 1 tablet by mouth daily   apixaban (ELIQUIS) 5 mg   Yes No   Sig: Take 5 mg by mouth 2 (two) times a day  Patient not taking: Reported on 12/10/2021    apixaban (ELIQUIS) 5 mg   No No   Sig: Take 1 tablet (5 mg total) by mouth 2 (two) times a day   Patient not taking: Reported on 12/10/2021    atorvastatin (LIPITOR) 20 mg tablet   Yes No   Sig: Take 1 tablet by mouth   Patient not taking: Reported on 3/17/2022    buPROPion (WELLBUTRIN SR) 150 mg 12 hr tablet   Yes No   Sig: Take by mouth   Patient not taking: Reported on 3/17/2022    fluocinonide (LIDEX) 0 05 % ointment   Yes No   Sig: Apply topically   Patient not taking: Reported on 3/17/2022    furosemide (LASIX) 20 mg tablet   Yes No   Sig: Take 1 tablet by mouth daily   Patient not taking: Reported on 3/17/2022    methadone (METHADOSE) 40 MG disintegrating tablet   Yes No   Sig: Take 80 mg by mouth daily   Patient not taking: Reported on 3/17/2022    methocarbamol (ROBAXIN) 750 mg tablet   Yes No   Sig: methocarbamol 750 mg tablet   TAKE 1 TABLET (750 MG TOTAL) BY MOUTH 4 (FOUR) TIMES A DAY AS NEEDED FOR MUSCLE SPASMS     naproxen (Naprosyn) 500 mg tablet   No No   Sig: Take 1 tablet (500 mg total) by mouth 2 (two) times a day with meals   omeprazole (PriLOSEC) 40 MG capsule   Yes No   Sig: Take 1 capsule by mouth daily   phentermine 15 MG capsule   Yes No   Sig: every 24 hours      Facility-Administered Medications: None       Past Medical History:   Diagnosis Date    Asthma     Hx of blood clots     Psoriasis     Spinal stenosis        Past Surgical History:   Procedure Laterality Date    APPENDECTOMY      BACK SURGERY       SECTION      x 3    HYSTERECTOMY  2015    Radical       History reviewed  No pertinent family history  I have reviewed and agree with the history as documented  E-Cigarette/Vaping    E-Cigarette Use Never User      E-Cigarette/Vaping Substances     Social History     Tobacco Use    Smoking status: Former Smoker     Packs/day: 0 25     Types: Cigarettes    Smokeless tobacco: Never Used   Vaping Use    Vaping Use: Never used   Substance Use Topics    Alcohol use: No    Drug use: No       Review of Systems   Constitutional: Negative  Negative for chills, diaphoresis and fever  HENT: Negative  Negative for ear pain and sore throat  Eyes: Negative  Negative for pain and visual disturbance  Respiratory: Positive for shortness of breath  Negative for cough, hemoptysis, sputum production and wheezing  Cardiovascular: Negative  Negative for chest pain, palpitations, claudication, syncope and PND  Gastrointestinal: Negative  Negative for abdominal pain and vomiting  Genitourinary: Negative  Negative for dysuria and hematuria  Musculoskeletal: Negative  Negative for arthralgias, back pain and neck pain  Skin: Negative  Negative for color change and rash  Neurological: Negative  Negative for seizures, syncope and headaches  Hematological: Negative  Psychiatric/Behavioral: Negative  All other systems reviewed and are negative  Physical Exam  Physical Exam  Vitals and nursing note reviewed     Constitutional: General: She is not in acute distress  Appearance: She is well-developed  HENT:      Head: Normocephalic and atraumatic  Comments: Patient maintaining airway and secretions  No stridor   No brawniness under tongue  Eyes:      Conjunctiva/sclera: Conjunctivae normal    Cardiovascular:      Rate and Rhythm: Normal rate and regular rhythm  Pulses:           Radial pulses are 2+ on the right side and 2+ on the left side  Dorsalis pedis pulses are 2+ on the right side and 2+ on the left side  Heart sounds: S1 normal and S2 normal  No murmur heard  Pulmonary:      Effort: Pulmonary effort is normal  No respiratory distress  Breath sounds: Examination of the right-lower field reveals decreased breath sounds  Examination of the left-lower field reveals decreased breath sounds  Decreased breath sounds present  Comments: No conversational dyspnea  Abdominal:      Palpations: Abdomen is soft  Tenderness: There is no abdominal tenderness  Musculoskeletal:      Cervical back: Neck supple  Right lower leg: No edema  Left lower leg: No edema  Skin:     General: Skin is warm and dry  Neurological:      General: No focal deficit present  Mental Status: She is alert and oriented to person, place, and time  GCS: GCS eye subscore is 4  GCS verbal subscore is 5  GCS motor subscore is 6  Cranial Nerves: Cranial nerves are intact  Sensory: Sensation is intact  Motor: Motor function is intact  Coordination: Coordination is intact  Gait: Gait is intact  Comments: No slurred speech  No facial asymmetry  No ataxia     Psychiatric:         Mood and Affect: Mood normal          Behavior: Behavior normal          Vital Signs  ED Triage Vitals [07/09/22 1439]   Temperature Pulse Respirations Blood Pressure SpO2   98 °F (36 7 °C) 68 20 135/61 98 %      Temp Source Heart Rate Source Patient Position - Orthostatic VS BP Location FiO2 (%)   Oral Monitor Sitting Right arm --      Pain Score       No Pain           Vitals:    07/09/22 1439 07/09/22 1602 07/09/22 1920   BP: 135/61 105/63 124/73   Pulse: 68 89 73   Patient Position - Orthostatic VS: Sitting  Sitting         Visual Acuity      ED Medications  Medications   sodium chloride 0 9 % bolus 1,000 mL (0 mL Intravenous Stopped 7/9/22 1946)   doxycycline hyclate (VIBRAMYCIN) capsule 100 mg (100 mg Oral Given 7/9/22 1750)   gentamicin (GARAMYCIN) 40 mg/mL injection 120 mg (120 mg Intramuscular Given 7/9/22 1825)     And   gentamicin (GARAMYCIN) 40 mg/mL injection 120 mg (120 mg Intramuscular Given 7/9/22 1826)   iohexol (OMNIPAQUE) 350 MG/ML injection (SINGLE-DOSE) 75 mL (75 mL Intravenous Given 7/9/22 1901)   nitrofurantoin (MACROBID) extended-release capsule 100 mg (100 mg Oral Given 7/9/22 2041)       Diagnostic Studies  Results Reviewed     Procedure Component Value Units Date/Time    HS Troponin I 2hr [[id number]]  (Normal) Collected: 07/09/22 1802    Lab Status: Final result Specimen: Blood from Arm, Right Updated: 07/09/22 1834     hs TnI 2hr 2 ng/L      Delta 2hr hsTnI >0 ng/L     Urine Microscopic [[id number]]  (Abnormal) Collected: 07/09/22 1742    Lab Status: Final result Specimen: Urine, Clean Catch Updated: 07/09/22 1757     RBC, UA 4-10 /hpf      WBC, UA 20-30 /hpf      Epithelial Cells Occasional /hpf      Bacteria, UA Occasional /hpf      MUCUS THREADS Occasional    UA (URINE) with reflex to Scope [[id number]]  (Abnormal) Collected: 07/09/22 1742    Lab Status: Final result Specimen: Urine, Clean Catch Updated: 07/09/22 1750     Color, UA Yellow     Clarity, UA Clear     Specific Gravity, UA >=1 030     pH, UA 6 0     Leukocytes, UA Small     Nitrite, UA Negative     Protein, UA Negative mg/dl      Glucose, UA Negative mg/dl      Ketones, UA Negative mg/dl      Urobilinogen, UA 0 2 E U /dl      Bilirubin, UA Negative     Occult Blood, UA Moderate    Chlamydia/GC amplified DNA by PCR [[id number]] Collected: 07/09/22 1742    Lab Status:  In process Specimen: Urine, Other Updated: 07/09/22 1745    POCT pregnancy, urine [[id number]]  (Normal) Resulted: 07/09/22 1738    Lab Status: Final result Updated: 07/09/22 1738     EXT PREG TEST UR (Ref: Negative) negative     Control valid    HS Troponin 0hr (reflex protocol) [[id number]]  (Normal) Collected: 07/09/22 1601    Lab Status: Final result Specimen: Blood from Arm, Right Updated: 07/09/22 1634     hs TnI 0hr <2 ng/L     Comprehensive metabolic panel [[id number]]  (Abnormal) Collected: 07/09/22 1601    Lab Status: Final result Specimen: Blood from Arm, Right Updated: 07/09/22 1627     Sodium 138 mmol/L      Potassium 3 7 mmol/L      Chloride 103 mmol/L      CO2 28 mmol/L      ANION GAP 7 mmol/L      BUN 9 mg/dL      Creatinine 0 84 mg/dL      Glucose 119 mg/dL      Calcium 8 6 mg/dL      Corrected Calcium 9 2 mg/dL      AST 14 U/L      ALT 30 U/L      Alkaline Phosphatase 80 U/L      Total Protein 7 7 g/dL      Albumin 3 2 g/dL      Total Bilirubin 0 52 mg/dL      eGFR 85 ml/min/1 73sq m     Narrative:      [place] guidelines for Chronic Kidney Disease (CKD):     Stage 1 with normal or high GFR (GFR > 90 mL/min/1 73 square meters)    Stage 2 Mild CKD (GFR = 60-89 mL/min/1 73 square meters)    Stage 3A Moderate CKD (GFR = 45-59 mL/min/1 73 square meters)    Stage 3B Moderate CKD (GFR = 30-44 mL/min/1 73 square meters)    Stage 4 Severe CKD (GFR = 15-29 mL/min/1 73 square meters)    Stage 5 End Stage CKD (GFR <15 mL/min/1 73 square meters)  Note: GFR calculation is accurate only with a steady state creatinine    CBC and differential [[id number]]  (Abnormal) Collected: 07/09/22 1601    Lab Status: Final result Specimen: Blood from Arm, Right Updated: 07/09/22 1611     WBC 11 25 Thousand/uL      RBC 5 44 Million/uL      Hemoglobin 13 2 g/dL      Hematocrit 44 3 %      MCV 81 fL      MCH 24 3 pg      MCHC 29 8 g/dL      RDW 16 9 %      MPV 8 5 fL      Platelets 147 Thousands/uL      nRBC 0 /100 WBCs      Neutrophils Relative 65 %      Immat GRANS % 0 %      Lymphocytes Relative 30 %      Monocytes Relative 4 %      Eosinophils Relative 1 %      Basophils Relative 0 %      Neutrophils Absolute 7 25 Thousands/µL      Immature Grans Absolute 0 03 Thousand/uL      Lymphocytes Absolute 3 32 Thousands/µL      Monocytes Absolute 0 46 Thousand/µL      Eosinophils Absolute 0 14 Thousand/µL      Basophils Absolute 0 05 Thousands/µL                  CT pe study w abdomen pelvis w contrast   Final Result by Rafi Arteaga MD (07/09 2005)         1  No evidence of lung consolidation or pulmonary embolism  2  Hepatomegaly  3  Surgical changes of prior hysterectomy  Left adnexal/ovarian cystic structure, correlate with nonemergent outpatient pelvic ultrasound  Workstation performed: [id number]         XR chest 1 view portable    (Results Pending)              Procedures  Procedures         ED Course  ED Course as of 07/10/22 0050   Sat Jul 09, 2022   1733 Patient is a 42-year-old female with a history of multiple pop to PEs on Xarelto chronically as well as filter coming in today complaining of worsening shortness breath and dyspnea on exertion for several weeks  Patient has multiple PEs, filter and on anticoagulation  Patient states that there planning to remove the filter at some time  On exam well appearing in no acute distress  Hemodynamically stable  Will proceed right to CT rule out PE given moderate wells and positive perc  Will also obtain EKG labs as well as urine  Patient pulled me aside and discuss that she does not want anything personal be brought up in front of her boyfriend  She reports that she would like to be tested for STDs and treated      Portions of the record may have been created with voice recognition software   Occasional wrong word or "sound a like" substitutions may have occurred due to the inherent limitations of voice recognition software  Read the chart carefully and recognize, using context, where substitutions have occurred  1744 Patient has an allergy to cephalosporin  Will give doxy as well as 1 time gentamicin  mg     1749 Patient did have an echocardiogram at [facility] in 2021 with EF greater than 50%   1840 2nd Trop at 2 with noted delta change  Will dc Trop # 3   1925 Patient ambulating t/o the villalobos in no distress and no COLEMAN  Pending CT read   1952 Patient is asking when she can leave  Patient's labs are stable however pending CT results  Patient also has dysuria and he frequency there has leukocytes wbc's and bacteria will treat empirically with antibiotics as well as continue doxy at home for possible STD exposure   2014 No evidence of PE or intra-abdominal pathology and CT  Will refer back to PCP  She does have an adnexal lesion which will need outpatient follow-up  Will give 1st dose of Keflex as well as DC home with doxy and Keflex  Will give her instructions to take acidophilus as well           patient was discharged however I did send her an e-mail regarding her CT scan showing left adnexal mass and need for follow-up with PCP and gyn      HEART Risk Score    Flowsheet Row Most Recent Value   Heart Score Risk Calculator    History 0 Filed at: 07/09/2022 1744   ECG 1 Filed at: 07/09/2022 1744   Age 1 Filed at: 07/09/2022 1744   Risk Factors 1 Filed at: 07/09/2022 1744   Troponin 0 Filed at: 07/09/2022 1744   HEART Score 3 Filed at: 07/09/2022 1744                PERC Rule for PE    Flowsheet Row Most Recent Value   PERC Rule for PE    Age >=50 0 Filed at: 07/09/2022 1733   HR >=100 0 Filed at: 07/09/2022 1733   O2 Sat on room air < 95% 0 Filed at: 07/09/2022 1733   History of PE or DVT 1 Filed at: 07/09/2022 1733   Recent trauma or surgery 0 Filed at: 07/09/2022 1733   Hemoptysis 0 Filed at: 07/09/2022 1733   Exogenous estrogen 0 Filed at: 07/09/2022 1733 Unilateral leg swelling 0 Filed at: 07/09/2022 1733   PERC Rule for PE Results 1 Filed at: 07/09/2022 1733                  Edd' Criteria for PE    Flowsheet Row Most Recent Value   Wells' Criteria for PE    Clinical signs and symptoms of DVT 0 Filed at: 07/09/2022 1733   PE is primary diagnosis or equally likely 3 Filed at: 07/09/2022 1733   HR >100 0 Filed at: 07/09/2022 1733   Immobilization at least 3 days or Surgery in the previous 4 weeks 0 Filed at: 07/09/2022 1733   Previous, objectively diagnosed PE or DVT 1 5 Filed at: 07/09/2022 1733   Hemoptysis 0 Filed at: 07/09/2022 1733   Malignancy with treatment within 6 months or palliative 0 Filed at: 07/09/2022 1733   Stanislav Criteria Total 4 5 Filed at: 07/09/2022 1733                MDM  Number of Diagnoses or Management Options  Diagnosis management comments:     EKG INTERPRETATION @ 1738  RHYTHM:  Normal sinus rhythm at 70 beats per minute  AXIS:  Normal axis  INTERVALS:  VA interval measured at 164 milliseconds  QRS COMPLEX:  QRS measured at 82 milliseconds  ST SEGMENT:  Nonspecific ST segment changes  Diffuse artifact  QT INTERVAL:  QTC measured at 406 milliseconds  COMPARED WITH PRIOR no acute change from May of 2019  Interpretation by Pauly Gardner DO    Differential diagnosis includes but not limited to:  COPD exacerbation, asthma exacerbation, pneumonia, PE, pulmonary edema, pulmonary effusions, lung mass/malignancy, CHF, ACS, NSTEMI, acute kidney injury, electrolyte dysfunction, inhalation injury, anemia, valvular disorder, viral etiology,      EKG INTERPRETATION  @ 1758  RHYTHM:  Normal sinus rhythm at 70 beats per minute  AXIS:  Normal axis  INTERVALS:  VA interval measured at 162 milliseconds  QRS COMPLEX:  QRS measured at 82 milliseconds  ST SEGMENT:  Nonspecific ST segment changes    Diffuse artifact  QT INTERVAL:  QTC measured at 405 milliseconds  COMPARED WITH PRIOR no acute change from prior  Interpretation by Pauly Gardner DO Amount and/or Complexity of Data Reviewed  Clinical lab tests: ordered and reviewed  Tests in the radiology section of CPT®: reviewed and ordered  Tests in the medicine section of CPT®: ordered and reviewed  Review and summarize past medical records: yes  Independent visualization of images, tracings, or specimens: yes        Disposition  Final diagnoses:   Dyspnea   UTI (urinary tract infection)   Dysuria   DVT of lower extremity, bilateral ([address] )     Time reflects when diagnosis was documented in both MDM as applicable and the Disposition within this note     Time User Action Codes Description Comment    7/9/2022  8:15 PM BendGerri weiss Add [R06 00] Dyspnea     7/9/2022  8:15 PM BendJessica weiss L Add [N39 0] UTI (urinary tract infection)     7/9/2022  8:15 PM BendJessica weiss Add [R30 0] Dysuria     7/9/2022  8:16 PM BendJordyn weiss Add [I82 403] DVT of lower extremity, bilateral [facility])       ED Disposition     ED Disposition   Discharge    Condition   Stable    Date/Time   Sat Jul 9, 2022  8:15 PM    Comment   Ese Salazar discharge to home/self care                 Follow-up Information     Follow up With Specialties Details Why Contact Info Additional [address] [facility] Family Medicine Schedule an appointment as soon as possible for a visit in 1 week  [address], [address] [facility] [id number]  [address] [place] [address], [address], [address] [place], [place], [address]    Lebron Kruger MD Family Medicine In 1 week  [address]  [address] [facility] [address]              Discharge Medication List as of 7/9/2022  8:16 PM      START taking these medications    Details   doxycycline hyclate (VIBRAMYCIN) 100 mg capsule Take 1 capsule (100 mg total) by mouth 2 (two) times a day for 7 days, Starting Sat 7/9/2022, Until Sat 7/16/2022, Normal      nitrofurantoin (MACROBID) 100 mg capsule Take 1 capsule (100 mg total) by mouth 2 (two) times a day for 7 days, Starting Sat 7/9/2022, Until Sat 7/16/2022, Normal         CONTINUE these medications which have NOT CHANGED    Details   !! apixaban (ELIQUIS) 5 mg Take 5 mg by mouth 2 (two) times a day , Until Discontinued, Historical Med      !! apixaban (ELIQUIS) 5 mg Take 1 tablet (5 mg total) by mouth 2 (two) times a day, Starting Fri 5/10/2019, Print      atorvastatin (LIPITOR) 20 mg tablet Take 1 tablet by mouth, Starting Thu 4/17/2014, Historical Med      buPROPion (WELLBUTRIN SR) 150 mg 12 hr tablet Take by mouth, Starting Thu 6/22/2017, Historical Med      fluocinonide (LIDEX) 0 05 % ointment Apply topically, Starting Fri 6/23/2017, Historical Med      furosemide (LASIX) 20 mg tablet Take 1 tablet by mouth daily, Starting Mon 7/31/2017, Historical Med      HYDROcodone-Acetaminophen (VICODIN PO) every 4 (four) hours, Historical Med      methadone (METHADOSE) 40 MG disintegrating tablet Take 80 mg by mouth daily, Historical Med      !! methocarbamol (ROBAXIN) 750 mg tablet methocarbamol 750 mg tablet   TAKE 1 TABLET (750 MG TOTAL) BY MOUTH 4 (FOUR) TIMES A DAY AS NEEDED FOR MUSCLE SPASMS , Historical Med      !! Methocarbamol (ROBAXIN-750 PO) Robaxin-750   q 6hr, Historical Med      Multiple Vitamins-Minerals (ONE DAILY WOMENS PO) Take 1 tablet by mouth daily, Starting Fri 10/3/2014, Historical Med      naproxen (Naprosyn) 500 mg tablet Take 1 tablet (500 mg total) by mouth 2 (two) times a day with meals, Starting Thu 3/17/2022, Normal      omeprazole (PriLOSEC) 40 MG capsule Take 1 capsule by mouth daily, Starting Fri 6/23/2017, Historical Med      phentermine 15 MG capsule every 24 hours, Starting Wed 5/16/2018, Historical Med       !! - Potential duplicate medications found  Please discuss with provider  No discharge procedures on file      PDMP Review     None          ED Provider  Electronically Signed by           Teresa Moore [facility], [place]   07/10/22 0050

## 2022-07-10 LAB
C TRACH DNA SPEC QL NAA+PROBE: NEGATIVE
N GONORRHOEA DNA SPEC QL NAA+PROBE: NEGATIVE

## 2022-07-10 NOTE — DISCHARGE INSTRUCTIONS
MAKE SURE YOU COMPLETE FULL COURSE OF BOTH ANTIBIOTICS  START TAKING ACIDOPHILUS OR LACTOBACILLUS TO HELP PREVENT YEAST INFECTION  MAKE SURE YOU CALL YOUR FAMILY DOCTOR FOR FATHER FOLLOW-UP FOR REGARDING YOUR SHORTNESS OF BREATH

## 2022-09-06 ENCOUNTER — APPOINTMENT (EMERGENCY)
Dept: CT IMAGING | Facility: HOSPITAL | Age: 42
End: 2022-09-06
Payer: COMMERCIAL

## 2022-09-06 ENCOUNTER — HOSPITAL ENCOUNTER (EMERGENCY)
Facility: HOSPITAL | Age: 42
Discharge: HOME | End: 2022-09-06
Attending: EMERGENCY MEDICINE
Payer: COMMERCIAL

## 2022-09-06 VITALS
SYSTOLIC BLOOD PRESSURE: 138 MMHG | DIASTOLIC BLOOD PRESSURE: 72 MMHG | OXYGEN SATURATION: 99 % | RESPIRATION RATE: 11 BRPM | TEMPERATURE: 98.3 F | WEIGHT: 279.32 LBS | BODY MASS INDEX: 43.75 KG/M2 | HEART RATE: 64 BPM

## 2022-09-06 DIAGNOSIS — I26.99 ACUTE PULMONARY EMBOLISM (HCC): ICD-10-CM

## 2022-09-06 DIAGNOSIS — J06.9 VIRAL URI WITH COUGH: Primary | ICD-10-CM

## 2022-09-06 LAB
ALBUMIN SERPL BCP-MCNC: 3.9 G/DL (ref 3.5–5)
ALP SERPL-CCNC: 88 U/L (ref 43–122)
ALT SERPL W P-5'-P-CCNC: 28 U/L
ANION GAP SERPL CALCULATED.3IONS-SCNC: 8 MMOL/L (ref 5–14)
APTT PPP: 26 SECONDS (ref 23–37)
AST SERPL W P-5'-P-CCNC: 27 U/L (ref 14–36)
ATRIAL RATE: 59 BPM
BASOPHILS # BLD AUTO: 0.08 THOUSANDS/ΜL (ref 0–0.1)
BASOPHILS NFR BLD AUTO: 1 % (ref 0–1)
BILIRUB SERPL-MCNC: 0.2 MG/DL (ref 0.2–1)
BUN SERPL-MCNC: 13 MG/DL (ref 5–25)
CALCIUM SERPL-MCNC: 9.2 MG/DL (ref 8.4–10.2)
CARDIAC TROPONIN I PNL SERPL HS: <2 NG/L
CHLORIDE SERPL-SCNC: 104 MMOL/L (ref 96–108)
CO2 SERPL-SCNC: 28 MMOL/L (ref 21–32)
CREAT SERPL-MCNC: 0.82 MG/DL (ref 0.6–1.2)
EOSINOPHIL # BLD AUTO: 0.25 THOUSAND/ΜL (ref 0–0.61)
EOSINOPHIL NFR BLD AUTO: 3 % (ref 0–6)
ERYTHROCYTE [DISTWIDTH] IN BLOOD BY AUTOMATED COUNT: 16.2 % (ref 11.6–15.1)
GFR SERPL CREATININE-BSD FRML MDRD: 87 ML/MIN/1.73SQ M
GLUCOSE SERPL-MCNC: 97 MG/DL (ref 70–99)
HCT VFR BLD AUTO: 43.9 % (ref 34.8–46.1)
HGB BLD-MCNC: 13.3 G/DL (ref 11.5–15.4)
IMM GRANULOCYTES # BLD AUTO: 0.03 THOUSAND/UL (ref 0–0.2)
IMM GRANULOCYTES NFR BLD AUTO: 0 % (ref 0–2)
INR PPP: 1.01 (ref 0.84–1.19)
LYMPHOCYTES # BLD AUTO: 3.59 THOUSANDS/ΜL (ref 0.6–4.47)
LYMPHOCYTES NFR BLD AUTO: 38 % (ref 14–44)
MCH RBC QN AUTO: 24.5 PG (ref 26.8–34.3)
MCHC RBC AUTO-ENTMCNC: 30.3 G/DL (ref 31.4–37.4)
MCV RBC AUTO: 81 FL (ref 82–98)
MONOCYTES # BLD AUTO: 0.67 THOUSAND/ΜL (ref 0.17–1.22)
MONOCYTES NFR BLD AUTO: 7 % (ref 4–12)
NEUTROPHILS # BLD AUTO: 4.73 THOUSANDS/ΜL (ref 1.85–7.62)
NEUTS SEG NFR BLD AUTO: 51 % (ref 43–75)
NRBC BLD AUTO-RTO: 0 /100 WBCS
P AXIS: 53 DEGREES
PLATELET # BLD AUTO: 345 THOUSANDS/UL (ref 149–390)
PMV BLD AUTO: 9 FL (ref 8.9–12.7)
POTASSIUM SERPL-SCNC: 4.3 MMOL/L (ref 3.5–5.3)
PR INTERVAL: 168 MS
PROT SERPL-MCNC: 7.4 G/DL (ref 6.4–8.4)
PROTHROMBIN TIME: 13.6 SECONDS (ref 11.6–14.5)
QRS AXIS: -2 DEGREES
QRSD INTERVAL: 82 MS
QT INTERVAL: 420 MS
QTC INTERVAL: 415 MS
RBC # BLD AUTO: 5.42 MILLION/UL (ref 3.81–5.12)
SARS-COV-2 RNA RESP QL NAA+PROBE: NEGATIVE
SODIUM SERPL-SCNC: 140 MMOL/L (ref 135–147)
T WAVE AXIS: 38 DEGREES
VENTRICULAR RATE: 59 BPM
WBC # BLD AUTO: 9.35 THOUSAND/UL (ref 4.31–10.16)

## 2022-09-06 PROCEDURE — 93010 ELECTROCARDIOGRAM REPORT: CPT | Performed by: STUDENT IN AN ORGANIZED HEALTH CARE EDUCATION/TRAINING PROGRAM

## 2022-09-06 PROCEDURE — G1004 CDSM NDSC: HCPCS

## 2022-09-06 PROCEDURE — 84484 ASSAY OF TROPONIN QUANT: CPT | Performed by: EMERGENCY MEDICINE

## 2022-09-06 PROCEDURE — U0003 INFECTIOUS AGENT DETECTION BY NUCLEIC ACID (DNA OR RNA); SEVERE ACUTE RESPIRATORY SYNDROME CORONAVIRUS 2 (SARS-COV-2) (CORONAVIRUS DISEASE [COVID-19]), AMPLIFIED PROBE TECHNIQUE, MAKING USE OF HIGH THROUGHPUT TECHNOLOGIES AS DESCRIBED BY CMS-2020-01-R: HCPCS | Performed by: EMERGENCY MEDICINE

## 2022-09-06 PROCEDURE — 96374 THER/PROPH/DIAG INJ IV PUSH: CPT

## 2022-09-06 PROCEDURE — 80053 COMPREHEN METABOLIC PANEL: CPT | Performed by: EMERGENCY MEDICINE

## 2022-09-06 PROCEDURE — 96360 HYDRATION IV INFUSION INIT: CPT

## 2022-09-06 PROCEDURE — 96361 HYDRATE IV INFUSION ADD-ON: CPT

## 2022-09-06 PROCEDURE — 71275 CT ANGIOGRAPHY CHEST: CPT

## 2022-09-06 PROCEDURE — 96372 THER/PROPH/DIAG INJ SC/IM: CPT

## 2022-09-06 PROCEDURE — 85610 PROTHROMBIN TIME: CPT | Performed by: EMERGENCY MEDICINE

## 2022-09-06 PROCEDURE — 85025 COMPLETE CBC W/AUTO DIFF WBC: CPT | Performed by: EMERGENCY MEDICINE

## 2022-09-06 PROCEDURE — 99285 EMERGENCY DEPT VISIT HI MDM: CPT | Performed by: EMERGENCY MEDICINE

## 2022-09-06 PROCEDURE — U0005 INFEC AGEN DETEC AMPLI PROBE: HCPCS | Performed by: EMERGENCY MEDICINE

## 2022-09-06 PROCEDURE — 93005 ELECTROCARDIOGRAM TRACING: CPT

## 2022-09-06 PROCEDURE — 85730 THROMBOPLASTIN TIME PARTIAL: CPT | Performed by: EMERGENCY MEDICINE

## 2022-09-06 PROCEDURE — 36415 COLL VENOUS BLD VENIPUNCTURE: CPT | Performed by: EMERGENCY MEDICINE

## 2022-09-06 PROCEDURE — 99284 EMERGENCY DEPT VISIT MOD MDM: CPT

## 2022-09-06 RX ORDER — KETOROLAC TROMETHAMINE 30 MG/ML
15 INJECTION, SOLUTION INTRAMUSCULAR; INTRAVENOUS ONCE
Status: COMPLETED | OUTPATIENT
Start: 2022-09-06 | End: 2022-09-06

## 2022-09-06 RX ORDER — ENOXAPARIN SODIUM 150 MG/ML
1 INJECTION SUBCUTANEOUS ONCE
Status: COMPLETED | OUTPATIENT
Start: 2022-09-06 | End: 2022-09-06

## 2022-09-06 RX ADMIN — IOHEXOL 80 ML: 350 INJECTION, SOLUTION INTRAVENOUS at 20:08

## 2022-09-06 RX ADMIN — ENOXAPARIN SODIUM 135 MG: 150 INJECTION SUBCUTANEOUS at 21:07

## 2022-09-06 RX ADMIN — SODIUM CHLORIDE 1000 ML: 0.9 INJECTION, SOLUTION INTRAVENOUS at 19:37

## 2022-09-06 RX ADMIN — KETOROLAC TROMETHAMINE 15 MG: 30 INJECTION, SOLUTION INTRAMUSCULAR; INTRAVENOUS at 19:59

## 2022-09-06 NOTE — ED PROVIDER NOTES
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 with an IVC filter that is suspected to have migrated presenting for evaluation of cough  Patient states this morning she developed a nonproductive cough  Patient also had a fever yesterday with a T-max of a 102°  Patient has been taking around the clock acetaminophen and ibuprofen with improvement in her fever  Patient notes headache in addition to sore throat, congestion, sharp left-sided chest pain, and shortness of breath was is worse on exertion  Patient states 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 Taking? HYDROcodone-Acetaminophen (VICODIN PO)   Yes No   Sig: every 4 (four) hours   Patient not taking: Reported on 3/17/2022    Methocarbamol (ROBAXIN-750 PO)   Yes No   Sig: Robaxin-750   q 6hr   Multiple Vitamins-Minerals (ONE DAILY WOMENS PO)   Yes No   Sig: Take 1 tablet by mouth daily   apixaban (ELIQUIS) 5 mg   Yes No   Sig: Take 5 mg by mouth 2 (two) times a day     Patient not taking: Reported on 12/10/2021    apixaban (ELIQUIS) 5 mg   No No   Sig: Take 1 tablet (5 mg total) by mouth 2 (two) times a day   Patient not taking: Reported on 12/10/2021    atorvastatin (LIPITOR) 20 mg tablet   Yes No   Sig: Take 1 tablet by mouth   Patient not taking: Reported on 3/17/2022    buPROPion (WELLBUTRIN SR) 150 mg 12 hr tablet   Yes No   Sig: Take by mouth   Patient not taking: Reported on 3/17/2022    fluocinonide (LIDEX) 0 05 % ointment   Yes No   Sig: Apply topically   Patient not taking: Reported on 3/17/2022    furosemide (LASIX) 20 mg tablet   Yes No   Sig: Take 1 tablet by mouth daily   Patient not taking: Reported on 3/17/2022    methadone (METHADOSE) 40 MG disintegrating tablet   Yes No   Sig: Take 80 mg by mouth daily   Patient not taking: Reported on 3/17/2022    methocarbamol (ROBAXIN) 750 mg tablet   Yes No   Sig: methocarbamol 750 mg tablet   TAKE 1 TABLET (750 MG TOTAL) BY MOUTH 4 (FOUR) TIMES A DAY AS NEEDED FOR MUSCLE SPASMS  naproxen (Naprosyn) 500 mg tablet   No No   Sig: Take 1 tablet (500 mg total) by mouth 2 (two) times a day with meals   omeprazole (PriLOSEC) 40 MG capsule   Yes No   Sig: Take 1 capsule by mouth daily   phentermine 15 MG capsule   Yes No   Sig: every 24 hours      Facility-Administered Medications: None       Past Medical History:   Diagnosis Date    Asthma     Hx of blood clots     Psoriasis     Spinal stenosis        Past Surgical History:   Procedure Laterality Date    APPENDECTOMY      BACK SURGERY       SECTION      x 3    HYSTERECTOMY  2015    Radical       History reviewed  No pertinent family history  I have reviewed and agree with the history as documented  E-Cigarette/Vaping    E-Cigarette Use Never User      E-Cigarette/Vaping Substances     Social History     Tobacco Use    Smoking status: Former Smoker     Packs/day: 0 25     Types: Cigarettes    Smokeless tobacco: Never Used   Vaping Use    Vaping Use: Never used   Substance Use Topics    Alcohol use: No    Drug use: No       Review of Systems   Constitutional: Positive for fever  Negative for chills  HENT: Positive for congestion and sore throat  Negative for rhinorrhea  Eyes: Negative for pain, discharge and visual disturbance  Respiratory: Positive for cough and shortness of breath      Cardiovascular: Positive for chest pain  Negative for palpitations and leg swelling  Gastrointestinal: Negative for abdominal pain, diarrhea, nausea and vomiting  Genitourinary: Negative for dysuria, frequency and hematuria  Musculoskeletal: Negative for arthralgias and myalgias  Skin: Negative for color change and rash  Neurological: Positive for headaches  Negative for dizziness, weakness, light-headedness and numbness  Hematological: Does not bruise/bleed easily  Physical Exam  Physical Exam  Vitals and nursing note reviewed  Constitutional:       General: She is not in acute distress  Appearance: Normal appearance  She is ill-appearing  She is not diaphoretic  HENT:      Head: Normocephalic and atraumatic  Nose: Congestion present  Mouth/Throat:      Mouth: Mucous membranes are moist       Pharynx: Posterior oropharyngeal erythema present  No oropharyngeal exudate  Comments: Uvula midline  Eyes:      General: No scleral icterus  Extraocular Movements: Extraocular movements intact  Conjunctiva/sclera: Conjunctivae normal       Pupils: Pupils are equal, round, and reactive to light  Cardiovascular:      Rate and Rhythm: Normal rate and regular rhythm  Pulmonary:      Effort: Pulmonary effort is normal  No respiratory distress  Breath sounds: Normal breath sounds  No wheezing, rhonchi or rales  Abdominal:      General: There is no distension  Palpations: Abdomen is soft  Tenderness: There is no abdominal tenderness  There is no guarding or rebound  Musculoskeletal:         General: No swelling, tenderness or deformity  Cervical back: Neck supple  Right lower leg: No edema  Left lower leg: No edema  Skin:     General: Skin is warm and dry  Findings: No rash  Neurological:      General: No focal deficit present  Mental Status: She is alert and oriented to person, place, and time  Mental status is at baseline     Psychiatric:         Mood and Affect: Mood normal          Behavior: Behavior normal          Vital Signs  ED Triage Vitals [09/06/22 1906]   Temperature Pulse Respirations Blood Pressure SpO2   98 3 °F (36 8 °C) 64 20 138/72 97 %      Temp Source Heart Rate Source Patient Position - Orthostatic VS BP Location FiO2 (%)   Oral Monitor Sitting Left arm --      Pain Score       8           Vitals:    09/06/22 1906 09/06/22 2000 09/06/22 2045 09/06/22 2100   BP: 138/72      Pulse: 64 59 58 64   Patient Position - Orthostatic VS: Sitting            Visual Acuity      ED Medications  Medications   sodium chloride 0 9 % bolus 1,000 mL (1,000 mL Intravenous New Bag 9/6/22 1937)   ketorolac (TORADOL) injection 15 mg (15 mg Intravenous Given 9/6/22 1959)   iohexol (OMNIPAQUE) 350 MG/ML injection (MULTI-DOSE) 80 mL (80 mL Intravenous Given 9/6/22 2008)   enoxaparin (LOVENOX) subcutaneous injection 135 mg (135 mg Subcutaneous Given 9/6/22 2107)       Diagnostic Studies  Results Reviewed     Procedure Component Value Units Date/Time    COVID only [[id number]]  (Normal) Collected: 09/06/22 1935    Lab Status: Final result Specimen: Nares from Nose Updated: 09/06/22 2038     SARS-CoV-2 Negative    Narrative:      FOR PEDIATRIC PATIENTS - copy/paste COVID Guidelines URL to browser: [email or web address]    SARS-CoV-2 assay is a Nucleic Acid Amplification assay intended for the  qualitative detection of nucleic acid from SARS-CoV-2 in nasopharyngeal  swabs  Results are for the presumptive identification of SARS-CoV-2 RNA  Positive results are indicative of infection with SARS-CoV-2, the virus  causing COVID-19, but do not rule out bacterial infection or co-infection  with other viruses  Laboratories within the [place] and its  territories are required to report all positive results to the appropriate  public health authorities   Negative results do not preclude SARS-CoV-2  infection and should not be used as the sole basis for treatment or other  patient management decisions  Negative results must be combined with  clinical observations, patient history, and epidemiological information  This test has not been FDA cleared or approved  This test has been authorized by FDA under an Emergency Use Authorization  (EUA)  This test is only authorized for the duration of time the  declaration that circumstances exist justifying the authorization of the  emergency use of an in vitro diagnostic tests for detection of SARS-CoV-2  virus and/or diagnosis of COVID-19 infection under section 564(b)(1) of  the Act, 21 U  S C  593QOJ-0(P)(3), unless the authorization is terminated  or revoked sooner  The test has been validated but independent review by FDA  and CLIA is pending  Test performed using Pose GeneXpert: This RT-PCR assay targets N2,  a region unique to SARS-CoV-2  A conserved region in the E-gene was chosen  for pan-Sarbecovirus detection which includes SARS-CoV-2      HS Troponin I 2hr [[id number]]     Lab Status: No result Specimen: Blood     HS Troponin I 4hr [[id number]]     Lab Status: No result Specimen: Blood     HS Troponin 0hr (reflex protocol) [[id number]]  (Normal) Collected: 09/06/22 1935    Lab Status: Final result Specimen: Blood from Arm, Right Updated: 09/06/22 2008     hs TnI 0hr <2 ng/L     Comprehensive metabolic panel [[id number]] Collected: 09/06/22 1935    Lab Status: Final result Specimen: Blood from Arm, Right Updated: 09/06/22 2002     Sodium 140 mmol/L      Potassium 4 3 mmol/L      Chloride 104 mmol/L      CO2 28 mmol/L      ANION GAP 8 mmol/L      BUN 13 mg/dL      Creatinine 0 82 mg/dL      Glucose 97 mg/dL      Calcium 9 2 mg/dL      AST 27 U/L      ALT 28 U/L      Alkaline Phosphatase 88 U/L      Total Protein 7 4 g/dL      Albumin 3 9 g/dL      Total Bilirubin 0 20 mg/dL      eGFR 87 ml/min/1 73sq m     Narrative:      [place] guidelines for Chronic Kidney Disease (CKD):     Stage 1 with normal or high GFR (GFR > 90 mL/min/1 73 square meters)    Stage 2 Mild CKD (GFR = 60-89 mL/min/1 73 square meters)    Stage 3A Moderate CKD (GFR = 45-59 mL/min/1 73 square meters)    Stage 3B Moderate CKD (GFR = 30-44 mL/min/1 73 square meters)    Stage 4 Severe CKD (GFR = 15-29 mL/min/1 73 square meters)    Stage 5 End Stage CKD (GFR <15 mL/min/1 73 square meters)  Note: GFR calculation is accurate only with a steady state creatinine    Protime-INR [[id number]]  (Normal) Collected: 09/06/22 1935    Lab Status: Final result Specimen: Blood from Arm, Right Updated: 09/06/22 1958     Protime 13 6 seconds      INR 1 01    APTT [[id number]]  (Normal) Collected: 09/06/22 1935    Lab Status: Final result Specimen: Blood from Arm, Right Updated: 09/06/22 1958     PTT 26 seconds     CBC and differential [[id number]]  (Abnormal) Collected: 09/06/22 1935    Lab Status: Final result Specimen: Blood from Arm, Right Updated: 09/06/22 1948     WBC 9 35 Thousand/uL      RBC 5 42 Million/uL      Hemoglobin 13 3 g/dL      Hematocrit 43 9 %      MCV 81 fL      MCH 24 5 pg      MCHC 30 3 g/dL      RDW 16 2 %      MPV 9 0 fL      Platelets 893 Thousands/uL      nRBC 0 /100 WBCs      Neutrophils Relative 51 %      Immat GRANS % 0 %      Lymphocytes Relative 38 %      Monocytes Relative 7 %      Eosinophils Relative 3 %      Basophils Relative 1 %      Neutrophils Absolute 4 73 Thousands/µL      Immature Grans Absolute 0 03 Thousand/uL      Lymphocytes Absolute 3 59 Thousands/µL      Monocytes Absolute 0 67 Thousand/µL      Eosinophils Absolute 0 25 Thousand/µL      Basophils Absolute 0 08 Thousands/µL                  CTA ED chest PE study   Final Result by Casimiro Malhotra DO (09/06 2052)   Small pulmonary embolus in a peripheral 5th order branch artery supplying the basilar RIGHT lower lobe image 5/101        The calculated ratio of right ventricular to left ventricular diameter (RV/LV ratio) is 1 3  This is greater than 0 9, which is abnormal and indicates right heart strain  An abnormal RV/LV ratio has been shown to be associated with an increased risk of    30 day mortality in the setting of acute pulmonary embolism  Urgent consultation with the medical critical care team is recommended  I personally informed Christopher Walter on 9/6/2022 via Tiger text with confirmation at 8:51 PM                Workstation performed: [id number]                    Procedures  ECG 12 Lead Documentation Only    Date/Time: 9/6/2022 7:50 PM  Performed by: Hieu Larson MD  Authorized by: Hieu Larson MD     Indications / Diagnosis:  Chest pain  ECG reviewed by me, the ED Provider: yes    Patient location:  ED  Previous ECG:     Previous ECG:  Compared to current    Comparison ECG info:  7/9/2020    Similarity:  No change  Interpretation:     Interpretation: normal    Rate:     ECG rate:  59    ECG rate assessment: normal    Rhythm:     Rhythm: sinus rhythm    Ectopy:     Ectopy: none    QRS:     QRS axis:  Normal    QRS intervals:  Normal  Conduction:     Conduction: normal    ST segments:     ST segments:  Normal  T waves:     T waves: normal               ED Course  ED Course as of 09/06/22 2116   Tue Sep 06, 2022   1948 CBC and differential(!)  Grossly normal   2011 hs TnI 0hr: <2   2011 POCT INR: 1 01   2012 Comprehensive metabolic panel  Grossly normal   2043 SARS-COV-2: Negative   2051 Per rad, + RLL PE   2101 CTA ED chest PE study  IMPRESSION:  Small pulmonary embolus in a peripheral 5th order branch artery supplying the basilar RIGHT lower lobe image 5/101      The calculated ratio of right ventricular to left ventricular diameter (RV/LV ratio) is 1 3  This is greater than 0 9, which is abnormal and indicates right heart strain   An abnormal RV/LV ratio has been shown to be associated with an increased risk of   30 day mortality in the setting of acute pulmonary embolism  Urgent consultation with the medical critical care team is recommended  HEART Risk Score    Flowsheet Row Most Recent Value   Heart Score Risk Calculator    History 0 Filed at: 09/06/2022 2012   ECG 0 Filed at: 09/06/2022 2012   Age 0 Filed at: 09/06/2022 2012   Risk Factors 1 Filed at: 09/06/2022 2012   Troponin 0 Filed at: 09/06/2022 2012   HEART Score 1 Filed at: 09/06/2022 2012                PERC Rule for PE    Flowsheet Row Most Recent Value   PERC Rule for PE    Age >=50 0 Filed at: 09/06/2022 2012   HR >=100 0 Filed at: 09/06/2022 2012   O2 Sat on room air < 95% 0 Filed at: 09/06/2022 2012   History of PE or DVT 1 Filed at: 09/06/2022 2012   Recent trauma or surgery 0 Filed at: 09/06/2022 2012   Hemoptysis 0 Filed at: 09/06/2022 2012   Exogenous estrogen 0 Filed at: 09/06/2022 2012   Unilateral leg swelling 0 Filed at: 09/06/2022 2012   PERC Rule for PE Results 1 Filed at: 09/06/2022 2012                  Jane Lester' Criteria for PE    Flowsheet Row Most Recent Value   Wells' Criteria for PE    Clinical signs and symptoms of DVT 0 Filed at: 09/06/2022 1918   PE is primary diagnosis or equally likely 3 Filed at: 09/06/2022 1918   HR >100 0 Filed at: 09/06/2022 1918   Immobilization at least 3 days or Surgery in the previous 4 weeks 0 Filed at: 09/06/2022 1918   Previous, objectively diagnosed PE or DVT 1 5 Filed at: 09/06/2022 1918   Hemoptysis 0 Filed at: 09/06/2022 1918   Malignancy with treatment within 6 months or palliative 0 Filed at: 09/06/2022 1918   Wells' Criteria Total 4 5 Filed at: 09/06/2022 1918                MDM  Number of Diagnoses or Management Options  Acute pulmonary embolism ([facility] [address] )  Viral URI with cough  Diagnosis management comments: 43-year-old female with a history of DVT/PE presenting for evaluation of cough in the context of viral upper respiratory symptoms and a positive contact with COVID-19    Clinically, her presentation is suspicious for a viral infection, but she is at high risk for DVT/PE and has been off of her anticoagulation for 1 month  She notes sharp chest pain in addition to dyspnea on exertion  Patient is not PERC negative, and she is at moderate risk according to Beau Foot criteria  I feel it is prudent to rule out pulmonary embolism as a cause for symptoms  Differential diagnosis would also include pericarditis, myocarditis, pleural effusion, pneumonia, anemia, ischemia, malignant dysrhythmia  EKG shows normal sinus rhythm without evidence of ischemia or malignant dysrhythmia  Initial troponin is negative  Heart score is 1; patient is at low risk for major acute cardiac event next 30 days  CBC and CMP are grossly normal   Patient is COVID negative  CTA of the chest was performed which is notable for an acute right lower lobe pulmonary embolism with evidence of right heart strain on the CT scan  I wish to initiate the patient on heparin and admit her to the hospital for further care, as she is at high risk given her underlying coagulopathy and recurrent DVT/PEs not currently on anticoagulation  However, patient states that she needs to leave to arrange care for her son  Patient states she will return sometime overnight or tomorrow to be admitted for further care but that she cannot be admitted at this time  Discussed with patient that her findings are concerning and she requires additional workup and echocardiography to determine significance of right heart strain  Also discussed patient will require anticoagulation  Patient still would like to leave  Patient has capacity to make decisions at this time  Discussed risks of leaving, including death, pulmonary infarction, worsening blood clot burden, clot migration, heart failure, fall, disability  Patient accepts these risks and is in agreement and can repeat them back to me  Therefore, patient will be discharged against medical advice    Patient given a dose of weight based Lovenox prior to discharge for anticoagulation  Patient states she will return within 24 hours for further care  Patient counseled to return with any new or worsening symptoms  Patient is in agreement and understanding of these instructions  Disposition  Final diagnoses:   Viral URI with cough   Acute pulmonary embolism ([address] )     Time reflects when diagnosis was documented in both MDM as applicable and the Disposition within this note     Time User Action Codes Description Comment    9/6/2022  9:00 PM Kim Palencia Add [J06 9] Viral URI with cough     9/6/2022  9:00 PM Kim Palencia Add [I26 99] Acute pulmonary embolism [facility])       ED Disposition     ED Disposition   AMA    Condition   --    Date/Time   Tue Sep 6, 2022  9:00 PM    Comment   Date: 9/6/2022  Patient: Annelise Barnett  Admitted: 9/6/2022  7:06 PM  Attending Provider: Kim Bailey*    Annelise Barnett or her authorized caregiver has made the decision for the patient to leave the emergency department agains t the advice of her attending physician  She or her authorized caregiver has been informed and understands the inherent risks, including death, lung infarction, additional PE, respiratory distress, chest pain, dyspnea, clot migration  She or her aut horized caregiver has decided to accept the responsibility for this decision  Annelise Barnett and all necessary parties have been advised that she may return for further evaluation or treatment  Her condition at time of discharge was stable  Kayce durgaaleks Mcbride had current vital signs as follows:  /72 (BP Location: Left arm)   Pulse 58   Temp 98 3 °F (36 8 °C) (Oral)   Resp 14   Wt 127 kg (279 lb 5 2 oz)            Follow-up Information    None         Patient's Medications   Discharge Prescriptions    No medications on file       No discharge procedures on file      PDMP Review     None          ED Provider  Electronically Signed by           Igor Mendoza MD  09/06/22 9365

## 2022-09-06 NOTE — Clinical Note
Helga Elizabeth was seen and treated in our emergency department on 9/6/2022  Diagnosis:     Julian  may return to work on return date  She may return on this date: 09/07/2022    Patient is COVID negative  If you have any questions or concerns, please don't hesitate to call        Lexy Marroquin MD    ______________________________           _______________          _______________  Hospital Representative                              Date                                Time

## 2022-09-07 NOTE — ED NOTES
Patient returned from [facility]       Charmayne Call, RN  09/06/22 2030

## 2022-09-18 ENCOUNTER — HOSPITAL ENCOUNTER (INPATIENT)
Facility: HOSPITAL | Age: 42
LOS: 1 days | Discharge: HOME | DRG: 134 | End: 2022-09-19
Attending: EMERGENCY MEDICINE | Admitting: FAMILY MEDICINE
Payer: COMMERCIAL

## 2022-09-18 ENCOUNTER — APPOINTMENT (EMERGENCY)
Dept: CT IMAGING | Facility: HOSPITAL | Age: 42
DRG: 134 | End: 2022-09-18
Payer: COMMERCIAL

## 2022-09-18 DIAGNOSIS — I26.99 ACUTE PULMONARY EMBOLISM (HCC): ICD-10-CM

## 2022-09-18 DIAGNOSIS — I26.99 PULMONARY EMBOLISM (HCC): Primary | ICD-10-CM

## 2022-09-18 DIAGNOSIS — M79.669 CALF PAIN: ICD-10-CM

## 2022-09-18 LAB
APTT PPP: 27 SECONDS (ref 23–37)
BASOPHILS # BLD AUTO: 0.06 THOUSANDS/ΜL (ref 0–0.1)
BASOPHILS NFR BLD AUTO: 1 % (ref 0–1)
EOSINOPHIL # BLD AUTO: 0.18 THOUSAND/ΜL (ref 0–0.61)
EOSINOPHIL NFR BLD AUTO: 2 % (ref 0–6)
ERYTHROCYTE [DISTWIDTH] IN BLOOD BY AUTOMATED COUNT: 16.2 % (ref 11.6–15.1)
HCT VFR BLD AUTO: 44.7 % (ref 34.8–46.1)
HGB BLD-MCNC: 13.2 G/DL (ref 11.5–15.4)
IMM GRANULOCYTES # BLD AUTO: 0.03 THOUSAND/UL (ref 0–0.2)
IMM GRANULOCYTES NFR BLD AUTO: 0 % (ref 0–2)
INR PPP: 0.99 (ref 0.84–1.19)
LYMPHOCYTES # BLD AUTO: 3.54 THOUSANDS/ΜL (ref 0.6–4.47)
LYMPHOCYTES NFR BLD AUTO: 36 % (ref 14–44)
MCH RBC QN AUTO: 24.4 PG (ref 26.8–34.3)
MCHC RBC AUTO-ENTMCNC: 29.5 G/DL (ref 31.4–37.4)
MCV RBC AUTO: 83 FL (ref 82–98)
MONOCYTES # BLD AUTO: 0.65 THOUSAND/ΜL (ref 0.17–1.22)
MONOCYTES NFR BLD AUTO: 7 % (ref 4–12)
NEUTROPHILS # BLD AUTO: 5.4 THOUSANDS/ΜL (ref 1.85–7.62)
NEUTS SEG NFR BLD AUTO: 54 % (ref 43–75)
NRBC BLD AUTO-RTO: 0 /100 WBCS
PLATELET # BLD AUTO: 325 THOUSANDS/UL (ref 149–390)
PMV BLD AUTO: 8.6 FL (ref 8.9–12.7)
PROTHROMBIN TIME: 13.4 SECONDS (ref 11.6–14.5)
RBC # BLD AUTO: 5.4 MILLION/UL (ref 3.81–5.12)
WBC # BLD AUTO: 9.86 THOUSAND/UL (ref 4.31–10.16)

## 2022-09-18 PROCEDURE — 83735 ASSAY OF MAGNESIUM: CPT | Performed by: PHYSICIAN ASSISTANT

## 2022-09-18 PROCEDURE — 85610 PROTHROMBIN TIME: CPT | Performed by: PHYSICIAN ASSISTANT

## 2022-09-18 PROCEDURE — 85025 COMPLETE CBC W/AUTO DIFF WBC: CPT | Performed by: PHYSICIAN ASSISTANT

## 2022-09-18 PROCEDURE — 99285 EMERGENCY DEPT VISIT HI MDM: CPT

## 2022-09-18 PROCEDURE — 99285 EMERGENCY DEPT VISIT HI MDM: CPT | Performed by: PHYSICIAN ASSISTANT

## 2022-09-18 PROCEDURE — G1004 CDSM NDSC: HCPCS

## 2022-09-18 PROCEDURE — 93005 ELECTROCARDIOGRAM TRACING: CPT

## 2022-09-18 PROCEDURE — 71275 CT ANGIOGRAPHY CHEST: CPT

## 2022-09-18 PROCEDURE — 84484 ASSAY OF TROPONIN QUANT: CPT | Performed by: PHYSICIAN ASSISTANT

## 2022-09-18 PROCEDURE — 85730 THROMBOPLASTIN TIME PARTIAL: CPT | Performed by: PHYSICIAN ASSISTANT

## 2022-09-18 PROCEDURE — 36415 COLL VENOUS BLD VENIPUNCTURE: CPT | Performed by: PHYSICIAN ASSISTANT

## 2022-09-18 PROCEDURE — 80053 COMPREHEN METABOLIC PANEL: CPT | Performed by: PHYSICIAN ASSISTANT

## 2022-09-18 NOTE — LETTER
[address] [facility]  [address] [place] [id number] [phone]  Dept: [phone]    September 19, 2022     Patient: Mindy Gruber   YOB: 1980   Date of Visit: 9/18/2022     To Whom it May Concern:    Joann Olmedo is under my professional care  She was seen in the hospital from 9/18/2022   to 09/19/22  She may return to work on 9/22/2022 without limitations  If you have any questions or concerns, please don't hesitate to call           Sincerely,          Jaime Oseguera MD

## 2022-09-19 VITALS
DIASTOLIC BLOOD PRESSURE: 74 MMHG | HEART RATE: 64 BPM | TEMPERATURE: 97.2 F | SYSTOLIC BLOOD PRESSURE: 107 MMHG | WEIGHT: 272.05 LBS | BODY MASS INDEX: 45.33 KG/M2 | OXYGEN SATURATION: 95 % | RESPIRATION RATE: 18 BRPM | HEIGHT: 65 IN

## 2022-09-19 PROBLEM — R63.4 WEIGHT LOSS, UNINTENTIONAL: Status: ACTIVE | Noted: 2022-09-19

## 2022-09-19 PROBLEM — F19.11 HX OF SUBSTANCE ABUSE (HCC): Status: ACTIVE | Noted: 2022-09-19

## 2022-09-19 PROBLEM — I26.99 ACUTE PULMONARY EMBOLISM (HCC): Status: ACTIVE | Noted: 2022-09-19

## 2022-09-19 LAB
ALBUMIN SERPL BCP-MCNC: 4 G/DL (ref 3.5–5)
ALP SERPL-CCNC: 89 U/L (ref 43–122)
ALT SERPL W P-5'-P-CCNC: 24 U/L
AMPHETAMINES SERPL QL SCN: NEGATIVE
ANION GAP SERPL CALCULATED.3IONS-SCNC: 4 MMOL/L (ref 5–14)
ANION GAP SERPL CALCULATED.3IONS-SCNC: 7 MMOL/L (ref 5–14)
AST SERPL W P-5'-P-CCNC: 23 U/L (ref 14–36)
ATRIAL RATE: 74 BPM
BARBITURATES UR QL: NEGATIVE
BASOPHILS # BLD AUTO: 0.06 THOUSANDS/ΜL (ref 0–0.1)
BASOPHILS NFR BLD AUTO: 1 % (ref 0–1)
BENZODIAZ UR QL: NEGATIVE
BILIRUB SERPL-MCNC: 0.33 MG/DL (ref 0.2–1)
BUN SERPL-MCNC: 14 MG/DL (ref 5–25)
BUN SERPL-MCNC: 16 MG/DL (ref 5–25)
CALCIUM SERPL-MCNC: 8.7 MG/DL (ref 8.4–10.2)
CALCIUM SERPL-MCNC: 9.1 MG/DL (ref 8.4–10.2)
CARDIAC TROPONIN I PNL SERPL HS: <2 NG/L
CHLORIDE SERPL-SCNC: 102 MMOL/L (ref 96–108)
CHLORIDE SERPL-SCNC: 105 MMOL/L (ref 96–108)
CO2 SERPL-SCNC: 28 MMOL/L (ref 21–32)
CO2 SERPL-SCNC: 30 MMOL/L (ref 21–32)
COCAINE UR QL: NEGATIVE
CREAT SERPL-MCNC: 0.74 MG/DL (ref 0.6–1.2)
CREAT SERPL-MCNC: 1.01 MG/DL (ref 0.6–1.2)
EOSINOPHIL # BLD AUTO: 0.23 THOUSAND/ΜL (ref 0–0.61)
EOSINOPHIL NFR BLD AUTO: 3 % (ref 0–6)
ERYTHROCYTE [DISTWIDTH] IN BLOOD BY AUTOMATED COUNT: 16.4 % (ref 11.6–15.1)
GFR SERPL CREATININE-BSD FRML MDRD: 67 ML/MIN/1.73SQ M
GFR SERPL CREATININE-BSD FRML MDRD: 98 ML/MIN/1.73SQ M
GLUCOSE SERPL-MCNC: 98 MG/DL (ref 70–99)
GLUCOSE SERPL-MCNC: 98 MG/DL (ref 70–99)
HAV IGM SER QL: ABNORMAL
HBV CORE IGM SER QL: ABNORMAL
HBV SURFACE AG SER QL: ABNORMAL
HCT VFR BLD AUTO: 43.3 % (ref 34.8–46.1)
HCV AB SER QL: ABNORMAL
HGB BLD-MCNC: 13.2 G/DL (ref 11.5–15.4)
IMM GRANULOCYTES # BLD AUTO: 0.02 THOUSAND/UL (ref 0–0.2)
IMM GRANULOCYTES NFR BLD AUTO: 0 % (ref 0–2)
INR PPP: 1.01 (ref 0.84–1.19)
LYMPHOCYTES # BLD AUTO: 3.79 THOUSANDS/ΜL (ref 0.6–4.47)
LYMPHOCYTES NFR BLD AUTO: 46 % (ref 14–44)
MAGNESIUM SERPL-MCNC: 2 MG/DL (ref 1.6–2.3)
MCH RBC QN AUTO: 24.7 PG (ref 26.8–34.3)
MCHC RBC AUTO-ENTMCNC: 30.5 G/DL (ref 31.4–37.4)
MCV RBC AUTO: 81 FL (ref 82–98)
METHADONE UR QL: POSITIVE
MONOCYTES # BLD AUTO: 0.67 THOUSAND/ΜL (ref 0.17–1.22)
MONOCYTES NFR BLD AUTO: 8 % (ref 4–12)
NEUTROPHILS # BLD AUTO: 3.51 THOUSANDS/ΜL (ref 1.85–7.62)
NEUTS SEG NFR BLD AUTO: 42 % (ref 43–75)
NRBC BLD AUTO-RTO: 0 /100 WBCS
OPIATES UR QL SCN: NEGATIVE
OXYCODONE+OXYMORPHONE UR QL SCN: NEGATIVE
P AXIS: 61 DEGREES
PCP UR QL: NEGATIVE
PLATELET # BLD AUTO: 318 THOUSANDS/UL (ref 149–390)
PMV BLD AUTO: 9.4 FL (ref 8.9–12.7)
POTASSIUM SERPL-SCNC: 3.7 MMOL/L (ref 3.5–5.3)
POTASSIUM SERPL-SCNC: 4.3 MMOL/L (ref 3.5–5.3)
PR INTERVAL: 176 MS
PROT SERPL-MCNC: 7.7 G/DL (ref 6.4–8.4)
PROTHROMBIN TIME: 13.6 SECONDS (ref 11.6–14.5)
QRS AXIS: 2 DEGREES
QRSD INTERVAL: 84 MS
QT INTERVAL: 390 MS
QTC INTERVAL: 432 MS
RBC # BLD AUTO: 5.34 MILLION/UL (ref 3.81–5.12)
SODIUM SERPL-SCNC: 137 MMOL/L (ref 135–147)
SODIUM SERPL-SCNC: 139 MMOL/L (ref 135–147)
T WAVE AXIS: 52 DEGREES
THC UR QL: POSITIVE
VENTRICULAR RATE: 74 BPM
WBC # BLD AUTO: 8.28 THOUSAND/UL (ref 4.31–10.16)

## 2022-09-19 PROCEDURE — NC001 PR NO CHARGE: Performed by: FAMILY MEDICINE

## 2022-09-19 PROCEDURE — 99232 SBSQ HOSP IP/OBS MODERATE 35: CPT | Performed by: FAMILY MEDICINE

## 2022-09-19 PROCEDURE — 85610 PROTHROMBIN TIME: CPT

## 2022-09-19 PROCEDURE — 80074 ACUTE HEPATITIS PANEL: CPT

## 2022-09-19 PROCEDURE — 86480 TB TEST CELL IMMUN MEASURE: CPT

## 2022-09-19 PROCEDURE — 80307 DRUG TEST PRSMV CHEM ANLYZR: CPT

## 2022-09-19 PROCEDURE — 80048 BASIC METABOLIC PNL TOTAL CA: CPT

## 2022-09-19 PROCEDURE — 85025 COMPLETE CBC W/AUTO DIFF WBC: CPT

## 2022-09-19 PROCEDURE — 87389 HIV-1 AG W/HIV-1&-2 AB AG IA: CPT

## 2022-09-19 PROCEDURE — 97162 PT EVAL MOD COMPLEX 30 MIN: CPT

## 2022-09-19 PROCEDURE — 93010 ELECTROCARDIOGRAM REPORT: CPT | Performed by: INTERNAL MEDICINE

## 2022-09-19 RX ORDER — ENOXAPARIN SODIUM 100 MG/ML
INJECTION SUBCUTANEOUS
Status: COMPLETED
Start: 2022-09-19 | End: 2022-09-19

## 2022-09-19 RX ORDER — ENOXAPARIN SODIUM 150 MG/ML
1 INJECTION SUBCUTANEOUS EVERY 12 HOURS SCHEDULED
Status: DISCONTINUED | OUTPATIENT
Start: 2022-09-19 | End: 2022-09-19 | Stop reason: HOSPADM

## 2022-09-19 RX ORDER — ENOXAPARIN SODIUM 150 MG/ML
1 INJECTION SUBCUTANEOUS ONCE
Status: DISCONTINUED | OUTPATIENT
Start: 2022-09-19 | End: 2022-09-19 | Stop reason: HOSPADM

## 2022-09-19 RX ORDER — IBUPROFEN 400 MG/1
400 TABLET ORAL ONCE
Status: COMPLETED | OUTPATIENT
Start: 2022-09-19 | End: 2022-09-19

## 2022-09-19 RX ADMIN — ENOXAPARIN SODIUM 120 MG: 60 INJECTION SUBCUTANEOUS at 01:00

## 2022-09-19 RX ADMIN — FLUOCINONIDE: 0.5 CREAM TOPICAL at 09:29

## 2022-09-19 RX ADMIN — IBUPROFEN 400 MG: 400 TABLET ORAL at 11:05

## 2022-09-19 RX ADMIN — IOHEXOL 85 ML: 350 INJECTION, SOLUTION INTRAVENOUS at 00:16

## 2022-09-19 RX ADMIN — ENOXAPARIN SODIUM 120 MG: 150 INJECTION SUBCUTANEOUS at 09:28

## 2022-09-19 NOTE — CASE MANAGEMENT
Case Management Discharge Planning Note    Patient name Mindy Gruber  Location 7T /7T U 890-70 MRN [id number]  : 1980 Date 2022       Current Admission Date: 2022  Current Admission Diagnosis:Acute pulmonary embolism [facility])   Patient Active Problem List    Diagnosis Date Noted    Acute pulmonary embolism ([address] ) 2022    Weight loss, unintentional 2022    Hx of substance abuse ([address] ) 2022    DVT of lower extremity, bilateral ([place] [address] ) 2016    Asthma 2016    Cough 2016    URI (upper respiratory infection) 2016    Mass on back 2016    Hx of smoking 2016    Psoriasis 2016    Hx pulmonary embolism 2016    Low mean corpuscular volume (MCV) 2016    Presence of IVC filter 2016      LOS (days): 0  Geometric Mean LOS (GMLOS) (days):   Days to GMLOS:     OBJECTIVE:  Risk of Unplanned Readmission Score: 8 22         Current admission status: Inpatient   Preferred Pharmacy:   [address] [place] [address], [address] [facility]  [address] [address]  Phone: [phone] Fax: [phone] [address] [facility],[address], [address]  [address]  [address] [address]  Phone: [phone] Fax: [phone]    Primary Care Provider: Jaime Oseguera MD    Primary Insurance: Kiley Kincaid  Secondary Insurance:     DISCHARGE DETAILS: CM was notified at morning rounds that pt is medically cleared to be discharged today  Pt will be returning back to her previous environment  Pt has concerns for housing  CM gave pt resources for housing and food bank  Pt is meds to bed with no co-payments  Pt's meds were delivered from the pharmacy     Transportation: Family  CM reviewed Discharge planning process including the following: identifying help at home, patient preference for discharge planning needs, Discharge lounge, [facility] Meds to bed program, availability of treatment team to discuss questions or concerns patient and family may have regarding understanding medications and recognizing signs and symptoms once discharged  CM also encouraged pt to follow up with all recommended appointments after discharge  Patient advised of importance for patient and family to participate in managing pt's medical wellbeing     CM department will continue to follow through pts D/C

## 2022-09-19 NOTE — H&P
History and Physical - Saulo Kuhn    Patient Information: Shellie Santoro 38 y o  female MRN: [id number]  Unit/Bed#: 7T [facility] 718-01 Encounter: [id number]  Admitting Physician: Corrina Berger MD  PCP: Alie Steve MD  Date of Admission:  09/19/22    Assessment and Plan    * Acute pulmonary embolism [facility])  Assessment & Plan  CTA PE: "Interval decrease in pulmonary artery embolus with subsegmental branch of the right lower lobe "  IVC placement in 2014 at [facility] that has been misaligned and requires removal   2 weeks had CTA PE with PE right lobe and right heart strain which has since resolved  Home medication: Eliquis 5 mg BID- noncompliant    PLAN:   -lovenox 1mg/kg BID = 120 mg BID  -consider ECHO, to rule out any right heart strain   -consider consult to vascular surgery for IVC removal   -case management consult to assist with medication     Weight loss, unintentional  Assessment & Plan  Wt Readings from Last 3 Encounters:   09/18/22 123 kg (272 lb 1 6 oz)   09/06/22 127 kg (279 lb 5 2 oz)   07/09/22 130 kg (286 lb 9 6 oz)     -Six months ago weight was 301 lbs, has lost approx 30 pounds in last six months unintentionally  -CBC unremarkable   -Smoking history, no longer smoking  No nodules noticed on CTA     PLAN:   -depression screening   -consider colonoscopy and mammogram  -consider TSH at discharge     Hx of substance abuse ([facility] [address] )  Assessment & Plan  -completed methadone in 2021    PLAN:  -screen for Hepatitis  -avoid opiods for medical management     Psoriasis  Assessment & Plan  Home medication: Lidex 0 05% ointment    PLAN:   -c/w home meds     VTE Prophylaxis: Enoxaparin (Lovenox)  Code Status: Prior  Anticipated Length of Stay:  Patient will be admitted on an Inpatient basis with an anticipated length of stay of  less than 2 midnights       Justification for Hospital Stay: acute pulmonary embolism requiring inpatient treatment  Total Time for Visit, including Counseling / Coordination of Care: 60 mins  Greater than 50% of this total time spent on direct patient counseling and coordination of care  Chief Complaint:     Chief Complaint   Patient presents with    Shortness of Breath     Was seen last week for blood clots in legs, signed out AMA  Now with SOB, dizziness, chest pain   Chest Pain       Stabbing chest pain that comes and goes for 2 days     History of Present Illness:    Isabel Hollingsworth is a 38 y o  female with PMH of IVC placement (2014) not on anticoagulation due to insurance issues, extensive spinal surgery for spinal stenosis including lumbar laminectomy, lumbar disectomy, and  lumbar fusion in 03/2021, hx of IVDU, completed methadone in 2021 and psoriasis who presents with shortness of breath that began approximately one month ago, admitted for recurrent PE Patient reports inability to walk up the stairs without losing breath  Works with mentally challenged adults and two work contacts tested positive for covid which prompted a visit to the ED on 9/6/2022 (approximately two weeks ago);PE was found on right basilar lung with right heart strain  Patient left AMA and returned to ED 9/18 due to worsening shortness of breath  As well, patient reports losing 30 lbs in last two months without effort, believes it may be from stress because she has to move Friday  Denies night sweats, dark/bloody stools, dysuria, bloody phlegm  She does report her father was diagnosed with colon cancer in his 52's  Patient is them mother of four children  Additionally, during previous episodes, patients reports concomitant leg pain however this time reports that right leg pain began yesterday, Also reports that yesterday she knocked her forehead and started to have headaches afterwards       As per patient, all of this began in 2013 with multiple blood clots; IVC in 2015; and 03/2021 back surgery for spinal stenosis with subsequent staph infection with [facility]    ED COURSE: Vitals: stable temp: 98 3pulse: 84 RR: 18 BP: 123/75 SpO2 99% on room air while sitting, Imaging: CTA chest: interval decrease in pulmonary artery embolus with subsegmental branch of right lower lobe compared to study on 22 and no more right heart strain  Labs: The following labs were normal: PT, INR, PTT, Troponin at 0hr, Mg Meds: lovenox 1mg/kg for total of 120mg         Review of Systems:  Review of Systems   Constitutional: Positive for fatigue  Negative for diaphoresis and fever  Respiratory: Positive for cough and shortness of breath  Cardiovascular: Negative for chest pain, palpitations and leg swelling  Past Medical and Surgical History:   Past Medical History:   Diagnosis Date    Asthma     Hx of blood clots     Psoriasis     Spinal stenosis      Past Surgical History:   Procedure Laterality Date    APPENDECTOMY      BACK SURGERY       SECTION      x 3    HYSTERECTOMY  2015    Radical     Meds/Allergies: Allergies: Allergies   Allergen Reactions    Bee Venom Anaphylaxis and Other (See Comments)     throat swells    Vicodin [Hydrocodone-Acetaminophen] Anaphylaxis, Hives and Throat Swelling    Ceftriaxone Hives    Fish Allergy - Food Allergy Hives     Flounder and tilpia    Fish-Derived Products - Food Allergy Other (See Comments)     hives    Hydrocodone     Shellfish-Derived Products - Food Allergy Hives     Prior to Admission Medications   Prescriptions Last Dose Informant Patient Reported? Taking?    HYDROcodone-Acetaminophen (VICODIN PO) Not Taking at Unknown time  Yes No   Sig: every 4 (four) hours   Patient not taking: No sig reported   Methocarbamol (ROBAXIN-750 PO) More than a month at Unknown time  Yes No   Sig: Robaxin-750   q 6hr   Patient not taking: Reported on 2022   Multiple Vitamins-Minerals (ONE DAILY WOMENS PO) Not Taking at Unknown time  Yes No   Sig: Take 1 tablet by mouth daily   Patient not taking: Reported on 2022   apixaban (ELIQUIS) 5 mg More than a month at Unknown time  Yes No   Sig: Take 5 mg by mouth 2 (two) times a day  Patient not taking: No sig reported   apixaban (ELIQUIS) 5 mg Not Taking at Unknown time  No No   Sig: Take 1 tablet (5 mg total) by mouth 2 (two) times a day   Patient not taking: No sig reported   atorvastatin (LIPITOR) 20 mg tablet More than a month at Unknown time  Yes No   Sig: Take 1 tablet by mouth   Patient not taking: No sig reported   buPROPion (WELLBUTRIN SR) 150 mg 12 hr tablet More than a month at Unknown time  Yes No   Sig: Take by mouth   Patient not taking: No sig reported   fluocinonide (LIDEX) 0 05 % ointment Not Taking at Unknown time  Yes No   Sig: Apply topically   Patient not taking: No sig reported   furosemide (LASIX) 20 mg tablet Not Taking at Unknown time  Yes No   Sig: Take 1 tablet by mouth daily   Patient not taking: No sig reported   methadone (METHADOSE) 40 MG disintegrating tablet Not Taking at Unknown time  Yes No   Sig: Take 80 mg by mouth daily   Patient not taking: No sig reported   methocarbamol (ROBAXIN) 750 mg tablet Not Taking at Unknown time  Yes No   Sig: methocarbamol 750 mg tablet   TAKE 1 TABLET (750 MG TOTAL) BY MOUTH 4 (FOUR) TIMES A DAY AS NEEDED FOR MUSCLE SPASMS     Patient not taking: Reported on 9/19/2022   naproxen (Naprosyn) 500 mg tablet Not Taking at Unknown time  No No   Sig: Take 1 tablet (500 mg total) by mouth 2 (two) times a day with meals   Patient not taking: Reported on 9/19/2022   omeprazole (PriLOSEC) 40 MG capsule More than a month at Unknown time  Yes No   Sig: Take 1 capsule by mouth daily   phentermine 15 MG capsule Not Taking at Unknown time  Yes No   Sig: every 24 hours   Patient not taking: Reported on 9/19/2022      Facility-Administered Medications: None     Social History:     Social History     Socioeconomic History    Marital status: Single     Spouse name: Not on file    Number of children: Not on file    Years of education: Not on file   Javed Schuler Highest education level: Not on file   Occupational History    Not on file   Tobacco Use    Smoking status: Former Smoker     Packs/day: 0 25     Types: Cigarettes    Smokeless tobacco: Never Used   Vaping Use    Vaping Use: Never used   Substance and Sexual Activity    Alcohol use: No    Drug use: No    Sexual activity: Never   Other Topics Concern    Not on file   Social History Narrative    Does not consume caffeine     Social Determinants of Health     Financial Resource Strain: Not on file   Food Insecurity: Not on file   Transportation Needs: Not on file   Physical Activity: Not on file   Stress: Not on file   Social Connections: Not on file   Intimate Partner Violence: Not on file   Housing Stability: Not on file     Patient Pre-hospital Level of Mobility: fully mobile   Patient Pre-hospital Diet Restrictions: none    Family History:  History reviewed  No pertinent family history  Physical Exam:   Vitals:   Blood Pressure: 117/80 (09/19/22 0249)  Pulse: 63 (09/19/22 0249)  Temperature: 98 °F (36 7 °C) (09/19/22 0249)  Temp Source: Temporal (09/19/22 0249)  Respirations: 16 (09/19/22 0249)  Height: 5' 5" (165 1 cm) (09/19/22 0249)  Weight - Scale: 124 kg (272 lb 4 3 oz) (09/19/22 0249)  SpO2: 99 % (09/19/22 0249)    Physical Exam  Constitutional:       Appearance: Normal appearance  HENT:      Head: Normocephalic and atraumatic  Cardiovascular:      Rate and Rhythm: Normal rate and regular rhythm  Pulses: Normal pulses  Heart sounds: Normal heart sounds  No murmur heard  No friction rub  No gallop  Pulmonary:      Effort: Pulmonary effort is normal  No respiratory distress  Breath sounds: Normal breath sounds  No wheezing, rhonchi or rales  Comments: -Decreased breath sounds right lower lobe  Musculoskeletal:         General: Tenderness present  No swelling or deformity  Normal range of motion  Cervical back: Normal range of motion  Right lower leg: No edema  Left lower leg: No edema  Comments: -pain to palpation of right lower leg    Skin:     General: Skin is warm  Comments: -Varicosities right lower leg> left lower leg, bruise noted above Right medial knee   Neurological:      General: No focal deficit present  Mental Status: She is alert and oriented to person, place, and time  Psychiatric:         Mood and Affect: Mood normal          Behavior: Behavior normal          Thought Content: Thought content normal          Judgment: Judgment normal          Lab Results: I have personally reviewed pertinent reports  Results from last 7 days   Lab Units 09/18/22  2333   WBC Thousand/uL 9 86   HEMOGLOBIN g/dL 13 2   HEMATOCRIT % 44 7   PLATELETS Thousands/uL 325   NEUTROS PCT % 54   LYMPHS PCT % 36   MONOS PCT % 7   EOS PCT % 2     Results from last 7 days   Lab Units 09/18/22  2333   POTASSIUM mmol/L 4 3   CHLORIDE mmol/L 102   CO2 mmol/L 30   BUN mg/dL 16   CREATININE mg/dL 1 01   CALCIUM mg/dL 9 1   ALK PHOS U/L 89   ALT U/L 24   AST U/L 23   EGFR ml/min/1 73sq m 67   MAGNESIUM mg/dL 2 0     Results from last 7 days   Lab Units 09/18/22  2333   INR  0 99                            Invalid input(s): URIBILINOGEN          Imaging: I have personally reviewed pertinent reports  CTA ed chest pe study    Result Date: 9/19/2022  Narrative: CTA - CHEST WITH IV CONTRAST - PULMONARY ANGIOGRAM INDICATION:   CP, SOB, hx PE  COMPARISON: September 6, 2022 TECHNIQUE: CTA examination of the chest was performed using angiographic technique according to a protocol specifically tailored to evaluate for pulmonary embolism  Axial, sagittal, and coronal 2D reformatted images were created from the source data and  submitted for interpretation  In addition, coronal 3D MIP postprocessing was performed on the acquisition scanner  Radiation dose length product (DLP) for this visit:  786 mGy-cm     This examination, like all CT scans performed in the [facility], was performed utilizing techniques to minimize radiation dose exposure, including the use of iterative reconstruction and automated exposure control  IV Contrast:  85 mL of iohexol (OMNIPAQUE)  FINDINGS: PULMONARY ARTERIAL TREE:  Decreased pulmonary artery embolus in the subsegmental branch of the right lower lobe LUNGS:  Lungs are clear  There is no tracheal or endobronchial lesion  PLEURA:  Unremarkable  HEART/GREAT VESSELS:  Unremarkable for patient's age  No thoracic aortic aneurysm  MEDIASTINUM AND IDA:  Unremarkable  CHEST WALL AND LOWER NECK:   Unremarkable  VISUALIZED STRUCTURES IN THE UPPER ABDOMEN:  Visualized hepatic parenchyma is diffusely decreased in density consistent with hepatic steatosis  Otherwise no clinical significant abnormality identified in the visualized upper abdomen  OSSEOUS STRUCTURES:  No acute fracture or destructive osseous lesion  Impression: Interval decrease in pulmonary artery embolus with subsegmental branch of the right lower lobe  The study was marked in [facility] for immediate notification  Workstation performed: [id number]     CTA ED chest PE study    Result Date: 9/6/2022  Narrative: CTA - CHEST WITH IV CONTRAST - PULMONARY ANGIOGRAM INDICATION:   Pulmonary embolism (PE) suspected, high prob chest pain, dyspnea, probably covid 19  hx DVT/PE with IVC filter, out of anticoagulation  Patient has suspected COVID-19  COMPARISON: July 9, 2022 TECHNIQUE: CTA examination of the chest was performed using angiographic technique according to a protocol specifically tailored to evaluate for pulmonary embolism  Axial, sagittal, and coronal 2D reformatted images were created from the source data and  submitted for interpretation  In addition, coronal 3D MIP postprocessing was performed on the acquisition scanner  Radiation dose length product (DLP) for this visit:  424 mGy-cm     This examination, like all CT scans performed in the [facility], was performed utilizing techniques to minimize radiation dose exposure, including the use of iterative reconstruction and automated exposure control  IV Contrast:  80 mL of iohexol (OMNIPAQUE)  FINDINGS: PULMONARY ARTERIAL TREE:  No central pulmonary embolus  Small pulmonary embolus in a peripheral 5th order branch artery supplying the basilar right lower lobe image 5/101  LUNGS:  Lungs are clear  There is no tracheal or endobronchial lesion  PLEURA:  Unremarkable  HEART/GREAT VESSELS:  Unremarkable for patient's age  No thoracic aortic aneurysm  Coronary atherosclerosis  MEDIASTINUM AND IDA:  Unremarkable  CHEST WALL AND LOWER NECK:   Unremarkable  VISUALIZED STRUCTURES IN THE UPPER ABDOMEN:  Mild fatty infiltration of liver  Mild adrenal hypertrophy  OSSEOUS STRUCTURES:  No acute fracture or destructive osseous lesion  Impression: Small pulmonary embolus in a peripheral 5th order branch artery supplying the basilar RIGHT lower lobe image 5/101  The calculated ratio of right ventricular to left ventricular diameter (RV/LV ratio) is 1 3  This is greater than 0 9, which is abnormal and indicates right heart strain  An abnormal RV/LV ratio has been shown to be associated with an increased risk of 30 day mortality in the setting of acute pulmonary embolism  Urgent consultation with the medical critical care team is recommended    I personally informed Sebastián Groveoter on 9/6/2022 via Tiger text with confirmation at 8:51 PM  Workstation performed: [id number]       EKG, Pathology, and Other Studies Reviewed on Admission:   EKG  Result Date: 09/19/22  Impression:  Normal sinus rhythm    Entire H&P was discussed with Dr Laura Montiel who agreed to what is noted above    Yumiko Liu MD  09/19/22  3:07 AM

## 2022-09-19 NOTE — ED PROVIDER NOTES
History  Chief Complaint   Patient presents with    Shortness of Breath     Was seen last week for blood clots in legs, signed out AMA  Now with SOB, dizziness, chest pain   Chest Pain       Stabbing chest pain that comes and goes for 2 days     Patient with a PMH of asthma, PE, DVT presents for an evaluation of chest pain, shortness of breath ongoing for the past several weeks worsening over the past 2 days  Patient was seen and evaluated here on 09/06 for similar symptoms - found to have a PE at that time  She subsequently left AMA  She has not taken any of her anticoagulants for at least 2 months because she does not have insurance  Also has R calf pain  Denies any numbness, tingling, weakness, abdominal pain, nausea, vomiting  No hemoptysis  No other complaints  Prior to Admission Medications   Prescriptions Last Dose Informant Patient Reported? Taking? HYDROcodone-Acetaminophen (VICODIN PO) Not Taking at Unknown time  Yes No   Sig: every 4 (four) hours   Patient not taking: No sig reported   Methocarbamol (ROBAXIN-750 PO) More than a month at Unknown time  Yes No   Sig: Robaxin-750   q 6hr   Patient not taking: Reported on 9/19/2022   Multiple Vitamins-Minerals (ONE DAILY WOMENS PO) Not Taking at Unknown time  Yes No   Sig: Take 1 tablet by mouth daily   Patient not taking: Reported on 9/19/2022   apixaban (ELIQUIS) 5 mg More than a month at Unknown time  Yes No   Sig: Take 5 mg by mouth 2 (two) times a day     Patient not taking: No sig reported   apixaban (ELIQUIS) 5 mg Not Taking at Unknown time  No No   Sig: Take 1 tablet (5 mg total) by mouth 2 (two) times a day   Patient not taking: No sig reported   atorvastatin (LIPITOR) 20 mg tablet More than a month at Unknown time  Yes No   Sig: Take 1 tablet by mouth   Patient not taking: No sig reported   buPROPion (WELLBUTRIN SR) 150 mg 12 hr tablet More than a month at Unknown time  Yes No   Sig: Take by mouth   Patient not taking: No sig reported fluocinonide (LIDEX) 0 05 % ointment Not Taking at Unknown time  Yes No   Sig: Apply topically   Patient not taking: No sig reported   furosemide (LASIX) 20 mg tablet Not Taking at Unknown time  Yes No   Sig: Take 1 tablet by mouth daily   Patient not taking: No sig reported   methadone (METHADOSE) 40 MG disintegrating tablet Not Taking at Unknown time  Yes No   Sig: Take 80 mg by mouth daily   Patient not taking: No sig reported   methocarbamol (ROBAXIN) 750 mg tablet Not Taking at Unknown time  Yes No   Sig: methocarbamol 750 mg tablet   TAKE 1 TABLET (750 MG TOTAL) BY MOUTH 4 (FOUR) TIMES A DAY AS NEEDED FOR MUSCLE SPASMS  Patient not taking: Reported on 2022   naproxen (Naprosyn) 500 mg tablet Not Taking at Unknown time  No No   Sig: Take 1 tablet (500 mg total) by mouth 2 (two) times a day with meals   Patient not taking: Reported on 2022   omeprazole (PriLOSEC) 40 MG capsule More than a month at Unknown time  Yes No   Sig: Take 1 capsule by mouth daily   phentermine 15 MG capsule Not Taking at Unknown time  Yes No   Sig: every 24 hours   Patient not taking: Reported on 2022      Facility-Administered Medications: None       Past Medical History:   Diagnosis Date    Asthma     Hx of blood clots     Psoriasis     Spinal stenosis        Past Surgical History:   Procedure Laterality Date    APPENDECTOMY      BACK SURGERY       SECTION      x 3    HYSTERECTOMY      Radical       History reviewed  No pertinent family history  I have reviewed and agree with the history as documented      E-Cigarette/Vaping    E-Cigarette Use Never User      E-Cigarette/Vaping Substances     Social History     Tobacco Use    Smoking status: Former Smoker     Packs/day: 0 25     Types: Cigarettes    Smokeless tobacco: Never Used   Vaping Use    Vaping Use: Never used   Substance Use Topics    Alcohol use: No    Drug use: No       Review of Systems   Constitutional: Negative for chills and fever    HENT: Negative for congestion, ear pain and sore throat  Eyes: Negative for pain  Respiratory: Positive for shortness of breath  Negative for cough  Cardiovascular: Positive for chest pain  Gastrointestinal: Negative for abdominal pain, nausea and vomiting  Genitourinary: Negative for dysuria  Musculoskeletal: Negative for back pain  Skin: Negative for rash  Neurological: Negative for dizziness and numbness  Psychiatric/Behavioral: Negative for suicidal ideas  All other systems reviewed and are negative  Physical Exam  Physical Exam  Vitals reviewed  Constitutional:       Appearance: She is well-developed  She is obese  HENT:      Head: Normocephalic and atraumatic  Right Ear: External ear normal       Left Ear: External ear normal       Nose: Nose normal    Cardiovascular:      Rate and Rhythm: Normal rate and regular rhythm  Heart sounds: Normal heart sounds  Pulmonary:      Effort: Pulmonary effort is normal       Breath sounds: Examination of the right-lower field reveals wheezing  Examination of the left-lower field reveals wheezing  Wheezing present  Comments: Mild wheezes in lower lung fields  Abdominal:      General: Bowel sounds are normal       Palpations: Abdomen is soft  Tenderness: There is no abdominal tenderness  Musculoskeletal:         General: Normal range of motion  Cervical back: Normal range of motion and neck supple  Legs:    Skin:     General: Skin is warm and dry  Capillary Refill: Capillary refill takes less than 2 seconds  Neurological:      Mental Status: She is alert and oriented to person, place, and time     Psychiatric:         Behavior: Behavior normal          Vital Signs  ED Triage Vitals [09/18/22 2310]   Temperature Pulse Respirations Blood Pressure SpO2   98 3 °F (36 8 °C) 84 18 123/75 99 %      Temp Source Heart Rate Source Patient Position - Orthostatic VS BP Location FiO2 (%)   Tympanic Monitor Sitting Left arm --      Pain Score       7           Vitals:    09/18/22 2310 09/19/22 0030 09/19/22 0100   BP: 123/75 122/70 105/61   Pulse: 84 70 62   Patient Position - Orthostatic VS: Sitting  Sitting         Visual Acuity      ED Medications  Medications   sodium chloride 0 9 % bolus 1,000 mL (1,000 mL Intravenous Not Given 9/19/22 0101)   enoxaparin (LOVENOX) subcutaneous injection 120 mg (120 mg Subcutaneous Not Given 9/19/22 0104)   iohexol (OMNIPAQUE) 350 MG/ML injection (SINGLE-DOSE) 85 mL (85 mL Intravenous Given 9/19/22 0016)   enoxaparin (LOVENOX) 60 mg/0 6 mL subcutaneous injection **ADS Override Pull** (120 mg  Given 9/19/22 0100)       Diagnostic Studies  Results Reviewed     Procedure Component Value Units Date/Time    HS Troponin I 4hr [[id number]]     Lab Status: No result Specimen: Blood     Magnesium [[id number]]  (Normal) Collected: 09/18/22 2333    Lab Status: Final result Specimen: Blood from Arm, Right Updated: 09/19/22 0004     Magnesium 2 0 mg/dL     Comprehensive metabolic panel [[id number]] Collected: 09/18/22 2333    Lab Status: Final result Specimen: Blood from Arm, Right Updated: 09/19/22 0004     Sodium 139 mmol/L      Potassium 4 3 mmol/L      Chloride 102 mmol/L      CO2 30 mmol/L      ANION GAP 7 mmol/L      BUN 16 mg/dL      Creatinine 1 01 mg/dL      Glucose 98 mg/dL      Calcium 9 1 mg/dL      AST 23 U/L      ALT 24 U/L      Alkaline Phosphatase 89 U/L      Total Protein 7 7 g/dL      Albumin 4 0 g/dL      Total Bilirubin 0 33 mg/dL      eGFR 67 ml/min/1 73sq m     Narrative:      [place] guidelines for Chronic Kidney Disease (CKD):     Stage 1 with normal or high GFR (GFR > 90 mL/min/1 73 square meters)    Stage 2 Mild CKD (GFR = 60-89 mL/min/1 73 square meters)    Stage 3A Moderate CKD (GFR = 45-59 mL/min/1 73 square meters)    Stage 3B Moderate CKD (GFR = 30-44 mL/min/1 73 square meters)    Stage 4 Severe CKD (GFR = 15-29 mL/min/1 73 square meters)    Stage 5 End Stage CKD (GFR <15 mL/min/1 73 square meters)  Note: GFR calculation is accurate only with a steady state creatinine    HS Troponin 0hr (reflex protocol) [[id number]]  (Normal) Collected: 09/18/22 2333    Lab Status: Final result Specimen: Blood from Arm, Right Updated: 09/19/22 0002     hs TnI 0hr <2 ng/L     HS Troponin I 2hr [[id number]]     Lab Status: No result Specimen: Blood     APTT [[id number]]  (Normal) Collected: 09/18/22 2333    Lab Status: Final result Specimen: Blood from Arm, Right Updated: 09/18/22 2351     PTT 27 seconds     Protime-INR [[id number]]  (Normal) Collected: 09/18/22 2333    Lab Status: Final result Specimen: Blood from Arm, Right Updated: 09/18/22 2351     Protime 13 4 seconds      INR 0 99    CBC and differential [[id number]]  (Abnormal) Collected: 09/18/22 2333    Lab Status: Final result Specimen: Blood from Arm, Right Updated: 09/18/22 2338     WBC 9 86 Thousand/uL      RBC 5 40 Million/uL      Hemoglobin 13 2 g/dL      Hematocrit 44 7 %      MCV 83 fL      MCH 24 4 pg      MCHC 29 5 g/dL      RDW 16 2 %      MPV 8 6 fL      Platelets 166 Thousands/uL      nRBC 0 /100 WBCs      Neutrophils Relative 54 %      Immat GRANS % 0 %      Lymphocytes Relative 36 %      Monocytes Relative 7 %      Eosinophils Relative 2 %      Basophils Relative 1 %      Neutrophils Absolute 5 40 Thousands/µL      Immature Grans Absolute 0 03 Thousand/uL      Lymphocytes Absolute 3 54 Thousands/µL      Monocytes Absolute 0 65 Thousand/µL      Eosinophils Absolute 0 18 Thousand/µL      Basophils Absolute 0 06 Thousands/µL     UA w Reflex to Microscopic w Reflex to Culture [[id number]]     Lab Status: No result Specimen: Urine, Clean Catch     POCT pregnancy, urine [[id number]]     Lab Status: No result                  CTA ed chest pe study   Final Result by Sonja Cook DO (09/19 5089)      Interval decrease in pulmonary artery embolus with subsegmental branch of the right lower lobe  The study was marked in [facility] for immediate notification  Workstation performed: [id number]                    Procedures  Procedures         ED Course  ED Course as of 09/19/22 014Kelly Saeed Sep 18, 2022   2357 Procedure Note: EKG  Date/Time: 09/18/22 11:57 PM   Performed by: Collette Henry  Authorized by: Collette Henry  ECG interpreted by me, the ED Provider: yes   The EKG demonstrates:  Rate 74  Rhythm sinus  QTc 432  No ST elevations/depressions                                       Wells' Criteria for PE    Flowsheet Row Most Recent Value   Wells' Criteria for PE    Clinical signs and symptoms of DVT 0 Filed at: 09/18/2022 2316   PE is primary diagnosis or equally likely 3 Filed at: 09/18/2022 2316   HR >100 0 Filed at: 09/18/2022 2316   Immobilization at least 3 days or Surgery in the previous 4 weeks 0 Filed at: 09/18/2022 2316   Previous, objectively diagnosed PE or DVT 1 5 Filed at: 09/18/2022 2316   Hemoptysis 0 Filed at: 09/18/2022 2316   Malignancy with treatment within 6 months or palliative 0 Filed at: 09/18/2022 2316   Destinee Gomez' Criteria Total 4 5 Filed at: 09/18/2022 2316                MDM  Number of Diagnoses or Management Options  Calf pain  Pulmonary embolism ([address] )  Diagnosis management comments: R lower lobe subsegmental PE noted on CTA  Given dose of Lovenox in ED  Labs otherwise unremarkable  Will admit   FM, Dr John Kingsley accepting      Disposition  Final diagnoses:   Pulmonary embolism ([address] )   Calf pain     Time reflects when diagnosis was documented in both MDM as applicable and the Disposition within this note     Time User Action Codes Description Comment    9/19/2022  1:32 AM Gabriel Klein Creamer Add [I26 99] Pulmonary embolism ([address] )     9/19/2022  1:32 AM Klein Rios Creamer Add [B50 578] Calf pain       ED Disposition     ED Disposition   Admit    Condition   Stable    Date/Time   Mon Sep 19, 2022  1:32 AM    Comment   Case was discussed with FM and the patient's admission status was agreed to be Admission Status: inpatient status to the service of Dr John Kingsley   Follow-up Information    None         Patient's Medications   Discharge Prescriptions    No medications on file       No discharge procedures on file      PDMP Review     None          ED Provider  Electronically Signed by           Seven Salinas PA-C  09/19/22 0149

## 2022-09-19 NOTE — ASSESSMENT & PLAN NOTE
Patient has been on Methadone for the past 8 years  UDS positive for Methadone and THC  Patient follows with a Methadone clinic in [place], [place]      - Continue following with Methadone clinic

## 2022-09-19 NOTE — ASSESSMENT & PLAN NOTE
Patient has lost approximately 30 pounds unintentionally in the last six months  301 six months ago to approximately 272 lbs on admission  CT chest/abdomen/pelvis in 7/2022 showed no evidence of lung consolidation or pulmonary embolism, hepatomegaly, and surgical changes of prior hysterectomy  Left adnexal/ovarian cystic structure, correlate with nonemergent outpatient pelvic ultrasound  CBC unremarkable  Patient has smoked 1/2 pack per day since age 4 years and reports she quit approximately 2 years ago  No nodules noted  Patient's last pap smear was in 2014 and was negative for malignancy  - Follow up HIV, acute hepatitis panel, and Tb   - Spoke to patient and recommended she get a mammogram and colonoscopy outpatient  - Advised patient that she is overdue for her pap smear    - Continue routine follow up with PCP

## 2022-09-19 NOTE — PHYSICAL THERAPY NOTE
Physical Therapy Evaluation    Patient's Name: Landen Parnell    Admitting Diagnosis  Shortness of breath [R06 02]  Chest pain [R07 9]  Pulmonary embolism ([facility] [address] ) [I26 99]  Calf pain [M79 669]    Problem List  Patient Active Problem List   Diagnosis    DVT of lower extremity, bilateral (HCC)    Asthma    Cough    URI (upper respiratory infection)    Mass on back    Hx of smoking    Psoriasis    Hx pulmonary embolism    Low mean corpuscular volume (MCV)    Presence of IVC filter    Acute pulmonary embolism (HCC)    Weight loss, unintentional    Hx of substance abuse ([facility] [address] )       Past Medical History  Past Medical History:   Diagnosis Date    Asthma     Hx of blood clots     Psoriasis     Spinal stenosis        Past Surgical History  Past Surgical History:   Procedure Laterality Date    APPENDECTOMY      BACK SURGERY       SECTION      x 3    HYSTERECTOMY  2015    Radical       Recent Imaging  CTA ed chest pe study   Final Result by Ismael Brown DO ( 96)      Interval decrease in pulmonary artery embolus with subsegmental branch of the right lower lobe  The study was marked in [facility] for immediate notification  Workstation performed: [id number]             Recent Vital Signs  Vitals:    22 0100 22 0249 22 0600 22 0749   BP: 105/61 117/80  107/74   BP Location: Left arm Left arm  Left arm   Pulse: 62 63  64   Resp: 14 16  18   Temp:  98 °F (36 7 °C)  (!) 97 2 °F (36 2 °C)   TempSrc:  Temporal  Temporal   SpO2: 97% 99%  95%   Weight:  124 kg (272 lb 4 3 oz) 123 kg (272 lb 0 8 oz)    Height:  5' 5" (1 651 m)          22 1140   PT Last Visit   PT Visit Date 22   Note Type   Note type Evaluation   Pain Assessment   Pain Assessment Tool 0-10   Pain Score 5   Pain Location/Orientation Location: Head   Home Living   Home Layout Stairs to enter with rails; Two level   Bathroom Accessibility Accessible   Prior Function   Level of [place] Independent with Report given to Miesha HUDDLESTON.  She will assume care at this time.   ADLs and functional mobility   Lives With Spouse; Family   Receives Help From Family   ADL Assistance Independent   IADLs Independent   Falls in the last 6 months 1 to 4   Vocational Full time employment   General   Family/Caregiver Present No   Cognition   Overall Cognitive Status WFL   Arousal/Participation Alert   Orientation Level Oriented X4   Memory Within functional limits   Following Commands Follows all commands and directions without difficulty   RLE Assessment   RLE Assessment WFL  (mild weakness due to deconditioning)   LLE Assessment   LLE Assessment WFL  (mild weakness due to deconditioning)   Coordination   Movements are Fluid and Coordinated 1   Sensation WFL   Light Touch   RLE Light Touch Grossly intact   LLE Light Touch Grossly intact   Bed Mobility   Supine to Sit 7  Independent   Additional items Increased time required   Sit to Supine 7  Independent   Additional items Increased time required   Transfers   Sit to Stand 7  Independent   Additional items Increased time required   Stand to Sit 7  Independent   Additional items Increased time required   Ambulation/Elevation   Gait pattern Step through pattern;Decreased toe off;Decreased heel strike; Short stride; Excessively slow;Decreased foot clearance   Gait Assistance 7  Independent   Additional items Verbal cues   Assistive Device None   Distance 50ft   Balance   Static Sitting Fair +   Dynamic Sitting Fair +   Static Standing Fair   Dynamic Standing Fair   Ambulatory Fair -   Endurance Deficit   Endurance Deficit Yes   Endurance Deficit Description decreased from baseline   Activity Tolerance   Activity Tolerance Patient limited by fatigue   Medical Staff Made Aware spoke to CM   Nurse Made Aware spoke to RN   Assessment   Prognosis Good   Problem List Decreased strength;Decreased endurance; Impaired balance;Decreased mobility;Pain   Barriers to Discharge Inaccessible home environment   Goals   Patient Goals to go home   Recommendation   PT Discharge Recommendation No rehabilitation needs   AM-PAC Basic Mobility Inpatient   Turning in Bed Without Bedrails 4   Lying on Back to Sitting on Edge of Flat Bed 4   Moving Bed to Chair 4   Standing Up From Chair 4   Walk in Room 4   Climb 3-5 Stairs 3   Basic Mobility Inpatient Raw Score 23   Basic Mobility Standardized Score 50 88   Highest Level Of Mobility   JH-HLM Goal 7: Walk 25 feet or more   JH-HLM Achieved 7: Walk 25 feet or more   End of Consult   Patient Position at End of Consult Supine; All needs within reach         ASSESSMENT                                                                                                                     Michael Burgos is a 38 y o  female admitted to [facility] on 9/18/2022 for Acute pulmonary embolism ([address] )  Pt  has a past medical history of Asthma, blood clots, Psoriasis, and Spinal stenosis    PT was consulted and pt was seen on 9/19/2022 for mobility assessment and d/c planning  Pt presents supine in bed alert and agreeable to therapy  Impairments limiting pt at this time include impaired balance, decreased endurance, new onset of impairment of functional mobility, pain, decreased activity tolerance, and decreased strength  Pt is currently functioning at a independent level for bed mobility, independent level for transfers, independent level for ambulation with no assistive device  The patient's AM-PAC Basic Mobility Inpatient Short Form Raw Score is 23  A Raw score of greater than 16 suggests the patient may benefit from discharge to home  Please also refer to the recommendation of the Physical Therapist for safe discharge planning      Recommendations                                                                                                                  DME: None    Discharge Disposition:  Home with no needs      Max Mckinnon PT, DPT

## 2022-09-19 NOTE — ASSESSMENT & PLAN NOTE
Patient presenting with SOB x 1 month  History of multiple, recurrent DVTs/PEs starting back in 2013  CTA PE 9/19/2022: interval decrease in pulmonary artery embolus with subsegmental branch of the right lower lobe (compared to CT-PE on 9/6/2022, which showed a larger PE in the right lobe along with right heart strain, which has now resolved)  PT/INR/PTT all within normal limits  IVC placement in 2014 at [facility]  Home Medication: Lovenox  However, has not taken this for many months due to insurance issues  Anti-coagulated with Lovenox 1 mg/kg BID during admission (120 mg BID- received 2 doses) with improvement in symptoms  Has had hypercoaguable work-up in the past, which was negative  Fran Portillo regarding patient; he stated patient can be discharged home today as she is stable and clot is small  Recommended Xarelto- would like to follow up with her in 1-2 months  Appreciate recommendations  Patient denies SOB, vitals are stable, and she is saturating at 99% on room air at time of discharge  - Will discharge home on Xarelto- patient counseled on how to use this medication: 15 mg/day for 21 days, then increase to 20 mg/day and remain on this dose  This was ordered to the pharmacy and social work was made aware she required meds to beds  - Ambulatory referral to Pulmonology placed; they would like to see patient in 1-2 months  Patient made aware

## 2022-09-19 NOTE — DISCHARGE SUMMARY
Discharge Summary - Saulo Kuhn    Patient Information: Roberto Maya 38 y o  female MRN: [id number]  Unit/Bed#: 7T [facility] 718-01 Encounter: [id number]    Discharging Physician / Practitioner: Dr Kamari Gomez   PCP: Beverly Hairston MD  Admission Date:   Admission Orders (From admission, onward)     Ordered        09/19/22 0134  INPATIENT ADMISSION  Once                      Discharge Date: 9/19/2022    Reason for Admission: Acute Pulmonary Embolism     Discharge Diagnoses:     Principal Problem:    Acute pulmonary embolism ([facility] )  Active Problems:    Psoriasis    Weight loss, unintentional    Hx of substance abuse ([facility] )  Resolved Problems:    * No resolved hospital problems  *        Hx of substance abuse [facility])  Assessment & Plan  Patient has been on Methadone for the past 8 years  UDS positive for Methadone and THC  Patient follows with a Methadone clinic in [place], [place]      - Continue following with Methadone clinic  Weight loss, unintentional  Assessment & Plan  Patient has lost approximately 30 pounds unintentionally in the last six months  301 six months ago to approximately 272 lbs on admission  CT chest/abdomen/pelvis in 7/2022 showed no evidence of lung consolidation or pulmonary embolism, hepatomegaly, and surgical changes of prior hysterectomy  Left adnexal/ovarian cystic structure, correlate with nonemergent outpatient pelvic ultrasound  CBC unremarkable  Patient has smoked 1/2 pack per day since age 4 years and reports she quit approximately 2 years ago  No nodules noted  Patient's last pap smear was in 2014 and was negative for malignancy  - Follow up HIV, acute hepatitis panel, and Tb   - Spoke to patient and recommended she get a mammogram and colonoscopy outpatient  - Advised patient that she is overdue for her pap smear    - Continue routine follow up with PCP        Psoriasis  Assessment & Plan  Home Medication: Lidex 0 05% ointment    - Continue home Lidex      * Acute pulmonary embolism [facility])  Assessment & Plan  Patient presenting with SOB x 1 month  History of multiple, recurrent DVTs/PEs starting back in 2013  CTA PE 9/19/2022: interval decrease in pulmonary artery embolus with subsegmental branch of the right lower lobe (compared to CT-PE on 9/6/2022, which showed a larger PE in the right lobe along with right heart strain, which has now resolved)  PT/INR/PTT all within normal limits  IVC placement in 2014 at [facility]  Home Medication: Lovenox  However, has not taken this for many months due to insurance issues  Anti-coagulated with Lovenox 1 mg/kg BID during admission (120 mg BID- received 2 doses) with improvement in symptoms  Has had hypercoaguable work-up in the past, which was negative  Gab Shields regarding patient; he stated patient can be discharged home today as she is stable and clot is small  Recommended Xarelto- would like to follow up with her in 1-2 months  Appreciate recommendations  Patient denies SOB, vitals are stable, and she is saturating at 99% on room air at time of discharge  - Will discharge home on Xarelto- patient counseled on how to use this medication: 15 mg/day for 21 days, then increase to 20 mg/day and remain on this dose  This was ordered to the pharmacy and social work was made aware she required meds to beds  - Ambulatory referral to Pulmonology placed; they would like to see patient in 1-2 months  Patient made aware  Consultations During Hospital Stay:  · Pulmonology     Procedures Performed:   · None     Significant Findings / Test Results:   · CMP, CBC wnl, Troponin negative, Mg 2 0 (N),  APTT 27, INR 0 99  · CTA PE 9/6/22: Small pulmonary embolus in a peripheral 5th order branch artery supplying the basilar RIGHT lower lobe image 5/101  The calculated ratio of right ventricular to left ventricular diameter (RV/LV ratio) is 1 3   This is greater than 0 9, which is abnormal and indicates right heart strain  An abnormal RV/LV ratio has been shown to be associated with an increased risk of 30 day mortality in the setting of acute pulmonary embolism  · CTA PE 9/19/22: Decreased pulmonary artery embolus in the subsegmental branch of the right lower lobe  Lungs are clear, no tracheal or endobronchial lesion  Visualized hepatic parenchyma is diffusely decreased in density consistent with hepatic steatosis  Otherwise no clinical significant abnormality identified in the visualized upper abdomen  · UDS: Methadone +, TCH +     Incidental Findings:   · None      Test Results Pending at Discharge (will require follow up):   · HIV, Hepatitis panel, Quantiferon TB Gold     Outpatient Tests Requested:  · None     Outpatient follow-up Requested:   PCP and Pulmonology     Complications:  None     Hospital Course:     Shen Becker is a 38 y o  female patient with a PMH of PE, IVC placement (2014), spinal surgery (2021, due to spinal stenosis) and IVDU who originally presented to the hospital on 9/18/2022 due to inability to walk, dizziness and SOB and chest pain  Her SOB and chest pain (7/10)  has been present for the past month and tends to get worse with physical activity (one flight of stairs)  She also mentioned she has had associated right calf pain with SOB which has significantly worsened yesterday  Patient complained of a headache as well, but mentioned she had 'knocked her forehead'  She has a history of DVT and PE first diagnosed in November 2013, with hypercoagulable workup which was found to be negative at the time  Since then, patient has had multiple episodes of blood clots with similar presentations to the current one and has been on Coumadin, Xarelto and Eliquis in the past  Most recently she was on Lovenox, which she reported she stopped taking a few months ago due to insurance issues     Patient was recently seen in the ED (9/6/22) with what she believed were COVID symptoms and was found to have an acute PE in the right basilar lung with right heart strain  At the time, it was noted that the patient was not taking her prescribed medication and she left the ED AMA  Of note, patient was an IVDU (last used drugs 8 years ago) and is currently on Methadone  ED Course:   1  VS on arrival: Temp 98 3, Pulse 84, Sat 99% on RA, Resp 18, /75  2  Labs: CBC, CMP, Mg, Troponin, APTT, INR - wnl  3  CTA PE:  Decreased pulmonary artery embolus in the subsegmental branch of the right lower lobe  Lungs are clear, no tracheal or endobronchial lesion  Visualized hepatic parenchyma is diffusely decreased in density consistent with hepatic steatosis  Otherwise no clinical significant abnormality identified in the visualized upper abdomen  4  EKG: Normal sinus rhythm, no acute ST/T wave abnormalities, normal intervals  5  Medications: Normal Saline IV 1 L bolus, Lovenox 120 mg IV     Aparna Grande was admitted to the [facility] for acute pulmonary embolism management with IV fluids and Lovenox  On the day of discharge, the patient's symptoms have significantly improved  She denies SOB, chest pain, however did complain of mild right calf pain still present (chronic problem for patient), but improved since the admission  She denies nausea/vomiting or urinary symptoms  We spoke to Pulmonology, who counseled that patient can be discharged as she is stable and follow up with them outpatient  She will be discharged with Xarelto and was advised to follow up with PCP and Pulmonology outpatient          Condition at Discharge: stable     Discharge Day Visit / Exam:     Vitals: Blood Pressure: 107/74 (09/19/22 0749)  Pulse: 64 (09/19/22 0749)  Temperature: (!) 97 2 °F (36 2 °C) (09/19/22 0749)  Temp Source: Temporal (09/19/22 0749)  Respirations: 18 (09/19/22 0749)  Height: 5' 5" (165 1 cm) (09/19/22 0249)  Weight - Scale: 123 kg (272 lb 0 8 oz) (09/19/22 0600)  SpO2: 95 % (09/19/22 5381)    Exam:   Physical Exam  Vitals and nursing note reviewed  Constitutional:       General: She is not in acute distress  Appearance: Normal appearance  She is not toxic-appearing  Comments: In no acute distress, SpO2 95% on RA  HENT:      Head: Normocephalic and atraumatic  Right Ear: Tympanic membrane normal       Left Ear: Tympanic membrane normal       Nose: Nose normal  No congestion or rhinorrhea  Mouth/Throat:      Mouth: Mucous membranes are moist       Pharynx: Oropharynx is clear  Eyes:      Extraocular Movements: Extraocular movements intact  Conjunctiva/sclera: Conjunctivae normal    Neck:      Vascular: No carotid bruit  Cardiovascular:      Rate and Rhythm: Normal rate and regular rhythm  Pulses: Normal pulses  Dorsalis pedis pulses are 2+ on the right side and 2+ on the left side  Heart sounds: Normal heart sounds  No murmur heard  No gallop  Comments: Right lower extremity - Positive Nura's sign   Pulmonary:      Effort: Pulmonary effort is normal  No respiratory distress  Breath sounds: Normal breath sounds  No stridor  No wheezing, rhonchi or rales  Chest:      Chest wall: No tenderness  Abdominal:      General: Abdomen is flat  Bowel sounds are normal  There is no distension  Palpations: Abdomen is soft  Musculoskeletal:         General: No swelling, tenderness, deformity or signs of injury  Normal range of motion  Cervical back: Normal range of motion and neck supple  Right lower leg: No edema  Left lower leg: No edema  Lymphadenopathy:      Cervical: No cervical adenopathy  Skin:     General: Skin is warm  Findings: No erythema or rash  Neurological:      General: No focal deficit present  Mental Status: She is alert and oriented to person, place, and time         Discussion with Family: Patient       Discharge instructions/Information to patient and family:   See after visit summary for information provided to patient and family  Discharge Medications:  Rivaroxaban 15 mg Oral Daily with breakfast for 21 days,   Rivaroxaban 20 mg Oral Daily with breakfast       Provisions for Follow-Up Care:  See after visit summary for information related to follow-up care and any pertinent home health orders  Disposition:     Home    For Discharges to [facility]:   · Not Applicable to this Patient - Not Applicable to this Patient    Planned Readmission: NO     Discharge Statement:  I spent 45 minutes discharging the patient  This time was spent on the day of discharge  I had direct contact with the patient on the day of discharge  Greater than 50% of the total time was spent examining patient, answering all patient questions, arranging and discussing plan of care with patient as well as directly providing post-discharge instructions  Additional time then spent on discharge activities      Stephanie Lundberg MD  09/19/22  12:20 PM

## 2022-09-20 LAB
GAMMA INTERFERON BACKGROUND BLD IA-ACNC: 0.03 IU/ML
HIV 1+2 AB+HIV1 P24 AG SERPL QL IA: NORMAL
M TB IFN-G BLD-IMP: NEGATIVE
M TB IFN-G CD4+ BCKGRND COR BLD-ACNC: 0 IU/ML
M TB IFN-G CD4+ BCKGRND COR BLD-ACNC: 0 IU/ML
MITOGEN IGNF BCKGRD COR BLD-ACNC: >10 IU/ML

## 2022-09-20 NOTE — UTILIZATION REVIEW
Initial Clinical Review    Admission: Date/Time/Statement:   Admission Orders (From admission, onward)     Ordered        09/19/22 0134  INPATIENT ADMISSION  Once                      Orders Placed This Encounter   Procedures    INPATIENT ADMISSION     Standing Status:   Standing     Number of Occurrences:   1     Order Specific Question:   Level of Care     Answer:   Med Surg [16]     Order Specific Question:   Estimated length of stay     Answer:   More than 2 Midnights     Order Specific Question:   Certification     Answer:   I certify that inpatient services are medically necessary for this patient for a duration of greater than two midnights  See H&P and MD Progress Notes for additional information about the patient's course of treatment  ED Arrival Information     Expected   -    Arrival   9/18/2022 23:04    Acuity   Emergent            Means of arrival   Walk-In    Escorted by   Self    Service   Family Medicine    Admission type   Emergency            Arrival complaint   chest pain/discomfort  difficulty breathing           Chief Complaint   Patient presents with    Shortness of Breath     Was seen last week for blood clots in legs, signed out AMA  Now with SOB, dizziness, chest pain   Chest Pain       Stabbing chest pain that comes and goes for 2 days       Initial Presentation: 38 y o  female who presented self from home to [address] [place] [address],[address] [facility] ED  Inpatient admission for evaluation and treatment of recurrent PE  PMHx: Asthma, Blood clots (IVC placed 2015), Psoriasis, Spinal Stenosis, Substance use  Presented w/ SOB and COLEMAN  On exam, decreased breath sounds RLL, RLE pain w/ palpation, varicosities of RLE  CTA showed interval decrease pulmonary artery embolus  Plan: Lovenox, continue ointment for psoriasis  Date: 9/19/22   Day 2: Pt had been seen in ED on 9/6 and found to have acute PE at that time noted to be non-compliant w/ outpatient [facility], but left AMA  Represented on 9/18 for SOB   On exam, RLE positive Nura's sign  Case management assisted in medications and resources for patient  Plan: start Xarelto, ambulatory referral to Pulmonology, pt discharged to home w/ self-care  ED Triage Vitals [09/18/22 2310]   Temperature Pulse Respirations Blood Pressure SpO2   98 3 °F (36 8 °C) 84 18 123/75 99 %      Temp Source Heart Rate Source Patient Position - Orthostatic VS BP Location FiO2 (%)   Tympanic Monitor Sitting Left arm --      Pain Score       7          Wt Readings from Last 1 Encounters:   09/19/22 123 kg (272 lb 0 8 oz)     Additional Vital Signs:   Date/Time Temp Pulse Resp BP MAP (mmHg) SpO2 O2 Device   09/19/22 0749 97 2 °F (36 2 °C) Abnormal  64 18 107/74 -- 95 % None (Room air)   09/19/22 0249 98 °F (36 7 °C) 63 16 117/80 93 99 % None (Room air)   09/19/22 0100 -- 62 14 105/61 75 97 % None (Room air)   09/19/22 0030 -- 70 16 122/70 87 95 % --       Pertinent Labs/Diagnostic Test Results:   CTA ed chest pe study   Final Result by Hilarie Duverney, DO (09/19 0758)      Interval decrease in pulmonary artery embolus with subsegmental branch of the right lower lobe  The study was marked in [facility] for immediate notification                    Workstation performed: [id number]               Results from last 7 days   Lab Units 09/19/22  0752 09/18/22  2333   WBC Thousand/uL 8 28 9 86   HEMOGLOBIN g/dL 13 2 13 2   HEMATOCRIT % 43 3 44 7   PLATELETS Thousands/uL 318 325   NEUTROS ABS Thousands/µL 3 51 5 40         Results from last 7 days   Lab Units 09/19/22  0752 09/18/22  2333   SODIUM mmol/L 137 139   POTASSIUM mmol/L 3 7 4 3   CHLORIDE mmol/L 105 102   CO2 mmol/L 28 30   ANION GAP mmol/L 4* 7   BUN mg/dL 14 16   CREATININE mg/dL 0 74 1 01   EGFR ml/min/1 73sq m 98 67   CALCIUM mg/dL 8 7 9 1   MAGNESIUM mg/dL  --  2 0     Results from last 7 days   Lab Units 09/18/22  2333   AST U/L 23   ALT U/L 24   ALK PHOS U/L 89   TOTAL PROTEIN g/dL 7 7   ALBUMIN g/dL 4 0   TOTAL BILIRUBIN mg/dL 0 33 Results from last 7 days   Lab Units 09/19/22  0752 09/18/22  2333   GLUCOSE RANDOM mg/dL 98 98     Results from last 7 days   Lab Units 09/18/22  2333   HS TNI 0HR ng/L <2     Results from last 7 days   Lab Units 09/19/22  0752 09/18/22  2333   PROTIME seconds 13 6 13 4   INR  1 01 0 99   PTT seconds  --  27     Results from last 7 days   Lab Units 09/19/22  0752   HEP B S AG  Non-reactive   HEP C AB  Low Reactive*   HEP B C IGM  Non-reactive     Results from last 7 days   Lab Units 09/19/22  0829   AMPH/METH  Negative   BARBITURATE UR  Negative   BENZODIAZEPINE UR  Negative   COCAINE UR  Negative   METHADONE URINE  Positive*   OPIATE UR  Negative   PCP UR  Negative   THC UR  Positive*       ED Treatment:   Medication Administration from 09/18/2022 2304 to 09/19/2022 0248       Date/Time Order Dose Route Action     09/19/2022 0016 iohexol (OMNIPAQUE) 350 MG/ML injection (SINGLE-DOSE) 85 mL 85 mL Intravenous Given     09/19/2022 0100 enoxaparin (LOVENOX) 60 mg/0 6 mL subcutaneous injection 120 mg Subcutaneous Given        Past Medical History:   Diagnosis Date    Asthma     Hx of blood clots     Psoriasis     Spinal stenosis      Present on Admission:   Acute pulmonary embolism (HCC)   Weight loss, unintentional   Psoriasis   Hx of substance abuse ([facility] )      Admitting Diagnosis: Shortness of breath [R06 02]  Chest pain [R07 9]  Pulmonary embolism (HCC) [I26 99]  Calf pain [M79 669]  Age/Sex: 38 y o  female  Admission Orders:  Regular Diet  DW  SCDs  Scheduled Medications:  enoxaparin, 120 mg, Subcutaneous, Q12H [address]  flucinonide, , Transdermal, BID    Continuous IV Infusions:  none    PRN Meds:  ibuprofen, 400 mg, Oral; 9/19 x1      Network Utilization Review Department  ATTENTION: Please call with any questions or concerns to [phone] and carefully listen to the prompts so that you are directed to the right person   All voicemails are confidential   Lynette Kussmaul all requests for admission clinical reviews, approved or denied determinations and any other requests to dedicated fax number below belonging to the campus where the patient is receiving treatment   List of dedicated fax numbers for the Facilities:  [address] DENIALS (Administrative/Medical Necessity) [phone]   [address] (Maternity/NICU/Pediatrics) [phone]   [address]  [address] [address] [place] [address] [address] [place] [address] [phone]

## 2022-09-28 ENCOUNTER — TELEPHONE (OUTPATIENT)
Dept: PULMONOLOGY | Facility: CLINIC | Age: 42
End: 2022-09-28

## 2022-10-03 ENCOUNTER — OFFICE VISIT (OUTPATIENT)
Dept: OBGYN CLINIC | Facility: CLINIC | Age: 42
End: 2022-10-03

## 2022-10-03 VITALS
HEART RATE: 69 BPM | SYSTOLIC BLOOD PRESSURE: 124 MMHG | BODY MASS INDEX: 45.15 KG/M2 | WEIGHT: 271 LBS | DIASTOLIC BLOOD PRESSURE: 76 MMHG | HEIGHT: 65 IN

## 2022-10-03 DIAGNOSIS — R10.2 PELVIC PAIN: Primary | ICD-10-CM

## 2022-10-03 DIAGNOSIS — Z59.9 FINANCIAL DIFFICULTIES: ICD-10-CM

## 2022-10-03 DIAGNOSIS — Z20.2 POSSIBLE EXPOSURE TO STD: ICD-10-CM

## 2022-10-03 DIAGNOSIS — B96.89 BV (BACTERIAL VAGINOSIS): ICD-10-CM

## 2022-10-03 DIAGNOSIS — N89.8 VAGINAL ITCHING: ICD-10-CM

## 2022-10-03 DIAGNOSIS — N76.0 BV (BACTERIAL VAGINOSIS): ICD-10-CM

## 2022-10-03 LAB
BV WHIFF TEST VAG QL: POSITIVE
CLUE CELLS SPEC QL WET PREP: POSITIVE
PH SMN: 5 [PH]
SL AMB  POCT GLUCOSE, UA: NEGATIVE
SL AMB LEUKOCYTE ESTERASE,UA: NEGATIVE
SL AMB POCT BILIRUBIN,UA: NEGATIVE
SL AMB POCT BLOOD,UA: NEGATIVE
SL AMB POCT CLARITY,UA: NORMAL
SL AMB POCT COLOR,UA: NORMAL
SL AMB POCT KETONES,UA: NORMAL
SL AMB POCT NITRITE,UA: NEGATIVE
SL AMB POCT PH,UA: 6
SL AMB POCT SPECIFIC GRAVITY,UA: 1.02
SL AMB POCT URINE PROTEIN: NORMAL
SL AMB POCT UROBILINOGEN: 0.2
SL AMB POCT WET MOUNT: ABNORMAL
T VAGINALIS VAG QL WET PREP: NEGATIVE
YEAST VAG QL WET PREP: NEGATIVE

## 2022-10-03 PROCEDURE — 87591 N.GONORRHOEAE DNA AMP PROB: CPT | Performed by: OBSTETRICS & GYNECOLOGY

## 2022-10-03 PROCEDURE — 87210 SMEAR WET MOUNT SALINE/INK: CPT | Performed by: OBSTETRICS & GYNECOLOGY

## 2022-10-03 PROCEDURE — 87491 CHLMYD TRACH DNA AMP PROBE: CPT | Performed by: OBSTETRICS & GYNECOLOGY

## 2022-10-03 PROCEDURE — 81002 URINALYSIS NONAUTO W/O SCOPE: CPT | Performed by: OBSTETRICS & GYNECOLOGY

## 2022-10-03 PROCEDURE — 87086 URINE CULTURE/COLONY COUNT: CPT | Performed by: OBSTETRICS & GYNECOLOGY

## 2022-10-03 PROCEDURE — 99203 OFFICE O/P NEW LOW 30 MIN: CPT | Performed by: OBSTETRICS & GYNECOLOGY

## 2022-10-03 RX ORDER — METRONIDAZOLE 500 MG/1
500 TABLET ORAL EVERY 12 HOURS SCHEDULED
Qty: 14 TABLET | Refills: 0 | Status: SHIPPED | OUTPATIENT
Start: 2022-10-03 | End: 2022-10-10

## 2022-10-03 SDOH — ECONOMIC STABILITY - INCOME SECURITY: PROBLEM RELATED TO HOUSING AND ECONOMIC CIRCUMSTANCES, UNSPECIFIED: Z59.9

## 2022-10-03 NOTE — LETTER
10/7/2022    To Renetta Love  : 1980      This letter is to advise you that your recent CULTURES for gonorrhea and chlamydia were reviewed by me and are NORMAL  Please contact the office for an appointment if you have any additional concerns      Liddie Christ Toussaint-Foster

## 2022-10-03 NOTE — PROGRESS NOTES
PROBLEM GYNECOLOGICAL VISIT    Finesse Santillan is a 38 y o  female who presents today with complaint of pelvic pain  Her general medical history has been reviewed and she reports it as follows:    Past Medical History:   Diagnosis Date    Asthma     Hx of blood clots     Psoriasis     Spinal stenosis      Past Surgical History:   Procedure Laterality Date    APPENDECTOMY      BACK SURGERY       SECTION      x 3    HYSTERECTOMY  2015    Radical     OB History    No obstetric history on file  Social History     Tobacco Use    Smoking status: Former Smoker     Packs/day: 0 25     Types: Cigarettes    Smokeless tobacco: Never Used   Vaping Use    Vaping Use: Never used   Substance Use Topics    Alcohol use: No    Drug use: No     Social History     Substance and Sexual Activity   Sexual Activity Yes       Current Outpatient Medications   Medication Instructions    metroNIDAZOLE (FLAGYL) 500 mg, Oral, Every 12 hours scheduled    rivaroxaban (XARELTO) 20 mg, Oral, Daily with breakfast    rivaroxaban (XARELTO) 15 mg, Oral, Daily with breakfast       History of Present Illness:   Patient presents with c/o right sided pelvic pain radiating in the flank area  Patient states that her boyfriend was sleeping around requesting STI testing and having vaginal discharge  Review of Systems:  Review of Systems   Genitourinary: Positive for vaginal discharge  All other systems reviewed and are negative  Physical Exam:  /76   Pulse 69   Ht 5' 5" (1 651 m)   Wt 123 kg (271 lb)   BMI 45 10 kg/m²   Physical Exam  Constitutional:       Appearance: Normal appearance  Genitourinary:      Bladder and urethral meatus normal       No lesions in the vagina  Right Labia: No rash or tenderness  Left Labia: No tenderness or rash  Vaginal discharge present  Right Adnexa: not tender, not full and no mass present  Left Adnexa: not tender, not full and no mass present  Cervical discharge present  No cervical motion tenderness or lesion  Uterus is not enlarged or tender  No uterine mass detected  No urethral tenderness or mass present  Neurological:      Mental Status: She is alert  Vitals reviewed  Point of Care Testing:   -Wet mount: abnormal   -KOH mount: positive   -Whiff: positive           Assessment:   1  Pelvic pain   2  BV   3  STI exposure      Plan:   1  Cultures ordered: GC/Chlamydia   2  Imaging ordered: pelvic   3  Bloodwork ordered: STI   4  Return to office 3wks  5  Patient's depression screening was assessed with a PHQ-2 score of 3  Their PHQ-9 score was 11    Reviewed with patient that [facility], but that they will not necessarily be reviewed by me immediately  Explained that I will review results at my earliest opportunity and contact patient appropriately

## 2022-10-03 NOTE — PATIENT INSTRUCTIONS
Thank you for your confidence in our team    We appreciate you and welcome your feedback  If you receive a survey from us, please take a few moments to let us know how we are doing     Sincerely,  Yulia Rodríguez

## 2022-10-04 ENCOUNTER — PATIENT OUTREACH (OUTPATIENT)
Dept: OBGYN CLINIC | Facility: CLINIC | Age: 42
End: 2022-10-04

## 2022-10-04 LAB
BACTERIA UR CULT: NORMAL
C TRACH DNA SPEC QL NAA+PROBE: NEGATIVE
N GONORRHOEA DNA SPEC QL NAA+PROBE: NEGATIVE

## 2022-10-06 ENCOUNTER — HOSPITAL ENCOUNTER (OUTPATIENT)
Dept: ULTRASOUND IMAGING | Facility: HOSPITAL | Age: 42
End: 2022-10-06
Payer: COMMERCIAL

## 2022-10-06 DIAGNOSIS — R10.2 PELVIC PAIN: ICD-10-CM

## 2022-10-06 PROCEDURE — 76830 TRANSVAGINAL US NON-OB: CPT

## 2022-10-06 PROCEDURE — 76856 US EXAM PELVIC COMPLETE: CPT

## 2022-10-07 ENCOUNTER — PATIENT OUTREACH (OUTPATIENT)
Dept: OBGYN CLINIC | Facility: CLINIC | Age: 42
End: 2022-10-07

## 2022-10-12 ENCOUNTER — TELEPHONE (OUTPATIENT)
Dept: OBGYN CLINIC | Facility: CLINIC | Age: 42
End: 2022-10-12

## 2022-10-27 ENCOUNTER — OFFICE VISIT (OUTPATIENT)
Dept: OBGYN CLINIC | Facility: CLINIC | Age: 42
End: 2022-10-27

## 2022-10-27 VITALS
HEIGHT: 65 IN | WEIGHT: 269 LBS | SYSTOLIC BLOOD PRESSURE: 128 MMHG | HEART RATE: 80 BPM | BODY MASS INDEX: 44.82 KG/M2 | DIASTOLIC BLOOD PRESSURE: 78 MMHG

## 2022-10-27 DIAGNOSIS — N83.299 COMPLEX OVARIAN CYST: ICD-10-CM

## 2022-10-27 DIAGNOSIS — N70.11 HYDROSALPINX: ICD-10-CM

## 2022-10-27 DIAGNOSIS — Z71.2 ENCOUNTER TO DISCUSS TEST RESULTS: Primary | ICD-10-CM

## 2022-10-27 PROCEDURE — 99213 OFFICE O/P EST LOW 20 MIN: CPT | Performed by: OBSTETRICS & GYNECOLOGY

## 2022-10-27 RX ORDER — DOXYCYCLINE 100 MG/1
100 TABLET ORAL 2 TIMES DAILY
Qty: 20 TABLET | Refills: 0 | Status: SHIPPED | OUTPATIENT
Start: 2022-10-27 | End: 2022-11-06

## 2022-10-28 NOTE — PROGRESS NOTES
PROBLEM GYNECOLOGICAL VISIT    Arie Heck is a 38 y o  female who presents today for result  Her general medical history has been reviewed and she reports it as follows:    Past Medical History:   Diagnosis Date   • Asthma    • Hx of blood clots    • Psoriasis    • Spinal stenosis      Past Surgical History:   Procedure Laterality Date   • APPENDECTOMY     • BACK SURGERY     •  SECTION      x 3   • HYSTERECTOMY  2015    Radical     OB History    No obstetric history on file  Social History     Tobacco Use   • Smoking status: Former Smoker     Packs/day: 0 25     Types: Cigarettes   • Smokeless tobacco: Never Used   Vaping Use   • Vaping Use: Never used   Substance Use Topics   • Alcohol use: No   • Drug use: Yes     Types: Marijuana     Social History     Substance and Sexual Activity   Sexual Activity Not Currently       Current Outpatient Medications   Medication Instructions   • b complex-C-E-zinc (STRESS FORMULA/ZINC) tablet 1 tablet, Oral, Daily   • doxycycline (ADOXA) 100 mg, Oral, 2 times daily   • rivaroxaban (XARELTO) 20 mg, Oral, Daily with breakfast   • rivaroxaban (XARELTO) 15 mg, Oral, Daily with breakfast       History of Present Illness:   Patient presents for follow up s/p pelvic sonogram for pelvic pain with no new complaints  Review of Systems:  Review of Systems   Genitourinary: Positive for pelvic pain  All other systems reviewed and are negative  Physical Exam:  /78   Pulse 80   Ht 5' 5" (1 651 m)   Wt 122 kg (269 lb)   BMI 44 76 kg/m²   Physical Exam  Constitutional:       Appearance: Normal appearance  Neurological:      Mental Status: She is alert  Vitals reviewed       Discussion:  Discuss with patient pelvic sonogram consist of on the left ovary there is a elongated cystic structure most likely a swollen fallopian tube (hydrosalpinx) or a left complex ovarian cyst   Recommendation of an mri for further evaluation will prescribe doxycycline    Assessment:   1  Left hydrosalpinx vs a complex left ovarian cyst    Plan:   1  Imaging ordered: pelvic mri   2  Doxycycline escribe   3  Return to office 3wks  Reviewed with patient that [facility], but that they will not necessarily be reviewed by me immediately  Explained that I will review results at my earliest opportunity and contact patient appropriately

## 2022-10-28 NOTE — PATIENT INSTRUCTIONS
Thank you for your confidence in our team    We appreciate you and welcome your feedback  If you receive a survey from us, please take a few moments to let us know how we are doing     Sincerely,  Wagner Vinson

## 2022-11-21 ENCOUNTER — TELEPHONE (OUTPATIENT)
Dept: OBGYN CLINIC | Facility: CLINIC | Age: 42
End: 2022-11-21

## 2022-11-22 ENCOUNTER — PATIENT OUTREACH (OUTPATIENT)
Dept: OBGYN CLINIC | Facility: CLINIC | Age: 42
End: 2022-11-22

## 2022-11-22 NOTE — PROGRESS NOTES
GIULIANO QUIÑONES planned to f/u with pt in person yesterday at appointment, however, she no-showed and office staff was unable to re-schedule  Sent a letter via [place] today

## 2022-11-22 NOTE — LETTER
[address]  [phone]    Re: Resources   11/22/2022       Dear Seb Triana,    We played phone tag back and forth in October, I was hoping to see you yesterday at your appointment in our office (11/21 with dr Pedro Leyden) but I see you were unable to attend  If you still need help with any resources just know i'm here, you can call me at [phone] or email me at [facility]  [email or web address]  [email or web address]     Sincerely,         aNncy [facility]

## 2023-01-26 ENCOUNTER — HOSPITAL ENCOUNTER (EMERGENCY)
Facility: HOSPITAL | Age: 43
Discharge: HOME | End: 2023-01-26
Attending: STUDENT IN AN ORGANIZED HEALTH CARE EDUCATION/TRAINING PROGRAM

## 2023-01-26 ENCOUNTER — APPOINTMENT (EMERGENCY)
Dept: RADIOLOGY | Facility: HOSPITAL | Age: 43
End: 2023-01-26

## 2023-01-26 VITALS
OXYGEN SATURATION: 97 % | TEMPERATURE: 97.5 F | DIASTOLIC BLOOD PRESSURE: 82 MMHG | SYSTOLIC BLOOD PRESSURE: 128 MMHG | RESPIRATION RATE: 18 BRPM | WEIGHT: 267.8 LBS | BODY MASS INDEX: 44.56 KG/M2 | HEART RATE: 78 BPM

## 2023-01-26 DIAGNOSIS — M54.50 ACUTE MIDLINE LOW BACK PAIN WITHOUT SCIATICA: ICD-10-CM
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
## 2023-01-26 RX ORDER — LIDOCAINE 50 MG/G
1 PATCH TOPICAL ONCE
Status: DISCONTINUED | OUTPATIENT
Start: 2023-01-26 | End: 2023-01-26 | Stop reason: HOSPADM

## 2023-01-26 RX ORDER — METHOCARBAMOL 500 MG/1
500 TABLET, FILM COATED ORAL 2 TIMES DAILY
Qty: 20 TABLET | Refills: 0 | Status: SHIPPED | OUTPATIENT
Start: 2023-01-26

## 2023-01-26 RX ORDER — NAPROXEN 500 MG/1
500 TABLET ORAL 2 TIMES DAILY WITH MEALS
Qty: 30 TABLET | Refills: 0 | Status: SHIPPED | OUTPATIENT
Start: 2023-01-26 | End: 2023-01-26

## 2023-01-26 RX ORDER — LIDOCAINE 50 MG/G
1 PATCH TOPICAL DAILY
Qty: 14 PATCH | Refills: 0 | Status: SHIPPED | OUTPATIENT
Start: 2023-01-26 | End: 2023-02-09

## 2023-01-26 RX ADMIN — LIDOCAINE 1 PATCH: 50 PATCH TOPICAL at 13:34

## 2023-01-27 NOTE — ED PROVIDER NOTES
History  Chief Complaint   Patient presents with   • Damien Daniel yesterday and has pain to left side of back; states she has had surgery to back in the past and surgeon told her to come to the ED  78-year-old female with PMH significant for lumbar surgery approximately 10 months ago here for evaluation after a fall  Patient reports that she hit the left side of her back when she fell  She denies any head injury  She states that since the fall she has had worsening pain in her back associated with some pain shooting into her buttocks  She is not having any numbness or weakness in her legs  No bowel or bladder dysfunction  No fevers  Called her spinal surgeon, who told her to come to the ED for evaluation  Prior to Admission Medications   Prescriptions Last Dose Informant Patient Reported? Taking?   b complex-C-E-zinc (STRESS FORMULA/ZINC) tablet   No No   Sig: Take 1 tablet by mouth daily   rivaroxaban (Xarelto) 15 mg tablet   No No   Sig: Take 1 tablet (15 mg total) by mouth daily with breakfast   Patient not taking: Reported on 10/3/2022   rivaroxaban (Xarelto) 20 mg tablet   No No   Sig: Take 1 tablet (20 mg total) by mouth daily with breakfast   Patient not taking: Reported on 10/27/2022      Facility-Administered Medications: None       Past Medical History:   Diagnosis Date   • Asthma    • Hx of blood clots    • Psoriasis    • Spinal stenosis        Past Surgical History:   Procedure Laterality Date   • APPENDECTOMY     • BACK SURGERY     •  SECTION      x 3   • HYSTERECTOMY  2015    Radical       History reviewed  No pertinent family history  I have reviewed and agree with the history as documented      E-Cigarette/Vaping   • E-Cigarette Use Never User      E-Cigarette/Vaping Substances     Social History     Tobacco Use   • Smoking status: Former     Packs/day: 0 25     Types: Cigarettes   • Smokeless tobacco: Never   Vaping Use   • Vaping Use: Never used   Substance Use Topics • Alcohol use: No   • Drug use: Yes     Types: Marijuana       Review of Systems   Constitutional: Negative for chills and fever  Eyes: Negative for visual disturbance  Cardiovascular: Negative for chest pain  Gastrointestinal: Negative for nausea and vomiting  Genitourinary: Negative for difficulty urinating and dysuria  Musculoskeletal: Positive for back pain  Neurological: Negative for weakness, numbness and headaches  Physical Exam  Physical Exam  Vitals and nursing note reviewed  Constitutional:       General: She is not in acute distress  Appearance: She is not ill-appearing  HENT:      Head: Normocephalic and atraumatic  Eyes:      Conjunctiva/sclera: Conjunctivae normal    Cardiovascular:      Rate and Rhythm: Normal rate and regular rhythm  Pulmonary:      Effort: Pulmonary effort is normal  No respiratory distress  Abdominal:      General: There is no distension  Musculoskeletal:         General: No deformity or signs of injury  Comments: Mild tenderness to palpation, lumbar spine midline and paraspinal   Skin:     General: Skin is warm and dry  Comments: Well-healed surgical incision, lumbar spine midline   Neurological:      Mental Status: She is alert and oriented to person, place, and time        Gait: Gait normal       Comments: Strength 5/5, bilateral lower extremities  Sensation intact, bilateral lower extremities         Vital Signs  ED Triage Vitals [01/26/23 1243]   Temperature Pulse Respirations Blood Pressure SpO2   97 5 °F (36 4 °C) 78 18 128/82 97 %      Temp Source Heart Rate Source Patient Position - Orthostatic VS BP Location FiO2 (%)   Tympanic Monitor Sitting Left arm --      Pain Score       --           Vitals:    01/26/23 1243   BP: 128/82   Pulse: 78   Patient Position - Orthostatic VS: Sitting         Visual Acuity      ED Medications  Medications - No data to display    Diagnostic Studies  Results Reviewed     None                 XR lumbar spine 2 or 3 views   ED Interpretation by Oliver López MD (01/26 8038)   Hardware appears intact  No acute abnormalities  Procedures  Procedures         ED Course                               SBIRT 20yo+    Flowsheet Row Most Recent Value   SBIRT (25 yo +)    In order to provide better care to our patients, we are screening all of our patients for alcohol and drug use  Would it be okay to ask you these screening questions? No Filed at: 01/26/2023 9492                    Medical Decision Making  Patient here with low back pain after a fall  Given her surgical history, we will get an x-ray to evaluate for signs of damaged or misplaced hardware  No red flag signs for spinal emergency such as cauda equina or epidural abscess  X-ray ultimately unremarkable  Symptomatic management reviewed with the patient  She is still on Xarelto, so plan to discharge with naproxen was canceled, and she was instructed on the dangers of taking ibuprofen with the Xarelto  At her request a prescription for Lidoderm patches was sent to her pharmacy along with Robaxin  Recommended follow-up in the next week with her surgeon, return precautions provided at discharge  Acute midline low back pain without sciatica: complicated acute illness or injury  Fall, initial encounter: complicated acute illness or injury  Amount and/or Complexity of Data Reviewed  Radiology: ordered and independent interpretation performed  Risk  Prescription drug management  Disposition  Final diagnoses:   Fall, initial encounter   Acute midline low back pain without sciatica     Time reflects when diagnosis was documented in both MDM as applicable and the Disposition within this note     Time User Action Codes Description Comment    1/26/2023  1:57 PM Henrry Canton Add [H39  DVAI] Fall, initial encounter     1/26/2023  1:58 PM Henrry Canton Add [M54 50] Acute midline low back pain without sciatica       ED Disposition     ED Disposition   Discharge    Condition   Stable    Date/Time   Thu Jan 26, 2023 2921 Rujenna Stubbs discharge to home/self care  Follow-up Information     Follow up With Specialties Details Why Contact Info    Your Surgeon  Schedule an appointment as soon as possible for a visit in 1 week For follow up and reevaluation           Discharge Medication List as of 1/26/2023  2:05 PM      START taking these medications    Details   methocarbamol (ROBAXIN) 500 mg tablet Take 1 tablet (500 mg total) by mouth 2 (two) times a day, Starting Thu 1/26/2023, Normal      naproxen (Naprosyn) 500 mg tablet Take 1 tablet (500 mg total) by mouth 2 (two) times a day with meals, Starting Thu 1/26/2023, Normal         CONTINUE these medications which have NOT CHANGED    Details   b complex-C-E-zinc (STRESS FORMULA/ZINC) tablet Take 1 tablet by mouth daily, Starting Thu 10/27/2022, Until Sat 11/26/2022, Normal      !! rivaroxaban (Xarelto) 15 mg tablet Take 1 tablet (15 mg total) by mouth daily with breakfast, Starting Mon 9/19/2022, Normal      !! rivaroxaban (Xarelto) 20 mg tablet Take 1 tablet (20 mg total) by mouth daily with breakfast, Starting Mon 9/19/2022, Normal       !! - Potential duplicate medications found  Please discuss with provider  No discharge procedures on file      PDMP Review     None          ED Provider  Electronically Signed by           Raj Galeas MD  01/26/23 2011

## 2023-02-23 ENCOUNTER — HOSPITAL ENCOUNTER (EMERGENCY)
Facility: HOSPITAL | Age: 43
Discharge: HOME | End: 2023-02-23
Attending: INTERNAL MEDICINE

## 2023-02-23 VITALS
RESPIRATION RATE: 20 BRPM | DIASTOLIC BLOOD PRESSURE: 75 MMHG | OXYGEN SATURATION: 100 % | SYSTOLIC BLOOD PRESSURE: 131 MMHG | WEIGHT: 261.02 LBS | HEART RATE: 95 BPM | TEMPERATURE: 98 F | BODY MASS INDEX: 43.44 KG/M2

## 2023-02-23 DIAGNOSIS — Z20.2 POSSIBLE EXPOSURE TO STD: ICD-10-CM

## 2023-02-23 DIAGNOSIS — N89.8 VAGINAL DISCHARGE: Primary | ICD-10-CM

## 2023-02-23 LAB
BILIRUB UR QL STRIP: NEGATIVE
CLARITY UR: ABNORMAL
COLOR UR: ABNORMAL
EXT PREGNANCY TEST URINE: NEGATIVE
EXT. CONTROL: NORMAL
GLUCOSE UR STRIP-MCNC: NEGATIVE MG/DL
HGB UR QL STRIP.AUTO: NEGATIVE
HIV 1+2 AB+HIV1 P24 AG SERPL QL IA: NORMAL
HIV1 P24 AG SER QL: NORMAL
KETONES UR STRIP-MCNC: NEGATIVE MG/DL
LEUKOCYTE ESTERASE UR QL STRIP: NEGATIVE
NITRITE UR QL STRIP: NEGATIVE
PH UR STRIP.AUTO: 6 [PH]
PROT UR STRIP-MCNC: NEGATIVE MG/DL
SP GR UR STRIP.AUTO: 1.02 (ref 1–1.04)
UROBILINOGEN UA: NEGATIVE MG/DL

## 2023-02-23 RX ORDER — DOXYCYCLINE HYCLATE 100 MG/1
100 CAPSULE ORAL 2 TIMES DAILY
Qty: 14 CAPSULE | Refills: 0 | Status: SHIPPED | OUTPATIENT
Start: 2023-02-23 | End: 2023-03-02

## 2023-02-23 RX ORDER — FLUCONAZOLE 100 MG/1
200 TABLET ORAL ONCE
Status: COMPLETED | OUTPATIENT
Start: 2023-02-23 | End: 2023-02-23

## 2023-02-23 RX ADMIN — FLUCONAZOLE 200 MG: 100 TABLET ORAL at 22:28

## 2023-02-23 RX ADMIN — LIDOCAINE HYDROCHLORIDE 500 MG: 10 INJECTION, SOLUTION EPIDURAL; INFILTRATION; INTRACAUDAL; PERINEURAL at 22:28

## 2023-02-23 NOTE — Clinical Note
Josh Crew was seen and treated in our emergency department on 2/23/2023  Diagnosis:     Katja Christianson  is off the rest of the shift today  She may return on this date: If you have any questions or concerns, please don't hesitate to call        Joe Benton MD    ______________________________           _______________          _______________  [facility] Representative                              Date                                Time

## 2023-02-23 NOTE — Clinical Note
Lisandra Kirkland was seen and treated in our emergency department on 2/23/2023  Diagnosis:     Alicia Bonilla  is off the rest of the shift today  She may return on this date: If you have any questions or concerns, please don't hesitate to call        Carolann Camejo MD    ______________________________           _______________          _______________  [facility] Representative                              Date                                Time

## 2023-02-24 DIAGNOSIS — B96.89 BV (BACTERIAL VAGINOSIS): Primary | ICD-10-CM

## 2023-02-24 DIAGNOSIS — N76.0 BV (BACTERIAL VAGINOSIS): Primary | ICD-10-CM

## 2023-02-24 LAB
C GLABRATA DNA VAG QL NAA+PROBE: NEGATIVE
C KRUSEI DNA VAG QL NAA+PROBE: NEGATIVE
C TRACH DNA SPEC QL NAA+PROBE: NEGATIVE
CANDIDA SP 6 PNL VAG NAA+PROBE: POSITIVE
N GONORRHOEA DNA SPEC QL NAA+PROBE: NEGATIVE
T VAGINALIS DNA VAG QL NAA+PROBE: NEGATIVE
TREPONEMA PALLIDUM IGG+IGM AB [PRESENCE] IN SERUM OR PLASMA BY IMMUNOASSAY: NORMAL
VAGINOSIS/ITIS DNA PNL VAG PROBE+SIG AMP: POSITIVE

## 2023-02-24 RX ORDER — METRONIDAZOLE 500 MG/1
500 TABLET ORAL EVERY 12 HOURS SCHEDULED
Qty: 14 TABLET | Refills: 0 | Status: SHIPPED | OUTPATIENT
Start: 2023-02-24 | End: 2023-03-03

## 2023-02-24 NOTE — ED PROVIDER NOTES
History  Chief Complaint   Patient presents with   • Vaginal Discharge     HPI  68-year-old woman presents to ED for evaluation of vaginal discharge, vaginal itching and dysuria  Patient reports symptoms been going on for 1 day  She reports reports she had sexual intercourse with her boyfriend who she recently found out has been cheating  She is unsure if she had any exposure to STDs  She does not use barrier contraceptives  She did have a hysterectomy years ago  Patient denies headache, visual changes, dizziness, fevers, chills, chest pain, palpitations, abdominal pain, diarrhea, melena, hematochezia, dysuria, new skin rashes or numbness or tingling of the extremities  Prior to Admission Medications   Prescriptions Last Dose Informant Patient Reported? Taking?   b complex-C-E-zinc (STRESS FORMULA/ZINC) tablet   No No   Sig: Take 1 tablet by mouth daily   lidocaine (Lidoderm) 5 %   No No   Sig: Apply 1 patch topically over 12 hours daily for 14 days Remove & Discard patch within 12 hours or as directed by MD   methocarbamol (ROBAXIN) 500 mg tablet   No No   Sig: Take 1 tablet (500 mg total) by mouth 2 (two) times a day   rivaroxaban (Xarelto) 15 mg tablet   No No   Sig: Take 1 tablet (15 mg total) by mouth daily with breakfast   Patient not taking: Reported on 10/3/2022   rivaroxaban (Xarelto) 20 mg tablet   No No   Sig: Take 1 tablet (20 mg total) by mouth daily with breakfast   Patient not taking: Reported on 10/27/2022      Facility-Administered Medications: None       Past Medical History:   Diagnosis Date   • Asthma    • Hx of blood clots    • Psoriasis    • Spinal stenosis        Past Surgical History:   Procedure Laterality Date   • APPENDECTOMY     • BACK SURGERY     •  SECTION      x 3   • HYSTERECTOMY  2015    Radical       History reviewed  No pertinent family history  I have reviewed and agree with the history as documented      E-Cigarette/Vaping   • E-Cigarette Use Never User E-Cigarette/Vaping Substances     Social History     Tobacco Use   • Smoking status: Former     Packs/day: 0 25     Types: Cigarettes   • Smokeless tobacco: Never   Vaping Use   • Vaping Use: Never used   Substance Use Topics   • Alcohol use: No   • Drug use: Not Currently     Types: Marijuana       Review of Systems   All other systems reviewed and are negative  Physical Exam  Physical Exam  Vitals and nursing note reviewed  Exam conducted with a chaperone present  Constitutional:       General: She is not in acute distress  Appearance: She is well-developed  HENT:      Head: Normocephalic and atraumatic  Eyes:      Conjunctiva/sclera: Conjunctivae normal    Cardiovascular:      Rate and Rhythm: Normal rate and regular rhythm  Heart sounds: No murmur heard  Pulmonary:      Effort: Pulmonary effort is normal  No respiratory distress  Breath sounds: Normal breath sounds  Abdominal:      Palpations: Abdomen is soft  Tenderness: There is no abdominal tenderness  Genitourinary:     Comments: Labia with pustules, no erythema, no ulcers, no vesicles, no condylomas  Musculoskeletal:         General: No swelling  Cervical back: Neck supple  Skin:     General: Skin is warm and dry  Capillary Refill: Capillary refill takes less than 2 seconds  Neurological:      Mental Status: She is alert     Psychiatric:         Mood and Affect: Mood normal          Vital Signs  ED Triage Vitals [02/23/23 2059]   Temperature Pulse Respirations Blood Pressure SpO2   98 °F (36 7 °C) 95 20 131/75 100 %      Temp Source Heart Rate Source Patient Position - Orthostatic VS BP Location FiO2 (%)   Oral Monitor Sitting Left arm --      Pain Score       7           Vitals:    02/23/23 2059   BP: 131/75   Pulse: 95   Patient Position - Orthostatic VS: Sitting         Visual Acuity      ED Medications  Medications   fluconazole (DIFLUCAN) tablet 200 mg (200 mg Oral Given 2/23/23 2228)   cefTRIAXone (ROCEPHIN) 500 mg in lidocaine (PF) (XYLOCAINE-MPF) 1 % IM only syringe (500 mg Intramuscular Given 2/23/23 2228)       Diagnostic Studies  Results Reviewed     Procedure Component Value Units Date/Time    RAPID HIV 1/2 AB-AG COMBO for 15years old and above [[id number]]  (Normal) Collected: 02/23/23 2133    Lab Status: Final result Specimen: Blood from Arm, Right Updated: 02/23/23 2209     Rapid HIV 1 AND 2 Non-Reactive     HIV-1 P24 Ag Screen Non-Reactive    Narrative:      Negative for HIV-1 p24 Antigen  Negative for HIV-1 and/or HIV-2 Antibody  UA w Reflex to Microscopic w Reflex to Culture [[id number]]  (Abnormal) Collected: 02/23/23 2134    Lab Status: Final result Specimen: Urine, Clean Catch Updated: 02/23/23 2153     Color, UA Straw     Clarity, UA Slightly Cloudy     Specific [place], UA 1 020     pH, UA 6 0     Leukocytes, UA Negative     Nitrite, UA Negative     Protein, UA Negative mg/dl      Glucose, UA Negative mg/dl      Ketones, UA Negative mg/dl      Bilirubin, UA Negative     Occult Blood, UA Negative     UROBILINOGEN UA Negative mg/dL     RPR-Syphilis Screening (Total Syphilis IGG/IGM) [[id number]] Collected: 02/23/23 2133    Lab Status: In process Specimen: Blood from Arm, Right Updated: 02/23/23 2146    Molecular Vaginal Panel [[id number]] Collected: 02/23/23 2133    Lab Status: In process Specimen: Genital from Vaginal Updated: 02/23/23 2146    [address] [place] [address] amplified DNA by PCR [[id number]] Collected: 02/23/23 2133    Lab Status:  In process Specimen: Urine, Other Updated: 02/23/23 2145    POCT pregnancy, urine [[id number]]  (Normal) Resulted: 02/23/23 2142    Lab Status: Final result Updated: 02/23/23 2143     EXT Preg Test, Ur Negative     Control Valid                 No orders to display              Procedures  Procedures         ED Course  ED Course as of 02/23/23 2232   Thu Feb 23, 2023 2217 PREGNANCY TEST URINE: Negative   2217 Leukocytes, UA: Negative   2217 Nitrite, UA: Negative 2217 RAPID HIV 1 AND 2: Non-Reactive                                             Medical Decision Making  78-year-old woman presents to ED for evaluation of vaginal discharge, vaginal itching and dysuria  For initial diagnosis includes candidiasis, cystitis, gonorrhea, chlamydia, syphilis, HIV  We will send testing  Given the appearance, vulvitis with pustules, this is consistent with candidiasis  Patient will be treated with fluconazole while in the ED she was also be treated prophylactically for STIs  Patient will be called regarding the rest of her results  Patient counseled on abstaining from sexual intercourse until her antibiotics course is complete  Possible exposure to STD: acute illness or injury  Vaginal discharge: acute illness or injury  Amount and/or Complexity of Data Reviewed  Labs: ordered  Decision-making details documented in ED Course  Risk  Prescription drug management  Disposition  Final diagnoses:   Vaginal discharge   Possible exposure to STD     Time reflects when diagnosis was documented in both MDM as applicable and the Disposition within this note     Time User Action Codes Description Comment    2/23/2023 10:23 PM Edy Girard [N89 8] Vaginal discharge     2/23/2023 10:23 PM Edy Girard [Z20 2] Possible exposure to STD       ED Disposition     ED Disposition   Discharge    Condition   Stable    Date/Time   Thu Feb 23, 2023 10:23 PM    Comment   Jennifer Tong discharge to home/self care  Follow-up Information    None         Patient's Medications   Discharge Prescriptions    DOXYCYCLINE HYCLATE (VIBRAMYCIN) 100 MG CAPSULE    Take 1 capsule (100 mg total) by mouth 2 (two) times a day for 7 days       Start Date: 2/23/2023 End Date: 3/2/2023       Order Dose: 100 mg       Quantity: 14 capsule    Refills: 0       No discharge procedures on file      PDMP Review     None          ED Provider  Electronically Signed by           Tahira Olivera Kassandra Moreno MD  02/23/23 2903

## 2023-03-01 ENCOUNTER — HOSPITAL ENCOUNTER (EMERGENCY)
Facility: HOSPITAL | Age: 43
Discharge: HOME | End: 2023-03-01
Attending: EMERGENCY MEDICINE

## 2023-03-01 VITALS
OXYGEN SATURATION: 100 % | BODY MASS INDEX: 43.7 KG/M2 | TEMPERATURE: 99 F | DIASTOLIC BLOOD PRESSURE: 67 MMHG | HEART RATE: 58 BPM | WEIGHT: 262.6 LBS | SYSTOLIC BLOOD PRESSURE: 110 MMHG | RESPIRATION RATE: 20 BRPM

## 2023-03-01 DIAGNOSIS — R11.0 NAUSEA: ICD-10-CM

## 2023-03-01 DIAGNOSIS — Z92.89 HISTORY OF DENTAL SURGERY: Primary | ICD-10-CM

## 2023-03-01 DIAGNOSIS — R58 BLEEDING: ICD-10-CM

## 2023-03-01 RX ORDER — ONDANSETRON 4 MG/1
4 TABLET, ORALLY DISINTEGRATING ORAL EVERY 8 HOURS PRN
Qty: 20 TABLET | Refills: 0 | Status: SHIPPED | OUTPATIENT
Start: 2023-03-01 | End: 2023-03-11

## 2023-03-01 RX ORDER — ONDANSETRON 4 MG/1
4 TABLET, ORALLY DISINTEGRATING ORAL ONCE
Status: COMPLETED | OUTPATIENT
Start: 2023-03-01 | End: 2023-03-01

## 2023-03-01 RX ORDER — TRANEXAMIC ACID 100 MG/ML
500 INJECTION, SOLUTION INTRAVENOUS ONCE
Status: COMPLETED | OUTPATIENT
Start: 2023-03-01 | End: 2023-03-01

## 2023-03-01 RX ADMIN — TRANEXAMIC ACID 500 MG: 100 INJECTION, SOLUTION INTRAVENOUS at 12:31

## 2023-03-01 RX ADMIN — ONDANSETRON 4 MG: 4 TABLET, ORALLY DISINTEGRATING ORAL at 12:26

## 2023-03-01 NOTE — Clinical Note
Steven Pedroerly was seen and treated in our emergency department on 3/1/2023  Diagnosis:     Stacey Ibanez  may return to work on return date  She may return on this date: 03/03/2023         If you have any questions or concerns, please don't hesitate to call        Richie Mckinnon PA-C    ______________________________           _______________          _______________  Hospital Representative                              Date                                Time

## 2023-03-01 NOTE — ED PROVIDER NOTES
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  Taking?   b complex-C-E-zinc (STRESS FORMULA/ZINC) tablet   No No   Sig: Take 1 tablet by mouth daily   doxycycline hyclate (VIBRAMYCIN) 100 mg capsule   No No   Sig: Take 1 capsule (100 mg total) by mouth 2 (two) times a day for 7 days   lidocaine (Lidoderm) 5 %   No No   Sig: Apply 1 patch topically over 12 hours daily for 14 days Remove & Discard patch within 12 hours or as directed by MD   methocarbamol (ROBAXIN) 500 mg tablet   No No   Sig: Take 1 tablet (500 mg total) by mouth 2 (two) times a day   metroNIDAZOLE (FLAGYL) 500 mg tablet   No No   Sig: Take 1 tablet (500 mg total) by mouth every 12 (twelve) hours for 7 days   rivaroxaban (Xarelto) 15 mg tablet   No No   Sig: Take 1 tablet (15 mg total) by mouth daily with breakfast   Patient not taking: Reported on 10/3/2022   rivaroxaban (Xarelto) 20 mg tablet   No No Sig: Take 1 tablet (20 mg total) by mouth daily with breakfast   Patient not taking: Reported on 10/27/2022      Facility-Administered Medications: None       Past Medical History:   Diagnosis Date   • Asthma    • Hx of blood clots    • Psoriasis    • Spinal stenosis        Past Surgical History:   Procedure Laterality Date   • APPENDECTOMY     • BACK SURGERY     •  SECTION      x 3   • HYSTERECTOMY  2015    Radical       History reviewed  No pertinent family history  I have reviewed and agree with the history as documented  E-Cigarette/Vaping   • E-Cigarette Use Never User      E-Cigarette/Vaping Substances     Social History     Tobacco Use   • Smoking status: Former     Packs/day: 0 25     Types: Cigarettes   • Smokeless tobacco: Never   Vaping Use   • Vaping Use: Never used   Substance Use Topics   • Alcohol use: No   • Drug use: Not Currently     Types: Marijuana       Review of Systems   Constitutional: Negative for chills, fatigue and fever  HENT: Positive for dental problem  Negative for congestion, ear pain, rhinorrhea and sore throat  Eyes: Negative for redness  Respiratory: Negative for chest tightness and shortness of breath  Cardiovascular: Negative for chest pain and palpitations  Gastrointestinal: Negative for abdominal pain, nausea and vomiting  Genitourinary: Negative for dysuria and hematuria  Musculoskeletal: Negative  Skin: Negative for rash  Neurological: Negative for dizziness, syncope, light-headedness and numbness  Physical Exam  Physical Exam  Vitals and nursing note reviewed  Constitutional:       Appearance: She is well-developed  HENT:      Head: Normocephalic  Comments: Patient is managing oral secretions without difficulty  No muffled or hot potato voice  Mouth/Throat:     Eyes:      General: No scleral icterus  Cardiovascular:      Rate and Rhythm: Normal rate and regular rhythm     Pulmonary:      Effort: Pulmonary effort is normal       Breath sounds: Normal breath sounds  No stridor  Abdominal:      General: There is no distension  Palpations: Abdomen is soft  Tenderness: There is no abdominal tenderness  Musculoskeletal:         General: Normal range of motion  Skin:     General: Skin is warm and dry  Capillary Refill: Capillary refill takes less than 2 seconds  Neurological:      Mental Status: She is alert and oriented to person, place, and time  Vital Signs  ED Triage Vitals [03/01/23 1210]   Temperature Pulse Respirations Blood Pressure SpO2   99 °F (37 2 °C) 58 20 110/67 100 %      Temp Source Heart Rate Source Patient Position - Orthostatic VS BP Location FiO2 (%)   Tympanic Monitor Sitting Left arm --      Pain Score       --           Vitals:    03/01/23 1210   BP: 110/67   Pulse: 58   Patient Position - Orthostatic VS: Sitting         Visual Acuity      ED Medications  Medications   tranexamic acid 100mg/mL (for epistaxis) 500 mg (500 mg Nasal Given by Other 3/1/23 1231)   ondansetron (ZOFRAN-ODT) dispersible tablet 4 mg (4 mg Oral Given 3/1/23 1226)       Diagnostic Studies  Results Reviewed     None                 No orders to display              Procedures  Procedures         ED Course                               SBIRT 20yo+    Flowsheet Row Most Recent Value   SBIRT (23 yo +)    In order to provide better care to our patients, we are screening all of our patients for alcohol and drug use  Would it be okay to ask you these screening questions? No Filed at: 03/01/2023 1230                    Medical Decision Making  43-year-old female presents for dental bleeding after a tooth extraction  Patient is on Xarelto at this time  TXA soaked gauze/dental roll placed on the area patient instructed to bite down for 15 minutes at this location  Zofran administered for nausea secondary to swallowing blood  Patient reports no other complaints and is managing oral secretions without difficulty  Hemostasis achieved after TXA application  Patient reported nausea has improved with Zofran is requesting a prescription for this medication to use at home  Advised to follow-up with dentist and family care provider and advised to eat and drink liquids as opposed to solid foods so as to prevent further dental trauma  All imaging and/or lab testing discussed with patient, strict return to ED precautions discussed  Patient and/or family members verbalizes understanding and agrees with plan  Patient is stable for discharge     Portions of the record may have been created with voice recognition software  Occasional wrong word or "sound a like" substitutions may have occurred due to the inherent limitations of voice recognition software  Read the chart carefully and recognize, using context, where substitutions have occurred  Risk  Prescription drug management  Disposition  Final diagnoses:   History of dental surgery   Bleeding   Nausea     Time reflects when diagnosis was documented in both MDM as applicable and the Disposition within this note     Time User Action Codes Description Comment    3/1/2023 12:58 PM Coikehinde Girard [Z92 [id number]] History of dental surgery     3/1/2023 12:58 PM Brenda Roberson Add [R58] Bleeding     3/1/2023 12:58 PM Brenda Girard [R11 0] Nausea       ED Disposition     ED Disposition   Discharge    Condition   Stable    Date/Time   Wed Mar 1, 2023 12:59 PM    Comment   Krunal [place]conda discharge to home/self care                 Follow-up Information     Follow up With Specialties Details Why [address]  Schedule an appointment as soon as possible for a visit in 2 days  [address]          Patient's Medications   Discharge Prescriptions    ONDANSETRON (ZOFRAN-ODT) 4 MG DISINTEGRATING TABLET    Take 1 tablet (4 mg total) by mouth every 8 (eight) hours as needed for nausea for up to 10 days       Start Date: 3/1/2023  End Date: 3/11/2023       Order Dose: 4 mg       Quantity: 20 tablet    Refills: 0       No discharge procedures on file      PDMP Review     None          ED Provider  Electronically Signed by           Paige Garcia PA-C  03/01/23 0674

## 2023-04-03 ENCOUNTER — HOSPITAL ENCOUNTER (EMERGENCY)
Facility: HOSPITAL | Age: 43
Discharge: HOME | End: 2023-04-03
Attending: EMERGENCY MEDICINE

## 2023-04-03 VITALS
BODY MASS INDEX: 43.58 KG/M2 | SYSTOLIC BLOOD PRESSURE: 134 MMHG | DIASTOLIC BLOOD PRESSURE: 78 MMHG | HEART RATE: 66 BPM | RESPIRATION RATE: 18 BRPM | TEMPERATURE: 98.1 F | OXYGEN SATURATION: 99 % | WEIGHT: 261.91 LBS

## 2023-04-03 DIAGNOSIS — L25.9 CONTACT DERMATITIS: Primary | ICD-10-CM

## 2023-04-03 RX ORDER — DIPHENHYDRAMINE HCL 25 MG
25 TABLET ORAL ONCE
Status: COMPLETED | OUTPATIENT
Start: 2023-04-03 | End: 2023-04-03

## 2023-04-03 RX ORDER — ERYTHROMYCIN 5 MG/G
OINTMENT OPHTHALMIC
Qty: 3.5 G | Refills: 0 | Status: SHIPPED | OUTPATIENT
Start: 2023-04-03

## 2023-04-03 RX ORDER — ERYTHROMYCIN 5 MG/G
0.5 OINTMENT OPHTHALMIC ONCE
Status: COMPLETED | OUTPATIENT
Start: 2023-04-03 | End: 2023-04-03

## 2023-04-03 RX ORDER — DIPHENHYDRAMINE HCL 25 MG
50 TABLET ORAL EVERY 6 HOURS
Qty: 20 TABLET | Refills: 0 | Status: SHIPPED | OUTPATIENT
Start: 2023-04-03

## 2023-04-03 RX ADMIN — ERYTHROMYCIN 0.5 INCH: 5 OINTMENT OPHTHALMIC at 20:28

## 2023-04-03 RX ADMIN — DIPHENHYDRAMINE HYDROCHLORIDE 25 MG: 25 TABLET ORAL at 20:28

## 2023-04-04 NOTE — DISCHARGE INSTRUCTIONS
Use medications as directed  Return immediately for new or worsening complaints including worsening swelling, changes in vision or pain in your eyes  Follow-up with ophthalmology and primary care

## 2023-04-04 NOTE — ED PROVIDER NOTES
History  Chief Complaint   Patient presents with   • Burning Eyes     Pt reports that two days ago she had eyelash extensions placed  Pt reports that she started having burning in both eyes, blurred vision and drainage starting yesterday  49-year-old female without significant past medical history presents complaining of bilateral eye irritation after getting lash extensions placed 3 days ago  Patient states that she has had extension several times before but went to a new place this time and believes she is having a reaction to the extensions  Patient does not wear contact lenses  Denies visual changes  Admits to ocular erythema with drainage  Denies any other complaints  History provided by:  Patient   used: No        Prior to Admission Medications   Prescriptions Last Dose Informant Patient Reported?  Taking?   b complex-C-E-zinc (STRESS FORMULA/ZINC) tablet   No No   Sig: Take 1 tablet by mouth daily   lidocaine (Lidoderm) 5 %   No No   Sig: Apply 1 patch topically over 12 hours daily for 14 days Remove & Discard patch within 12 hours or as directed by MD   methocarbamol (ROBAXIN) 500 mg tablet   No No   Sig: Take 1 tablet (500 mg total) by mouth 2 (two) times a day   ondansetron (ZOFRAN-ODT) 4 mg disintegrating tablet   No No   Sig: Take 1 tablet (4 mg total) by mouth every 8 (eight) hours as needed for nausea for up to 10 days   rivaroxaban (Xarelto) 15 mg tablet   No No   Sig: Take 1 tablet (15 mg total) by mouth daily with breakfast   Patient not taking: Reported on 10/3/2022   rivaroxaban (Xarelto) 20 mg tablet   No No   Sig: Take 1 tablet (20 mg total) by mouth daily with breakfast   Patient not taking: Reported on 10/27/2022      Facility-Administered Medications: None       Past Medical History:   Diagnosis Date   • Asthma    • Hx of blood clots    • Psoriasis    • Spinal stenosis        Past Surgical History:   Procedure Laterality Date   • APPENDECTOMY     • BACK SURGERY     •  SECTION      x 3   • HYSTERECTOMY  2015    Radical       History reviewed  No pertinent family history  I have reviewed and agree with the history as documented  E-Cigarette/Vaping   • E-Cigarette Use Never User      E-Cigarette/Vaping Substances     Social History     Tobacco Use   • Smoking status: Former     Packs/day: 0 25     Types: Cigarettes   • Smokeless tobacco: Never   Vaping Use   • Vaping Use: Never used   Substance Use Topics   • Alcohol use: No   • Drug use: Not Currently     Types: Marijuana       Review of Systems   Constitutional: Negative  Negative for chills and fatigue  HENT: Negative for ear pain and sore throat  Eyes: Positive for discharge, redness and itching  Negative for photophobia  Respiratory: Negative for apnea, cough and shortness of breath  Cardiovascular: Negative for chest pain  Gastrointestinal: Negative for abdominal pain, nausea and vomiting  Genitourinary: Negative for dysuria  Musculoskeletal: Negative for arthralgias, neck pain and neck stiffness  Skin: Negative for rash  Neurological: Negative for dizziness, tremors, syncope and weakness  Psychiatric/Behavioral: Negative for suicidal ideas  Physical Exam  Physical Exam  Constitutional:       General: She is not in acute distress  Appearance: She is well-developed  She is not diaphoretic  Eyes:      Extraocular Movements: Extraocular movements intact  Pupils: Pupils are equal, round, and reactive to light  Comments: Bilateral mild conjunctivitis with some crusting and discharge noted  Erythema noted at the base of lash extensions  Mild edema noted in the eyelids however no periorbital edema  No proptosis ophthalmoplegia or pain with EOMs  Cardiovascular:      Rate and Rhythm: Normal rate and regular rhythm  Pulmonary:      Effort: Pulmonary effort is normal  No respiratory distress  Breath sounds: Normal breath sounds     Abdominal: General: Bowel sounds are normal  There is no distension  Palpations: Abdomen is soft  Musculoskeletal:         General: Normal range of motion  Cervical back: Normal range of motion and neck supple  Skin:     General: Skin is warm and dry  Neurological:      Mental Status: She is alert and oriented to person, place, and time  Vital Signs  ED Triage Vitals [04/03/23 2015]   Temperature Pulse Respirations Blood Pressure SpO2   98 1 °F (36 7 °C) 66 18 134/78 99 %      Temp Source Heart Rate Source Patient Position - Orthostatic VS BP Location FiO2 (%)   Oral Monitor Sitting Left arm --      Pain Score       --           Vitals:    04/03/23 2015   BP: 134/78   Pulse: 66   Patient Position - Orthostatic VS: Sitting         Visual Acuity      ED Medications  Medications   erythromycin (ILOTYCIN) 0 5 % ophthalmic ointment 0 5 inch (0 5 inches Both Eyes Given 4/3/23 2028)   diphenhydrAMINE (BENADRYL) tablet 25 mg (25 mg Oral Given 4/3/23 2028)       Diagnostic Studies  Results Reviewed     None                 No orders to display              Procedures  Procedures         ED Course                                             Medical Decision Making  Patient had evidence of contact dermatitis secondary to eyelash extension placement  Patient was started on Benadryl as well as topical antibiotics  Patient did not have any signs or symptoms of periorbital or orbital cellulitis at this time however was educated on strict return precautions and demonstrates understanding  Patient was advised to follow-up should she get any further swelling or pain and follow-up with her [facility] specialist to discuss removal   Discharged home  Risk  OTC drugs  Prescription drug management            Disposition  Final diagnoses:   Contact dermatitis     Time reflects when diagnosis was documented in both MDM as applicable and the Disposition within this note     Time User Action Codes Description Comment    4/3/2023 8:27 PM Manny Morelandry Add [L25 9] Contact dermatitis       ED Disposition     ED Disposition   Discharge    Condition   Stable    Date/Time   Mon Apr 3, 2023  8:27 PM    Comment   Gaby Garces discharge to home/self care  Follow-up Information     Follow up With Specialties Details Why Contact Info Additional [address] [facility] Emergency Department Emergency Medicine Go to  If symptoms worsen [address] [id number] [address] [facility] Emergency Department    [facility] Schedule an appointment as soon as possible for a visit in 3 days If symptoms worsen [address] [facility] [address] [facility] [address]  [phone]             Patient's Medications   Discharge Prescriptions    DIPHENHYDRAMINE (BENADRYL) 25 MG TABLET    Take 2 tablets (50 mg total) by mouth every 6 (six) hours       Start Date: 4/3/2023  End Date: --       Order Dose: 50 mg       Quantity: 20 tablet    Refills: 0    ERYTHROMYCIN (ILOTYCIN) OPHTHALMIC OINTMENT    Place a 1/2 inch ribbon of ointment into the lower eyelid  Start Date: 4/3/2023  End Date: --       Order Dose: --       Quantity: 3 5 g    Refills: 0       No discharge procedures on file      PDMP Review     None          ED Provider  Electronically Signed by           Laina Sow PA-C  04/03/23 2033

## 2023-07-24 ENCOUNTER — HOSPITAL ENCOUNTER (EMERGENCY)
Facility: HOSPITAL | Age: 43
Discharge: HOME | End: 2023-07-24
Attending: EMERGENCY MEDICINE | Admitting: EMERGENCY MEDICINE
Payer: COMMERCIAL

## 2023-07-24 ENCOUNTER — APPOINTMENT (EMERGENCY)
Dept: CT IMAGING | Facility: HOSPITAL | Age: 43
End: 2023-07-24
Payer: COMMERCIAL

## 2023-07-24 VITALS
SYSTOLIC BLOOD PRESSURE: 102 MMHG | RESPIRATION RATE: 20 BRPM | WEIGHT: 260.14 LBS | BODY MASS INDEX: 43.29 KG/M2 | OXYGEN SATURATION: 97 % | TEMPERATURE: 98 F | DIASTOLIC BLOOD PRESSURE: 55 MMHG | HEART RATE: 55 BPM

## 2023-07-24 DIAGNOSIS — S01.81XA LACERATION OF FOREHEAD, INITIAL ENCOUNTER: ICD-10-CM

## 2023-07-24 DIAGNOSIS — R55 SYNCOPE: Primary | ICD-10-CM

## 2023-07-24 DIAGNOSIS — S09.90XA MINOR HEAD INJURY, INITIAL ENCOUNTER: ICD-10-CM

## 2023-07-24 DIAGNOSIS — R00.2 PALPITATIONS: ICD-10-CM

## 2023-07-24 LAB
ANION GAP SERPL CALCULATED.3IONS-SCNC: 4 MMOL/L
ATRIAL RATE: 53 BPM
BASOPHILS # BLD AUTO: 0.06 THOUSANDS/ÂΜL (ref 0–0.1)
BASOPHILS NFR BLD AUTO: 1 % (ref 0–1)
BUN SERPL-MCNC: 12 MG/DL (ref 5–25)
CALCIUM SERPL-MCNC: 8.8 MG/DL (ref 8.4–10.2)
CHLORIDE SERPL-SCNC: 107 MMOL/L (ref 96–108)
CO2 SERPL-SCNC: 27 MMOL/L (ref 21–32)
CREAT SERPL-MCNC: 0.74 MG/DL (ref 0.6–1.3)
EOSINOPHIL # BLD AUTO: 0.23 THOUSAND/ÂΜL (ref 0–0.61)
EOSINOPHIL NFR BLD AUTO: 3 % (ref 0–6)
ERYTHROCYTE [DISTWIDTH] IN BLOOD BY AUTOMATED COUNT: 14.4 % (ref 11.6–15.1)
GFR SERPL CREATININE-BSD FRML MDRD: 98 ML/MIN/1.73SQ M
GLUCOSE SERPL-MCNC: 83 MG/DL (ref 65–140)
HCT VFR BLD AUTO: 44.2 % (ref 34.8–46.1)
HGB BLD-MCNC: 13.2 G/DL (ref 11.5–15.4)
IMM GRANULOCYTES # BLD AUTO: 0.02 THOUSAND/UL (ref 0–0.2)
IMM GRANULOCYTES NFR BLD AUTO: 0 % (ref 0–2)
LYMPHOCYTES # BLD AUTO: 2.96 THOUSANDS/ÂΜL (ref 0.6–4.47)
LYMPHOCYTES NFR BLD AUTO: 36 % (ref 14–44)
MAGNESIUM SERPL-MCNC: 1.9 MG/DL (ref 1.9–2.7)
MCH RBC QN AUTO: 25.5 PG (ref 26.8–34.3)
MCHC RBC AUTO-ENTMCNC: 29.9 G/DL (ref 31.4–37.4)
MCV RBC AUTO: 86 FL (ref 82–98)
MONOCYTES # BLD AUTO: 0.51 THOUSAND/ÂΜL (ref 0.17–1.22)
MONOCYTES NFR BLD AUTO: 6 % (ref 4–12)
NEUTROPHILS # BLD AUTO: 4.48 THOUSANDS/ÂΜL (ref 1.85–7.62)
NEUTS SEG NFR BLD AUTO: 54 % (ref 43–75)
NRBC BLD AUTO-RTO: 0 /100 WBCS
P AXIS: 59 DEGREES
PLATELET # BLD AUTO: 318 THOUSANDS/UL (ref 149–390)
PMV BLD AUTO: 8.6 FL (ref 8.9–12.7)
POTASSIUM SERPL-SCNC: 3.8 MMOL/L (ref 3.5–5.3)
PR INTERVAL: 166 MS
QRS AXIS: 35 DEGREES
QRSD INTERVAL: 78 MS
QT INTERVAL: 434 MS
QTC INTERVAL: 407 MS
RBC # BLD AUTO: 5.17 MILLION/UL (ref 3.81–5.12)
SODIUM SERPL-SCNC: 138 MMOL/L (ref 135–147)
T WAVE AXIS: 53 DEGREES
TSH SERPL DL<=0.05 MIU/L-ACNC: 2.4 UIU/ML (ref 0.45–4.5)
VENTRICULAR RATE: 53 BPM
WBC # BLD AUTO: 8.26 THOUSAND/UL (ref 4.31–10.16)

## 2023-07-24 PROCEDURE — 99284 EMERGENCY DEPT VISIT MOD MDM: CPT

## 2023-07-24 PROCEDURE — 93005 ELECTROCARDIOGRAM TRACING: CPT

## 2023-07-24 PROCEDURE — 83735 ASSAY OF MAGNESIUM: CPT

## 2023-07-24 PROCEDURE — G1004 CDSM NDSC: HCPCS

## 2023-07-24 PROCEDURE — 36415 COLL VENOUS BLD VENIPUNCTURE: CPT

## 2023-07-24 PROCEDURE — 80048 BASIC METABOLIC PNL TOTAL CA: CPT

## 2023-07-24 PROCEDURE — 70450 CT HEAD/BRAIN W/O DYE: CPT

## 2023-07-24 PROCEDURE — 93010 ELECTROCARDIOGRAM REPORT: CPT | Performed by: INTERNAL MEDICINE

## 2023-07-24 PROCEDURE — 96360 HYDRATION IV INFUSION INIT: CPT

## 2023-07-24 PROCEDURE — 84443 ASSAY THYROID STIM HORMONE: CPT

## 2023-07-24 PROCEDURE — 85025 COMPLETE CBC W/AUTO DIFF WBC: CPT

## 2023-07-24 PROCEDURE — 96361 HYDRATE IV INFUSION ADD-ON: CPT

## 2023-07-24 RX ORDER — LIDOCAINE HYDROCHLORIDE 10 MG/ML
5 INJECTION, SOLUTION EPIDURAL; INFILTRATION; INTRACAUDAL; PERINEURAL ONCE
Status: COMPLETED | OUTPATIENT
Start: 2023-07-24 | End: 2023-07-24

## 2023-07-24 RX ADMIN — LIDOCAINE HYDROCHLORIDE 5 ML: 10 INJECTION, SOLUTION EPIDURAL; INFILTRATION; INTRACAUDAL at 03:23

## 2023-07-24 RX ADMIN — SODIUM CHLORIDE 1000 ML: 0.9 INJECTION, SOLUTION INTRAVENOUS at 03:20

## 2023-07-24 NOTE — ED PROVIDER NOTES
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 Patient reports last evening she was lying down, had some feelings of palpitations for a few seconds and reportedly got up to go to the bathroom. States at that time symptoms of palpitations resolved. Reports she went to the bathroom, upon standing from the toilet she had feelings of lightheadedness, "like I could pass out" and then patient subsequently had a syncopal episode. Patient states she woke up on the ground with a laceration to her forehead. States she may have hit her head on the sink/counter. No reported abnormal body movements, tongue biting or urinary self. No subsequent syncopal episodes or feelings of lightheadedness. No subsequent palpitations. Did contact her PCP in light of the laceration. Reports she went to sleep and woke up around midnight, saw a return message from her PCP advising her to come to the ED, prompting her visit tonight. Patient denies any other complaints before passing out other than the lightheadedness. Specifically denies chest pain, shortness of breath, palpitations, abdominal pain, back pain, headache, confusion and any other complaint. Reports she does have a headache now which she describes as bilateral and frontal.  Did take acetaminophen last night. No other pain medications PTA. No other complaints of injury or trauma. Reports she has otherwise been well and has no other complaints tonight. Has been taking her Eliquis as prescribed. States she is up-to-date on her tetanus. Denies alcohol and drug use.            Prior to Admission Medications Prescriptions Last Dose Informant Patient Reported? Taking?   b complex-C-E-zinc (STRESS FORMULA/ZINC) tablet   No No   Sig: Take 1 tablet by mouth daily   diphenhydrAMINE (BENADRYL) 25 mg tablet   No No   Sig: Take 2 tablets (50 mg total) by mouth every 6 (six) hours   erythromycin (ILOTYCIN) ophthalmic ointment   No No   Sig: Place a 1/2 inch ribbon of ointment into the lower eyelid. lidocaine (Lidoderm) 5 %   No No   Sig: Apply 1 patch topically over 12 hours daily for 14 days Remove & Discard patch within 12 hours or as directed by MD   methocarbamol (ROBAXIN) 500 mg tablet   No No   Sig: Take 1 tablet (500 mg total) by mouth 2 (two) times a day   ondansetron (ZOFRAN-ODT) 4 mg disintegrating tablet   No No   Sig: Take 1 tablet (4 mg total) by mouth every 8 (eight) hours as needed for nausea for up to 10 days   rivaroxaban (Xarelto) 15 mg tablet   No No   Sig: Take 1 tablet (15 mg total) by mouth daily with breakfast   Patient not taking: Reported on 10/3/2022   rivaroxaban (Xarelto) 20 mg tablet   No No   Sig: Take 1 tablet (20 mg total) by mouth daily with breakfast   Patient not taking: Reported on 10/27/2022      Facility-Administered Medications: None       Past Medical History:   Diagnosis Date   • Asthma    • Hx of blood clots    • Psoriasis    • Spinal stenosis        Past Surgical History:   Procedure Laterality Date   • APPENDECTOMY     • BACK SURGERY     •  SECTION      x 3   • HYSTERECTOMY  2015    Radical       No family history on file. I have reviewed and agree with the history as documented.     E-Cigarette/Vaping   • E-Cigarette Use Never User      E-Cigarette/Vaping Substances     Social History     Tobacco Use   • Smoking status: Former     Packs/day: 0.25     Types: Cigarettes   • Smokeless tobacco: Never   Vaping Use   • Vaping Use: Never used   Substance Use Topics   • Alcohol use: No   • Drug use: Not Currently     Types: Marijuana       Review of Systems   Constitutional: Negative for chills, diaphoresis, fatigue and fever. Respiratory: Negative for cough and shortness of breath. Cardiovascular: Positive for palpitations. Negative for chest pain and leg swelling. Gastrointestinal: Negative for abdominal pain, diarrhea, nausea and vomiting. Musculoskeletal: Negative for back pain and neck pain. Skin: Positive for wound. Neurological: Positive for syncope, light-headedness and headaches. Negative for seizures, speech difficulty and weakness. Psychiatric/Behavioral: Negative for confusion. All other systems reviewed and are negative. Physical Exam  Physical Exam  Vitals and nursing note reviewed. Constitutional:       General: She is not in acute distress. Appearance: She is well-developed. Comments: Awake, alert, interactive and resting in the stretcher in no acute distress. Patient is not ill or toxic appearing. HENT:      Head: Normocephalic. Comments: 2-3 cm vertical laceration over the forehead. Wound appears to be slightly gaping however there is a scab in place. Nonbleeding. Patient reports TTP over and around the wound. No bony depression, bony instability, hematoma, crepitus or any other associated findings. No other signs of injury or trauma over the head or face. Specifically no raccoon eyes, todd sign, hemotympanums, septal hematoma or CSF leak. No TTP along the mandible. Normal occlusion. Mouth/Throat:      Comments: MMM. Oropharynx patent and clear. Patient maintaining airway and secretions without issue. Eyes:      Conjunctiva/sclera: Conjunctivae normal.   Cardiovascular:      Rate and Rhythm: Normal rate and regular rhythm. Heart sounds: No murmur heard. Pulmonary:      Effort: Pulmonary effort is normal. No respiratory distress. Breath sounds: Normal breath sounds. Abdominal:      Palpations: Abdomen is soft. Tenderness: There is no abdominal tenderness.    Musculoskeletal:         General: No swelling. Cervical back: Neck supple. Comments: Normal muscle tone and moving all extremities without issue. No TTP of b/l UE or b/l LE. No deformity. Full, painless ROM intact b/l. No midline or paraspinal TTP, step-offs or deformity of C/T/L spine. No thoracic TTP. Pelvis is stable without pain. Sensation and motor intact in B/L face, B/L UE and B/L LE. Strength 5/5 in B/L face, B/L UE and B/L LE. Skin:     General: Skin is warm and dry. Capillary Refill: Capillary refill takes less than 2 seconds. Neurological:      General: No focal deficit present. Mental Status: She is alert and oriented to person, place, and time. GCS: GCS eye subscore is 4. GCS verbal subscore is 5. GCS motor subscore is 6. Cranial Nerves: Cranial nerves 2-12 are intact. Sensory: Sensation is intact. Motor: Motor function is intact. No abnormal muscle tone. Coordination: Coordination is intact. Finger-Nose-Finger Test and Heel to [place] Test normal.      Gait: Gait is intact.       Deep Tendon Reflexes: Reflexes normal.   Psychiatric:         Mood and Affect: Mood normal.         Vital Signs  ED Triage Vitals [07/24/23 0229]   Temperature Pulse Respirations Blood Pressure SpO2   98 °F (36.7 °C) 62 18 118/71 99 %      Temp src Heart Rate Source Patient Position - Orthostatic VS BP Location FiO2 (%)   -- Monitor Sitting Right arm --      Pain Score       8           Vitals:    07/24/23 0327 07/24/23 0328 07/24/23 0400 07/24/23 0430   BP: 120/73 121/79 103/62 102/55   Pulse: 59 74 61 55   Patient Position - Orthostatic VS: Sitting - Orthostatic VS Standing - Orthostatic VS Lying Lying         Visual Acuity      ED Medications  Medications   sodium chloride 0.9 % bolus 1,000 mL (0 mL Intravenous Stopped 7/24/23 0612)   lidocaine (PF) (XYLOCAINE-MPF) 1 % injection 5 mL (5 mL Infiltration Given by Other 7/24/23 0323)       Diagnostic Studies  Results Reviewed     Procedure Component Value Units Date/Time    TSH, 3rd generation with Free T4 reflex [[id number]]  (Normal) Collected: 07/24/23 0320    Lab Status: Final result Specimen: Blood from Arm, Left Updated: 07/24/23 0406     TSH 3RD GENERATON 2.399 uIU/mL     Basic metabolic panel [[id number]] Collected: 07/24/23 0320    Lab Status: Final result Specimen: Blood from Arm, Left Updated: 07/24/23 0354     Sodium 138 mmol/L      Potassium 3.8 mmol/L      Chloride 107 mmol/L      CO2 27 mmol/L      ANION GAP 4 mmol/L      BUN 12 mg/dL      Creatinine 0.74 mg/dL      Glucose 83 mg/dL      Calcium 8.8 mg/dL      eGFR 98 ml/min/1.73sq m     Narrative:      [place] guidelines for Chronic Kidney Disease (CKD):   •  Stage 1 with normal or high GFR (GFR > 90 mL/min/1.73 square meters)  •  Stage 2 Mild CKD (GFR = 60-89 mL/min/1.73 square meters)  •  Stage 3A Moderate CKD (GFR = 45-59 mL/min/1.73 square meters)  •  Stage 3B Moderate CKD (GFR = 30-44 mL/min/1.73 square meters)  •  Stage 4 Severe CKD (GFR = 15-29 mL/min/1.73 square meters)  •  Stage 5 End Stage CKD (GFR <15 mL/min/1.73 square meters)  Note: GFR calculation is accurate only with a steady state creatinine    Magnesium [[id number]]  (Normal) Collected: 07/24/23 0320    Lab Status: Final result Specimen: Blood from Arm, Left Updated: 07/24/23 0353     Magnesium 1.9 mg/dL     CBC and differential [[id number]]  (Abnormal) Collected: 07/24/23 0320    Lab Status: Final result Specimen: Blood from Arm, Left Updated: 07/24/23 0336     WBC 8.26 Thousand/uL      RBC 5.17 Million/uL      Hemoglobin 13.2 g/dL      Hematocrit 44.2 %      MCV 86 fL      MCH 25.5 pg      MCHC 29.9 g/dL      RDW 14.4 %      MPV 8.6 fL      Platelets 319 Thousands/uL      nRBC 0 /100 WBCs      Neutrophils Relative 54 %      Immat GRANS % 0 %      Lymphocytes Relative 36 %      Monocytes Relative 6 %      Eosinophils Relative 3 %      Basophils Relative 1 %      Neutrophils Absolute 4.48 Thousands/µL      Immature Grans Absolute 0.02 Thousand/uL      Lymphocytes Absolute 2.96 Thousands/µL      Monocytes Absolute 0.51 Thousand/µL      Eosinophils Absolute 0.23 Thousand/µL      Basophils Absolute 0.06 Thousands/µL                  CT head without contrast   Final Result by Terrence Garcia MD (07/24 0515)      No acute intracranial abnormality. Workstation performed: [id number]                    Procedures  ECG 12 Lead Documentation Only    Date/Time: 7/24/2023 4:12 AM    Performed by: Rosa Velazquez PA-C  Authorized by: Rosa Velazquez PA-C    Indications / Diagnosis:  Palpitations, syncope. ECG reviewed by me, the ED Provider: yes    Patient location:  ED  Interpretation:     Interpretation: normal    Rate:     ECG rate:  53    ECG rate assessment: bradycardic    Rhythm:     Rhythm: sinus bradycardia    Ectopy:     Ectopy: none    QRS:     QRS axis:  Normal  Conduction:     Conduction: normal    ST segments:     ST segments:  Normal  T waves:     T waves: normal      Universal Protocol:  Consent: Verbal consent obtained. Risks and benefits: risks, benefits and alternatives were discussed  Consent given by: patient  Patient understanding: patient states understanding of the procedure being performed    Laceration repair    Date/Time: 7/24/2023 5:01 AM    Performed by: Rosa Velazquez PA-C  Authorized by: Christian Aquino PA-C  Body area: head/neck  Location details: forehead  Laceration length: 2 cm  Foreign bodies: no foreign bodies  Tendon involvement: none  Nerve involvement: none  Vascular damage: no  Anesthesia method: none. Sedation:  Patient sedated: no        Procedure Details:  Preparation: Patient was prepped and draped in the usual sterile fashion.   Irrigation solution: saline  Irrigation method: syringe  Amount of cleaning: standard  Debridement: none  Skin closure: glue and Steri-Strips  Approximation: close  Approximation difficulty: simple  Patient tolerance: patient tolerated the procedure well with no immediate complications               ED Course  ED Course as of 07/27/23 1301   Mon Jul 24, 2023   0413 WBC: 8.26   0413 Hemoglobin: 13.2   2651 Basic metabolic panel  WNL   0669 Magnesium: 1.9   0413 TSH 3RD GENERATON: 2.399   0548 IMPRESSION:     No acute intracranial abnormality.      0548 On bedside reevaluation patient resting company in stretcher no acute distress. No complaints this time. No recurrent lightheadedness or syncope. States she feels well to go home. Has been able to maintain p.o. intake while in the ED. Laceration of forehead repaired without issue with glue and Steri-Strips. Overall very superficial laceration. Supportive care and follow-up as outlined in the AVS.  Strict return precautions verbally communicated to the patient as outlined in the AVS.  All patient questions and concerns were answered. Patient verbally communicated their understanding and agreement to the above plan. Patient stable at discharge. Portions of the record may have been created with voice recognition software. Occasional wrong word or "sound a like" substitutions may have occurred due to the inherent limitations of voice recognition software. Read the chart carefully and recognize, using context, where substitutions have occurred. Medical Decision Making  49-year-old female presented to ED with complaint of a syncopal episode and now complains of a headache and laceration of the forehead. Patient reports she had a few seconds of palpitations while laying down. Subsequently got up to go the bathroom and symptoms resolved. States after standing from sitting on the toilet she had feelings of lightheadedness and subsequently had a syncopal episode in the bathroom. Patient believes she may have hit her head on the counter or sink. Did have LOC. There is no reported abnormal body movements.   States she then went to bed and upon waking she had a message from her PCP advising her to come ED in light of fall on blood thinners and laceration. Patient's only complaint at this time is a mild headache and pain associated with her laceration on her forehead. Reports prior to this evening she has otherwise been feeling well. Has no other specific complaints today. Is up-to-date on her tetanus. On exam patient is a well-appearing 41-year-old female resting in the stretcher in no acute distress. VSS. Does have a minor laceration over the forehead as described in the PE section. No other signs of injury or trauma over the head or face. PE otherwise unremarkable. No other signs of injury or trauma on exam. NRRR, no murmur appreciated. Lungs CTA B/L. Abdomen is soft, nondistended and nontender. No other concerning muscular skeletal findings or associated TTP. A&O x3. GCS 15.  CN II- XII intact. No focal neurologic deficits. Differential diagnosis including but not limited to: vasovagal syncope, cardiac arrhythmia, metabolic abnormality, dehydration, or anemia. Doubt seizure, intracranial process, MI, ACS, PE, or GI bleed. In light of patient's presenting complaints will check a CT head to rule out intracranial abnormality in light of patient on blood thinners with head strike and a complaint of headache. Will also check an EKG to rule out arrhythmia as possible cause of patient's complaints. Patient without complaints of chest pain or shortness of breath, as such ACS/MI/PE unlikely at this time. Will check a CBC for signs of anemia. BMP for electrolytes and renal function. In light of episode of palpitations we will check a TSH and magnesium. Patient will likely require sutures over the forehead. Disposition pending above-stated plan and reevaluation.           Laceration of forehead, initial encounter: acute illness or injury  Minor head injury, initial encounter: acute illness or injury  Palpitations: acute illness or injury  Syncope: acute illness or injury  Amount and/or Complexity of Data Reviewed  Labs: ordered. Decision-making details documented in ED Course. Radiology: ordered. Risk  Prescription drug management. Disposition  Final diagnoses:   Syncope   Laceration of forehead, initial encounter   Minor head injury, initial encounter   Palpitations     Time reflects when diagnosis was documented in both MDM as applicable and the Disposition within this note     Time User Action Codes Description Comment    7/24/2023  5:49 AM Latonya Girard [R55] Syncope     7/24/2023  5:49 AM Laura Aquino Add [S01.81XA] Laceration of forehead, initial encounter     7/24/2023  5:49 AM Laura Aquino Add [S09.90XA] Minor head injury, initial encounter     7/24/2023  5:49 AM Latonya Quinn Add [R00.2] Palpitations       ED Disposition     ED Disposition   Discharge    Condition   Stable    Date/Time   Mon Jul 24, 2023  5:49 AM    Comment   [id number] [address] [address] discharge to home/self care. Follow-up Information     Follow up With Specialties Details Why [facility] Emergency Medicine Go to  As needed, If symptoms worsen [address] [id number]  [facility] Department, [address] [place] [address], [id number]          Discharge Medication List as of 7/24/2023  5:55 AM      CONTINUE these medications which have NOT CHANGED    Details   b complex-C-E-zinc (STRESS FORMULA/ZINC) tablet Take 1 tablet by mouth daily, Starting Thu 10/27/2022, Until Sat 11/26/2022, Normal      diphenhydrAMINE (BENADRYL) 25 mg tablet Take 2 tablets (50 mg total) by mouth every 6 (six) hours, Starting Mon 4/3/2023, Normal      erythromycin (ILOTYCIN) ophthalmic ointment Place a 1/2 inch ribbon of ointment into the lower eyelid. , Normal      lidocaine (Lidoderm) 5 % Apply 1 patch topically over 12 hours daily for 14 days Remove & Discard patch within 12 hours or as directed by MD, Starting Thu 1/26/2023, Until Thu 2/9/2023, Normal      methocarbamol (ROBAXIN) 500 mg tablet Take 1 tablet (500 mg total) by mouth 2 (two) times a day, Starting Thu 1/26/2023, Normal      ondansetron (ZOFRAN-ODT) 4 mg disintegrating tablet Take 1 tablet (4 mg total) by mouth every 8 (eight) hours as needed for nausea for up to 10 days, Starting Wed 3/1/2023, Until Sat 3/11/2023 at 2359, Normal      !! rivaroxaban (Xarelto) 15 mg tablet Take 1 tablet (15 mg total) by mouth daily with breakfast, Starting Mon 9/19/2022, Normal      !! rivaroxaban (Xarelto) 20 mg tablet Take 1 tablet (20 mg total) by mouth daily with breakfast, Starting Mon 9/19/2022, Normal       !! - Potential duplicate medications found. Please discuss with provider. No discharge procedures on file.     PDMP Review     None          ED Provider  Electronically Signed by           Bina Tang PA-C  07/27/23 2025

## 2023-07-24 NOTE — DISCHARGE INSTRUCTIONS
Please return to the ED for any concerns as outlined in the AVS or for any other concerns. Please follow-up with your primary care provider in 2 days for re-evaluation and further management. Continue to keep the wound clean and dry. Steri-Strips and glue should fall off in about 5 days, allow them to fall off on their own. Continue acetaminophen as needed pain control. Continue stay well-hydrated. Transition slowly when going from lying or sitting to standing.

## 2023-07-24 NOTE — Clinical Note
Gisell Arthur was seen and treated in our emergency department on 7/24/2023. Diagnosis:     Soy Lamar  . She may return on this date: If you have any questions or concerns, please don't hesitate to call.       Florida Jauregui PA-C    ______________________________           _______________          _______________  Hospital Representative                              Date                                Time

## 2023-10-01 ENCOUNTER — HOSPITAL ENCOUNTER (EMERGENCY)
Facility: HOSPITAL | Age: 43
Discharge: HOME | End: 2023-10-01
Attending: EMERGENCY MEDICINE
Payer: COMMERCIAL

## 2023-10-01 ENCOUNTER — APPOINTMENT (EMERGENCY)
Dept: CT IMAGING | Facility: HOSPITAL | Age: 43
End: 2023-10-01
Payer: COMMERCIAL

## 2023-10-01 ENCOUNTER — APPOINTMENT (EMERGENCY)
Dept: RADIOLOGY | Facility: HOSPITAL | Age: 43
End: 2023-10-01
Payer: COMMERCIAL

## 2023-10-01 VITALS
SYSTOLIC BLOOD PRESSURE: 106 MMHG | DIASTOLIC BLOOD PRESSURE: 72 MMHG | OXYGEN SATURATION: 96 % | WEIGHT: 265.65 LBS | TEMPERATURE: 98 F | BODY MASS INDEX: 44.21 KG/M2 | HEART RATE: 50 BPM | RESPIRATION RATE: 12 BRPM

## 2023-10-01 DIAGNOSIS — N76.0 ACUTE VAGINITIS: ICD-10-CM

## 2023-10-01 DIAGNOSIS — B96.89 BACTERIAL VAGINOSIS: ICD-10-CM

## 2023-10-01 DIAGNOSIS — N76.0 BACTERIAL VAGINOSIS: ICD-10-CM

## 2023-10-01 DIAGNOSIS — J18.9 PNEUMONIA OF RIGHT LOWER LOBE DUE TO INFECTIOUS ORGANISM: Primary | ICD-10-CM

## 2023-10-01 LAB
ALBUMIN SERPL BCP-MCNC: 3.8 G/DL (ref 3.5–5)
ALP SERPL-CCNC: 64 U/L (ref 34–104)
ALT SERPL W P-5'-P-CCNC: 12 U/L (ref 7–52)
ANION GAP SERPL CALCULATED.3IONS-SCNC: 6 MMOL/L
AST SERPL W P-5'-P-CCNC: 15 U/L (ref 13–39)
ATRIAL RATE: 59 BPM
BASOPHILS # BLD AUTO: 0.06 THOUSANDS/ÂΜL (ref 0–0.1)
BASOPHILS NFR BLD AUTO: 1 % (ref 0–1)
BILIRUB SERPL-MCNC: 0.49 MG/DL (ref 0.2–1)
BILIRUB UR QL STRIP: NEGATIVE
BUN SERPL-MCNC: 13 MG/DL (ref 5–25)
CALCIUM SERPL-MCNC: 8.7 MG/DL (ref 8.4–10.2)
CARDIAC TROPONIN I PNL SERPL HS: 2 NG/L
CHLORIDE SERPL-SCNC: 105 MMOL/L (ref 96–108)
CLARITY UR: ABNORMAL
CO2 SERPL-SCNC: 25 MMOL/L (ref 21–32)
COLOR UR: YELLOW
CREAT SERPL-MCNC: 0.76 MG/DL (ref 0.6–1.3)
D DIMER PPP FEU-MCNC: 0.65 UG/ML FEU
EOSINOPHIL # BLD AUTO: 0.27 THOUSAND/ÂΜL (ref 0–0.61)
EOSINOPHIL NFR BLD AUTO: 3 % (ref 0–6)
ERYTHROCYTE [DISTWIDTH] IN BLOOD BY AUTOMATED COUNT: 15 % (ref 11.6–15.1)
FLUAV RNA RESP QL NAA+PROBE: NEGATIVE
FLUBV RNA RESP QL NAA+PROBE: NEGATIVE
GFR SERPL CREATININE-BSD FRML MDRD: 95 ML/MIN/1.73SQ M
GLUCOSE SERPL-MCNC: 96 MG/DL (ref 65–140)
GLUCOSE UR STRIP-MCNC: NEGATIVE MG/DL
HCT VFR BLD AUTO: 44.8 % (ref 34.8–46.1)
HGB BLD-MCNC: 13.5 G/DL (ref 11.5–15.4)
HGB UR QL STRIP.AUTO: NEGATIVE
HIV 1+2 AB+HIV1 P24 AG SERPL QL IA: NORMAL
HIV1 P24 AG SER QL: NORMAL
IMM GRANULOCYTES # BLD AUTO: 0.03 THOUSAND/UL (ref 0–0.2)
IMM GRANULOCYTES NFR BLD AUTO: 0 % (ref 0–2)
KETONES UR STRIP-MCNC: NEGATIVE MG/DL
LEUKOCYTE ESTERASE UR QL STRIP: NEGATIVE
LIPASE SERPL-CCNC: <6 U/L (ref 11–82)
LYMPHOCYTES # BLD AUTO: 2.3 THOUSANDS/ÂΜL (ref 0.6–4.47)
LYMPHOCYTES NFR BLD AUTO: 26 % (ref 14–44)
MCH RBC QN AUTO: 25.4 PG (ref 26.8–34.3)
MCHC RBC AUTO-ENTMCNC: 30.1 G/DL (ref 31.4–37.4)
MCV RBC AUTO: 84 FL (ref 82–98)
MONOCYTES # BLD AUTO: 0.61 THOUSAND/ÂΜL (ref 0.17–1.22)
MONOCYTES NFR BLD AUTO: 7 % (ref 4–12)
NEUTROPHILS # BLD AUTO: 5.47 THOUSANDS/ÂΜL (ref 1.85–7.62)
NEUTS SEG NFR BLD AUTO: 63 % (ref 43–75)
NITRITE UR QL STRIP: NEGATIVE
NRBC BLD AUTO-RTO: 0 /100 WBCS
P AXIS: 51 DEGREES
PH UR STRIP.AUTO: 7 [PH]
PLATELET # BLD AUTO: 309 THOUSANDS/UL (ref 149–390)
PMV BLD AUTO: 8.9 FL (ref 8.9–12.7)
POTASSIUM SERPL-SCNC: 4.3 MMOL/L (ref 3.5–5.3)
PR INTERVAL: 170 MS
PROT SERPL-MCNC: 6.9 G/DL (ref 6.4–8.4)
PROT UR STRIP-MCNC: NEGATIVE MG/DL
QRS AXIS: 22 DEGREES
QRSD INTERVAL: 90 MS
QT INTERVAL: 422 MS
QTC INTERVAL: 417 MS
RBC # BLD AUTO: 5.32 MILLION/UL (ref 3.81–5.12)
RSV RNA RESP QL NAA+PROBE: NEGATIVE
SARS-COV-2 RNA RESP QL NAA+PROBE: NEGATIVE
SODIUM SERPL-SCNC: 136 MMOL/L (ref 135–147)
SP GR UR STRIP.AUTO: 1.01 (ref 1–1.04)
T WAVE AXIS: 59 DEGREES
UROBILINOGEN UA: NEGATIVE MG/DL
VENTRICULAR RATE: 59 BPM
WBC # BLD AUTO: 8.74 THOUSAND/UL (ref 4.31–10.16)

## 2023-10-01 PROCEDURE — 36415 COLL VENOUS BLD VENIPUNCTURE: CPT

## 2023-10-01 PROCEDURE — 87563 M. GENITALIUM AMP PROBE: CPT

## 2023-10-01 PROCEDURE — 83690 ASSAY OF LIPASE: CPT

## 2023-10-01 PROCEDURE — 85379 FIBRIN DEGRADATION QUANT: CPT

## 2023-10-01 PROCEDURE — 80053 COMPREHEN METABOLIC PANEL: CPT

## 2023-10-01 PROCEDURE — 81003 URINALYSIS AUTO W/O SCOPE: CPT

## 2023-10-01 PROCEDURE — 81514 NFCT DS BV&VAGINITIS DNA ALG: CPT

## 2023-10-01 PROCEDURE — 96361 HYDRATE IV INFUSION ADD-ON: CPT

## 2023-10-01 PROCEDURE — 96360 HYDRATION IV INFUSION INIT: CPT

## 2023-10-01 PROCEDURE — 85025 COMPLETE CBC W/AUTO DIFF WBC: CPT

## 2023-10-01 PROCEDURE — 87661 TRICHOMONAS VAGINALIS AMPLIF: CPT

## 2023-10-01 PROCEDURE — 87491 CHLMYD TRACH DNA AMP PROBE: CPT

## 2023-10-01 PROCEDURE — 93005 ELECTROCARDIOGRAM TRACING: CPT

## 2023-10-01 PROCEDURE — 71046 X-RAY EXAM CHEST 2 VIEWS: CPT

## 2023-10-01 PROCEDURE — 99284 EMERGENCY DEPT VISIT MOD MDM: CPT | Performed by: EMERGENCY MEDICINE

## 2023-10-01 PROCEDURE — 96372 THER/PROPH/DIAG INJ SC/IM: CPT

## 2023-10-01 PROCEDURE — 0241U HB NFCT DS VIR RESP RNA 4 TRGT: CPT

## 2023-10-01 PROCEDURE — 99285 EMERGENCY DEPT VISIT HI MDM: CPT

## 2023-10-01 PROCEDURE — 87806 HIV AG W/HIV1&2 ANTB W/OPTIC: CPT

## 2023-10-01 PROCEDURE — 87591 N.GONORRHOEAE DNA AMP PROB: CPT

## 2023-10-01 PROCEDURE — G1004 CDSM NDSC: HCPCS

## 2023-10-01 PROCEDURE — 71275 CT ANGIOGRAPHY CHEST: CPT

## 2023-10-01 PROCEDURE — 93010 ELECTROCARDIOGRAM REPORT: CPT | Performed by: INTERNAL MEDICINE

## 2023-10-01 PROCEDURE — 84484 ASSAY OF TROPONIN QUANT: CPT

## 2023-10-01 RX ORDER — FLUCONAZOLE 100 MG/1
150 TABLET ORAL ONCE
Status: COMPLETED | OUTPATIENT
Start: 2023-10-01 | End: 2023-10-01

## 2023-10-01 RX ORDER — ACETAMINOPHEN 325 MG/1
975 TABLET ORAL ONCE
Status: COMPLETED | OUTPATIENT
Start: 2023-10-01 | End: 2023-10-01

## 2023-10-01 RX ORDER — ONDANSETRON 2 MG/ML
4 INJECTION INTRAMUSCULAR; INTRAVENOUS ONCE
Status: DISCONTINUED | OUTPATIENT
Start: 2023-10-01 | End: 2023-10-01 | Stop reason: HOSPADM

## 2023-10-01 RX ORDER — AZITHROMYCIN 250 MG/1
TABLET, FILM COATED ORAL
Qty: 6 TABLET | Refills: 0 | Status: SHIPPED | OUTPATIENT
Start: 2023-10-01 | End: 2023-10-05

## 2023-10-01 RX ORDER — AZITHROMYCIN 250 MG/1
2000 TABLET, FILM COATED ORAL ONCE
Status: COMPLETED | OUTPATIENT
Start: 2023-10-01 | End: 2023-10-01

## 2023-10-01 RX ORDER — GENTAMICIN SULFATE 40 MG/ML
240 INJECTION, SOLUTION INTRAMUSCULAR; INTRAVENOUS ONCE
Status: COMPLETED | OUTPATIENT
Start: 2023-10-01 | End: 2023-10-01

## 2023-10-01 RX ORDER — FLUCONAZOLE 150 MG/1
150 TABLET ORAL ONCE
Qty: 1 TABLET | Refills: 0 | Status: SHIPPED | OUTPATIENT
Start: 2023-10-01 | End: 2023-10-01

## 2023-10-01 RX ADMIN — SODIUM CHLORIDE 500 ML: 0.9 INJECTION, SOLUTION INTRAVENOUS at 09:14

## 2023-10-01 RX ADMIN — FLUCONAZOLE 150 MG: 100 TABLET ORAL at 13:56

## 2023-10-01 RX ADMIN — AZITHROMYCIN DIHYDRATE 2000 MG: 250 TABLET ORAL at 13:55

## 2023-10-01 RX ADMIN — GENTAMICIN SULFATE 240 MG: 40 INJECTION, SOLUTION INTRAMUSCULAR; INTRAVENOUS at 14:00

## 2023-10-01 RX ADMIN — IOHEXOL 100 ML: 350 INJECTION, SOLUTION INTRAVENOUS at 11:30

## 2023-10-01 RX ADMIN — ACETAMINOPHEN 975 MG: 325 TABLET ORAL at 09:00

## 2023-10-01 NOTE — ED ATTENDING ATTESTATION
10/1/2023  IEllis DO, saw and evaluated the patient. I have discussed the patient with the resident/non-physician practitioner and agree with the resident's/non-physician practitioner's findings, Plan of Care, and MDM as documented in the resident's/non-physician practitioner's note, except where noted. All available labs and Radiology studies were reviewed. I was present for key portions of any procedure(s) performed by the resident/non-physician practitioner and I was immediately available to provide assistance. At this point I agree with the current assessment done in the Emergency Department. I have conducted an independent evaluation of this patient a history and physical is as follows:    ED Course     24-year-old female, presents for history and exam as noted in resident note, and agree with H&P. Complex medical history with PE, IVC filter, currently anticoagulated. Symptoms were concerning for possible failure with anticoagulation, CTA shows that she has chronic issues with lower lobe groundglass appearance today. Will cover with antibiotics, also requesting STD prophylaxis. Patient is allergic to Rocephin, will cover with 2 g of Zithromax. General: VSS, NAD  Head: NCAT, NT. Eyes: PERRL, EOMI.   ENT: MMM, Pharynx normal.   Neck: Trachea midline. No JVD. CV: RRR. No M/R/G. Lungs: CTAB,  No wheezes, rales. Abd: +BS, soft, NT/ND. No guarding/rebound   MSK: Full ROM B/L UE/LE. No lower extremity edema. Back: No C/T/L-spine tenderness. Skin: Dry, intact. No abrasions, lacerations. Neuro: AAOx3, GCS 15, CN II-XII grossly intact. Motor/sensory grossly intact.         Critical Care Time  Procedures

## 2023-10-01 NOTE — Clinical Note
Georges Goldmann was seen and treated in our emergency department on 10/1/2023. Diagnosis:     Jennifer Pablo  . She may return on this date: 10/02/2023         If you have any questions or concerns, please don't hesitate to call.       Haley Boyle MD    ______________________________           _______________          _______________  Hospital Representative                              Date                                Time

## 2023-10-01 NOTE — ED PROVIDER NOTES
History  Chief Complaint   Patient presents with   • Urinary Urgency     Started 1 week ago. Pt stated that she also d/c that is beige in color with fould odor. • Flank Pain     Right side lower flank pain   • Headache     2 days, tylenol at home w/o relief   • Shortness of Breath     Started 2 days ago, HO blood clots w/filter     40-year-old female with history of asthma, psoriasis, PE, DVT presents with shortness of breath and vaginal discharge. Patient states 2-day history of congestion, sinus pressure, headache, exertional dyspnea. Has been attempting Tylenol, Excedrin without relief. Previous headaches in the past which she believes is secondary to nasal congestion. Headache is pressure-like, frontal, over sinuses. Has not taken anything this morning for relief. Previous history of DVT and PE.  IV C filter in 2014. Currently on Eliquis. Bilateral leg swelling. Denies palpitations, recent long travel, history of cancer, recent surgery, hemoptysis. Patient states did not feel similar to previous PE symptoms, but has had VTE on eliquis. Patient is also endorsing 1 week history of right flank pain, abnormal beige vaginal discharge, foul smelling urine. States sexually active and that her partner had recently cheated on her. Previous hysterectomy, does not get menses. Currently wishes for STD testing. Rash on the buttock. Denies vaginal lesions, vesicles, or itch. No relief w/ monostat. Associated chills. Denies fevers, sore throat, cough, chest pains, abdominal pain, nausea, vomiting, dysuria, frequency. Prior to Admission Medications   Prescriptions Last Dose Informant Patient Reported?  Taking?   b complex-C-E-zinc (STRESS FORMULA/ZINC) tablet   No No   Sig: Take 1 tablet by mouth daily      Facility-Administered Medications: None       Past Medical History:   Diagnosis Date   • Asthma    • Hx of blood clots    • Psoriasis    • Spinal stenosis        Past Surgical History:   Procedure Laterality Date   • APPENDECTOMY     • BACK SURGERY     •  SECTION      x 3   • HYSTERECTOMY  2015    Radical       History reviewed. No pertinent family history. I have reviewed and agree with the history as documented. E-Cigarette/Vaping   • E-Cigarette Use Never User      E-Cigarette/Vaping Substances     Social History     Tobacco Use   • Smoking status: Former     Packs/day: 0.25     Types: Cigarettes   • Smokeless tobacco: Never   Vaping Use   • Vaping Use: Never used   Substance Use Topics   • Alcohol use: No   • Drug use: Not Currently     Types: Marijuana        Review of Systems   All other systems reviewed and are negative. Physical Exam  ED Triage Vitals   Temperature Pulse Respirations Blood Pressure SpO2   10/01/23 0847 10/01/23 0847 10/01/23 0847 10/01/23 0847 10/01/23 0847   98 °F (36.7 °C) 67 (!) 25 114/66 96 %      Temp Source Heart Rate Source Patient Position - Orthostatic VS BP Location FiO2 (%)   10/01/23 0847 10/01/23 0847 10/01/23 0847 10/01/23 0847 --   Tympanic Monitor Sitting Left arm       Pain Score       10/01/23 0900       7             Orthostatic Vital Signs  Vitals:    10/01/23 0847 10/01/23 0935 10/01/23 1105 10/01/23 1200   BP: 114/66 91/61 109/57 106/72   Pulse: 67 56 (!) 52 (!) 50   Patient Position - Orthostatic VS: Sitting Sitting Lying Lying       Physical Exam  Vitals and nursing note reviewed. Constitutional:       General: She is not in acute distress. Appearance: Normal appearance. She is well-developed. She is obese. She is not ill-appearing, toxic-appearing or diaphoretic. HENT:      Head: Normocephalic and atraumatic. Right Ear: External ear normal.      Left Ear: External ear normal.      Nose: Nose normal.      Mouth/Throat:      Mouth: Mucous membranes are moist.      Pharynx: Oropharynx is clear. No pharyngeal swelling or oropharyngeal exudate. Eyes:      General: No scleral icterus. Right eye: No discharge.          Left eye: No discharge. Extraocular Movements: Extraocular movements intact. Neck:      Vascular: No JVD. Trachea: No tracheal deviation. Cardiovascular:      Rate and Rhythm: Normal rate and regular rhythm. Pulses: Normal pulses. Heart sounds: Normal heart sounds. No murmur heard. No friction rub. No gallop. Pulmonary:      Effort: Pulmonary effort is normal. No bradypnea, accessory muscle usage or respiratory distress. Breath sounds: Normal breath sounds. No stridor. No decreased breath sounds, wheezing, rhonchi or rales. Chest:      Chest wall: No mass, deformity, tenderness, crepitus or edema. Abdominal:      Palpations: Abdomen is soft. There is no hepatomegaly, splenomegaly, mass or pulsatile mass. Tenderness: There is no abdominal tenderness. There is right CVA tenderness. There is no left CVA tenderness, guarding or rebound. Musculoskeletal:         General: Normal range of motion. Cervical back: Normal range of motion and neck supple. Right lower leg: No tenderness. Edema present. Left lower leg: No tenderness. Edema present. Lymphadenopathy:      Cervical: No cervical adenopathy. Skin:     General: Skin is warm and dry. Capillary Refill: Capillary refill takes less than 2 seconds. Coloration: Skin is not cyanotic, jaundiced or pale. Findings: No bruising, ecchymosis, erythema, lesion, petechiae or rash. Nails: There is no clubbing. Neurological:      General: No focal deficit present. Mental Status: She is alert and oriented to person, place, and time. Mental status is at baseline.    Psychiatric:         Mood and Affect: Mood normal.         Behavior: Behavior normal.         ED Medications  Medications   azithromycin (ZITHROMAX) tablet 2,000 mg (has no administration in time range)   gentamicin (GARAMYCIN) 40 mg/mL injection 240 mg (has no administration in time range)   ondansetron (ZOFRAN) injection 4 mg (4 mg Intravenous Not Given 10/1/23 1334)   fluconazole (DIFLUCAN) tablet 150 mg (has no administration in time range)   sodium chloride 0.9 % bolus 500 mL (0 mL Intravenous Stopped 10/1/23 1110)   acetaminophen (TYLENOL) tablet 975 mg (975 mg Oral Given 10/1/23 0900)   iohexol (OMNIPAQUE) 350 MG/ML injection (SINGLE-DOSE) 100 mL (100 mL Intravenous Given 10/1/23 1130)       Diagnostic Studies  Results Reviewed     Procedure Component Value Units Date/Time    Molecular Vaginal Panel [[id number]] Collected: 10/01/23 1032    Lab Status: In process Specimen: Genital from Vaginal Updated: 10/01/23 1039    RAPID HIV 1/2 AB-AG COMBO for 15years old and above [[id number]]  (Normal) Collected: 10/01/23 0907    Lab Status: Final result Specimen: Blood from Arm, Left Updated: 10/01/23 1014     Rapid HIV 1 AND 2 Non-Reactive     HIV-1 P24 Ag Screen Non-Reactive    Narrative:      Negative for HIV-1 p24 Antigen. Negative for HIV-1 and/or HIV-2 Antibody.     D-Dimer [[id number]]  (Abnormal) Collected: 10/01/23 0935    Lab Status: Final result Specimen: Blood from Arm, Left Updated: 10/01/23 1012     D-Dimer, Quant 0.65 ug/ml FEU     UA w Reflex to Microscopic w Reflex to Culture [[id number]]  (Abnormal) Collected: 10/01/23 0906    Lab Status: Final result Specimen: Urine, Clean Catch Updated: 10/01/23 0959     Color, UA Yellow     Clarity, UA Slightly Cloudy     Specific Gravity, UA 1.010     pH, UA 7.0     Leukocytes, UA Negative     Nitrite, UA Negative     Protein, UA Negative mg/dl      Glucose, UA Negative mg/dl      Ketones, UA Negative mg/dl      Bilirubin, UA Negative     Occult Blood, UA Negative     UROBILINOGEN UA Negative mg/dL     FLU/RSV/COVID - if FLU/RSV clinically relevant [[id number]]  (Normal) Collected: 10/01/23 0903    Lab Status: Final result Specimen: Nares from Nose Updated: 10/01/23 0951     SARS-CoV-2 Negative     INFLUENZA A PCR Negative     INFLUENZA B PCR Negative     RSV PCR Negative    Narrative:      FOR PEDIATRIC PATIENTS - copy/paste COVID Guidelines URL to browser: [email or web address]    SARS-CoV-2 assay is a Nucleic Acid Amplification assay intended for the  qualitative detection of nucleic acid from SARS-CoV-2 in nasopharyngeal  swabs. Results are for the presumptive identification of SARS-CoV-2 RNA. Positive results are indicative of infection with SARS-CoV-2, the virus  causing COVID-19, but do not rule out bacterial infection or co-infection  with other viruses. Laboratories within the [facility] and its  territories are required to report all positive results to the appropriate  public health authorities. Negative results do not preclude SARS-CoV-2  infection and should not be used as the sole basis for treatment or other  patient management decisions. Negative results must be combined with  clinical observations, patient history, and epidemiological information. This test has not been FDA cleared or approved. This test has been authorized by FDA under an Emergency Use Authorization  (EUA). This test is only authorized for the duration of time the  declaration that circumstances exist justifying the authorization of the  emergency use of an in vitro diagnostic tests for detection of SARS-CoV-2  virus and/or diagnosis of COVID-19 infection under section 564(b)(1) of  the Act, 21 U. S.C. 846OLW-5(Q)(8), unless the authorization is terminated  or revoked sooner. The test has been validated but independent review by FDA  and CLIA is pending. Test performed using its learning GeneXpert: This RT-PCR assay targets N2,  a region unique to SARS-CoV-2. A conserved region in the E-gene was chosen  for pan-Sarbecovirus detection which includes SARS-CoV-2. According to CMS-2020-01-R, this platform meets the definition of high-throughput technology.     Comprehensive metabolic panel [[id number]] Collected: 10/01/23 6957    Lab Status: Final result Specimen: Blood from Arm, Left Updated: 10/01/23 0948     Sodium 136 mmol/L      Potassium 4.3 mmol/L      Chloride 105 mmol/L      CO2 25 mmol/L      ANION GAP 6 mmol/L      BUN 13 mg/dL      Creatinine 0.76 mg/dL      Glucose 96 mg/dL      Calcium 8.7 mg/dL      AST 15 U/L      ALT 12 U/L      Alkaline Phosphatase 64 U/L      Total Protein 6.9 g/dL      Albumin 3.8 g/dL      Total Bilirubin 0.49 mg/dL      eGFR 95 ml/min/1.73sq m     Narrative:      National Kidney Disease Foundation guidelines for Chronic Kidney Disease (CKD):   •  Stage 1 with normal or high GFR (GFR > 90 mL/min/1.73 square meters)  •  Stage 2 Mild CKD (GFR = 60-89 mL/min/1.73 square meters)  •  Stage 3A Moderate CKD (GFR = 45-59 mL/min/1.73 square meters)  •  Stage 3B Moderate CKD (GFR = 30-44 mL/min/1.73 square meters)  •  Stage 4 Severe CKD (GFR = 15-29 mL/min/1.73 square meters)  •  Stage 5 End Stage CKD (GFR <15 mL/min/1.73 square meters)  Note: GFR calculation is accurate only with a steady state creatinine    Lipase [[id number]]  (Abnormal) Collected: 10/01/23 0907    Lab Status: Final result Specimen: Blood from Arm, Left Updated: 10/01/23 0948     Lipase <6 u/L     HS Troponin 0hr (reflex protocol) [[id number]]  (Normal) Collected: 10/01/23 0907    Lab Status: Final result Specimen: Blood from Arm, Left Updated: 10/01/23 0939     hs TnI 0hr 2 ng/L     CBC and differential [[id number]]  (Abnormal) Collected: 10/01/23 0907    Lab Status: Final result Specimen: Blood from Arm, Left Updated: 10/01/23 0921     WBC 8.74 Thousand/uL      RBC 5.32 Million/uL      Hemoglobin 13.5 g/dL      Hematocrit 44.8 %      MCV 84 fL      MCH 25.4 pg      MCHC 30.1 g/dL      RDW 15.0 %      MPV 8.9 fL      Platelets 185 Thousands/uL      nRBC 0 /100 WBCs      Neutrophils Relative 63 %      Immat GRANS % 0 %      Lymphocytes Relative 26 %      Monocytes Relative 7 %      Eosinophils Relative 3 %      Basophils Relative 1 %      Neutrophils Absolute 5.47 Thousands/µL      Immature Grans Absolute 0.03 Thousand/uL      Lymphocytes Absolute 2.30 Thousands/µL      Monocytes Absolute 0.61 Thousand/µL      Eosinophils Absolute 0.27 Thousand/µL      Basophils Absolute 0.06 Thousands/µL     Trichomonas vaginalis/Mycoplasma genitalium PCR [[id number]] Collected: 10/01/23 0906    Lab Status: In process Specimen: Urine, Clean Catch Updated: 10/01/23 0916    Chlamydia/GC amplified DNA by PCR [[id number]] Collected: 10/01/23 0906    Lab Status: In process Specimen: Urine, Other Updated: 10/01/23 0913                 CTA ED chest PE study   Final Result by Ced Vyas MD (10/01 1222)      No occlusive pulmonary embolism seen in the main pulmonary artery and there central pulmonary artery branches      A linear filling defect in the one of the segmental branch of the right lower lobe pulmonary artery suggesting chronic PE sequela of previous pulmonary embolus seen in this area on the previous study      A focal area of mild groundglass density in the superior segment of the right lower lobe subtended by the area of chronic pulmonary embolism may be decreased perfusion, image 103 series 5         Mild basilar groundglass densities at the right lung base, nonspecific may be due to atelectasis or  infiltrate   The study was marked in EPIC for immediate notification. Workstation performed: [id number]         XR chest 2 views   ED Interpretation by Za Rowland MD (10/01 9986)   No acute cardiopulmonary process. No consolidations, pneumothorax, abrupt vascular tapering, [place] hump, free air under the diaphragm, pleural effusions noted. Procedures  Procedures      ED Course  ED Course as of 10/01/23 1336   Sun Oct 01, 2023   8255 EKG personally interpreted by me, sinus bradycardia rate of 59, normal AR interval, normal QRS interval, QTc 417, normal axis, no ST elevations, no ST depressions, no ischemic changes or STEMI noted, no pathological Q waves.    0942 hs TnI 0hr: 2  normal 3770 FLU/RSV/COVID - if FLU/RSV clinically relevant  Negative   1016 D-Dimer, Quant(!): 0.65   1016 RAPID HIV 1/2 AB-AG COMBO for 15years old and above  WNL   1016 UA w Reflex to Microscopic w Reflex to Culture(!)  WNL   1022 Reviewed results including dimer, troponin, HIV testing, CMP, cbc, viral panel. Discussed inability to r/o PE at this time and will proceed w/ CTA PE study. 0 IMPRESSION:     No occlusive pulmonary embolism seen in the main pulmonary artery and there central pulmonary artery branches     A linear filling defect in the one of the segmental branch of the right lower lobe pulmonary artery suggesting chronic PE sequela of previous pulmonary embolus seen in this area on the previous study     A focal area of mild groundglass density in the superior segment of the right lower lobe subtended by the area of chronic pulmonary embolism may be decreased perfusion, image 103 series 5        Mild basilar groundglass densities at the right lung base, nonspecific may be due to atelectasis or  infiltrate  The study was marked in [facility] for immediate notification.           Workstation performed: [id number]                             SBIRT 22yo+    Flowsheet Row Most Recent Value   Initial Alcohol Screen: US AUDIT-C     1. How often do you have a drink containing alcohol? 0 Filed at: 10/01/2023 0840   2. How many drinks containing alcohol do you have on a typical day you are drinking? 0 Filed at: 10/01/2023 0840   3b. FEMALE Any Age, or MALE 65+: How often do you have 4 or more drinks on one occassion? 0 Filed at: 10/01/2023 0840   Audit-C Score 0 Filed at: 10/01/2023 0840   ABDIAS: How many times in the past year have you. .. Used an illegal drug or used a prescription medication for non-medical reasons?  Never Filed at: 10/01/2023 9398          Wells' Criteria for PE    Flowsheet Row Most Recent Value   Wells' Criteria for PE    Clinical signs and symptoms of DVT 0 Filed at: 10/01/2023 1324   PE is primary diagnosis or equally likely 3 Filed at: 10/01/2023 1324   HR >100 0 Filed at: 10/01/2023 1324   Immobilization at least 3 days or Surgery in the previous 4 weeks 0 Filed at: 10/01/2023 1324   Previous, objectively diagnosed PE or DVT 1.5 Filed at: 10/01/2023 1324   Hemoptysis 0 Filed at: 10/01/2023 1324   Malignancy with treatment within 6 months or palliative 0 Filed at: 10/01/2023 1324   Wells' Criteria Total 4.5 Filed at: 10/01/2023 1324            Medical Decision Making  25-year-old female presents with headaches, shortness of breath, urinary complaints and flank pain. Differential includes but not limited to pneumonia, viral URI, UTI, STD, bacterial vaginosis, sinusitis  D-dimer, EKG, chest x-ray, CBC, CMP, lipase, troponin, viral panel, STD testing  HIV negative. UA clear, no blood, no leukocytes, normal renal function, no elevation in white count, afebrile, unlikely pyelonephritis, UTI, kidney stone  Normal LFTs, afebrile, abdomen soft and nontender, normal lipase, no jaundice, unlikely cholecystitis, pancreatitis, appendicitis. CTA PE study notable for chronic changes and atelectasis versus consolidation. We will treat for pneumonia vaginitis. Allergy to Rocephin, will give azithromycin and gentamicin. Diflucan. Follow-up with primary care physician. Discharged home to self-care with strict return precautions for continued or worsening symptoms, shortness of breath, chest pain, diaphoresis, nausea, vomiting. Patient understanding and agreement with plan. Amount and/or Complexity of Data Reviewed  Labs: ordered. Decision-making details documented in ED Course. Radiology: ordered and independent interpretation performed. Risk  OTC drugs. Prescription drug management.             Disposition  Final diagnoses:   Pneumonia of right lower lobe due to infectious organism   Acute vaginitis     Time reflects when diagnosis was documented in both MDM as applicable and the Disposition within this note     Time User Action Codes Description Comment    10/1/2023 12:59 PM Carole Sanches Add [R06.02] SOB (shortness of breath)     10/1/2023 12:59 PM Carole Sanches Remove [R06.02] SOB (shortness of breath)     10/1/2023  1:00 PM Carole Sanches Add [J18.9] Pneumonia of right lower lobe due to infectious organism     10/1/2023  1:01 PM Carole Sanches Add [N76.0] Acute vaginitis       ED Disposition     ED Disposition   Discharge    Condition   Stable    Date/Time   Sun Oct 1, 2023  1:01 PM    [address] discharge to home/self care. Follow-up Information     Follow up With Specialties Details Why Contact Info Additional [address] [facility] Family Medicine Schedule an appointment as soon as possible for a visit in 3 days  [address] [id number]  [address] [facility], [address] [place] [address], [address] [place] [address] Emergency Department Emergency Medicine Go to  If symptoms worsen [address] [facility]  [id number]  [address] [facility] Emergency Department          Patient's Medications   Discharge Prescriptions    AZITHROMYCIN (ZITHROMAX) 250 MG TABLET    Take 2 tablets today then 1 tablet daily x 4 days       Start Date: 10/1/2023 End Date: 10/5/2023       Order Dose: --       Quantity: 6 tablet    Refills: 0    FLUCONAZOLE (DIFLUCAN) 150 MG TABLET    Take 1 tablet (150 mg total) by mouth once for 1 dose       Start Date: 10/1/2023 End Date: 10/1/2023       Order Dose: 150 mg       Quantity: 1 tablet    Refills: 0     No discharge procedures on file. PDMP Review     None           ED Provider  Attending physically available and evaluated Candida Sotelo. I managed the patient along with the ED Attending.     Electronically Signed by         Chase Mckinnon MD [place]  10/01/23 2222

## 2023-10-01 NOTE — DISCHARGE INSTRUCTIONS
-Please follow-up with your primary care physician in the next week. -Please take your next dose of Diflucan on 10/3/2023  -Please return to the emergency department if you begin to develop high fevers, severe shortness of breath, chest pain, severe abdominal pains, continued or worsening symptoms.

## 2023-10-02 LAB
C GLABRATA DNA VAG QL NAA+PROBE: NEGATIVE
C KRUSEI DNA VAG QL NAA+PROBE: NEGATIVE
C TRACH DNA SPEC QL NAA+PROBE: NEGATIVE
CANDIDA SP 6 PNL VAG NAA+PROBE: POSITIVE
M GENITALIUM DNA SPEC QL NAA+PROBE: NEGATIVE
N GONORRHOEA DNA SPEC QL NAA+PROBE: NEGATIVE
T VAGINALIS DNA SPEC QL NAA+PROBE: NEGATIVE
T VAGINALIS DNA VAG QL NAA+PROBE: NEGATIVE
VAGINOSIS/ITIS DNA PNL VAG PROBE+SIG AMP: POSITIVE

## 2023-10-02 RX ORDER — METRONIDAZOLE 500 MG/1
500 TABLET ORAL EVERY 12 HOURS SCHEDULED
Qty: 14 TABLET | Refills: 0 | Status: SHIPPED | OUTPATIENT
Start: 2023-10-02 | End: 2023-10-09

## 2023-11-06 ENCOUNTER — APPOINTMENT (EMERGENCY)
Dept: RADIOLOGY | Facility: HOSPITAL | Age: 43
End: 2023-11-06
Payer: COMMERCIAL

## 2023-11-06 ENCOUNTER — HOSPITAL ENCOUNTER (EMERGENCY)
Facility: HOSPITAL | Age: 43
Discharge: HOME | End: 2023-11-06
Attending: EMERGENCY MEDICINE | Admitting: EMERGENCY MEDICINE
Payer: COMMERCIAL

## 2023-11-06 VITALS
SYSTOLIC BLOOD PRESSURE: 129 MMHG | DIASTOLIC BLOOD PRESSURE: 76 MMHG | TEMPERATURE: 97.7 F | HEART RATE: 63 BPM | WEIGHT: 259.9 LBS | BODY MASS INDEX: 43.25 KG/M2 | RESPIRATION RATE: 16 BRPM | OXYGEN SATURATION: 98 %

## 2023-11-06 DIAGNOSIS — J06.9 VIRAL URI WITH COUGH: Primary | ICD-10-CM

## 2023-11-06 DIAGNOSIS — J02.9 VIRAL PHARYNGITIS: ICD-10-CM

## 2023-11-06 PROCEDURE — 99283 EMERGENCY DEPT VISIT LOW MDM: CPT

## 2023-11-06 PROCEDURE — 99284 EMERGENCY DEPT VISIT MOD MDM: CPT | Performed by: EMERGENCY MEDICINE

## 2023-11-06 PROCEDURE — 71045 X-RAY EXAM CHEST 1 VIEW: CPT

## 2023-11-06 RX ORDER — DEXAMETHASONE 4 MG/1
10 TABLET ORAL ONCE
Status: COMPLETED | OUTPATIENT
Start: 2023-11-06 | End: 2023-11-06

## 2023-11-06 RX ADMIN — DEXAMETHASONE 10 MG: 4 TABLET ORAL at 11:48

## 2023-11-06 NOTE — ED PROVIDER NOTES
History  Chief Complaint   Patient presents with    Cold Like Symptoms     X days     HPI  Patient is a 41-year-old female presenting with a cough, congestion, sore throat for about a week. Denies any fever, chills, nausea, vomiting, diarrhea. Was diagnosed with pneumonia about a month ago and completed her antibiotic course and felt better but her cough started again since last week with productive cough. Denies any discoloration of the sputum or hemoptysis. Reports no chest pain or difficulty breathing. Also reports no sick contacts or recent travels. Does complain of some itching around the eyes that resolves with Benadryl. Currently asymptomatic. Reports no other complaints      Prior to Admission Medications   Prescriptions Last Dose Informant Patient Reported? Taking?   b complex-C-E-zinc (STRESS FORMULA/ZINC) tablet   No No   Sig: Take 1 tablet by mouth daily      Facility-Administered Medications: None       Past Medical History:   Diagnosis Date    Asthma     Hx of blood clots     Psoriasis     Spinal stenosis        Past Surgical History:   Procedure Laterality Date    APPENDECTOMY      BACK SURGERY       SECTION      x 3    HYSTERECTOMY  2015    Radical       History reviewed. No pertinent family history. I have reviewed and agree with the history as documented. E-Cigarette/Vaping    E-Cigarette Use Never User      E-Cigarette/Vaping Substances     Social History     Tobacco Use    Smoking status: Former     Packs/day: 0.25     Types: Cigarettes    Smokeless tobacco: Never   Vaping Use    Vaping Use: Never used   Substance Use Topics    Alcohol use: No    Drug use: Not Currently     Types: Marijuana       Review of Systems   Constitutional:  Negative for chills, diaphoresis, fever and unexpected weight change. HENT:  Positive for congestion and sore throat. Negative for ear pain. Eyes:  Negative for visual disturbance. Respiratory:  Positive for cough.  Negative for chest tightness and shortness of breath. Cardiovascular:  Negative for chest pain and leg swelling. Gastrointestinal:  Negative for abdominal distention, abdominal pain, constipation, diarrhea, nausea and vomiting. Endocrine: Negative. Genitourinary:  Negative for difficulty urinating and dysuria. Musculoskeletal: Negative. Skin: Negative. Allergic/Immunologic: Negative. Neurological: Negative. Hematological: Negative. Psychiatric/Behavioral: Negative. All other systems reviewed and are negative. Physical Exam  Physical Exam  Vitals and nursing note reviewed. Constitutional:       General: She is not in acute distress. Appearance: Normal appearance. She is not ill-appearing. HENT:      Head: Normocephalic and atraumatic. Right Ear: External ear normal.      Left Ear: External ear normal.      Nose: Nose normal.      Mouth/Throat:      Mouth: Mucous membranes are moist.      Pharynx: Oropharynx is clear. Comments: Bilateral tonsillar swelling without any exudate  Eyes:      General: No scleral icterus. Right eye: No discharge. Left eye: No discharge. Extraocular Movements: Extraocular movements intact. Conjunctiva/sclera: Conjunctivae normal.      Pupils: Pupils are equal, round, and reactive to light. Cardiovascular:      Rate and Rhythm: Normal rate and regular rhythm. Pulses: Normal pulses. Heart sounds: Normal heart sounds. Pulmonary:      Effort: Pulmonary effort is normal.      Breath sounds: Normal breath sounds. Abdominal:      General: Abdomen is flat. Bowel sounds are normal. There is no distension. Palpations: Abdomen is soft. Tenderness: There is no abdominal tenderness. There is no guarding or rebound. Musculoskeletal:         General: Normal range of motion. Cervical back: Normal range of motion and neck supple. Skin:     General: Skin is warm and dry.       Capillary Refill: Capillary refill takes less than 2 seconds. Neurological:      General: No focal deficit present. Mental Status: She is alert and oriented to person, place, and time. Mental status is at baseline. Psychiatric:         Mood and Affect: Mood normal.         Behavior: Behavior normal.         Thought Content: Thought content normal.         Judgment: Judgment normal.         Vital Signs  ED Triage Vitals [11/06/23 1045]   Temperature Pulse Respirations Blood Pressure SpO2   97.7 °F (36.5 °C) 63 16 129/76 98 %      Temp Source Heart Rate Source Patient Position - Orthostatic VS BP Location FiO2 (%)   Tympanic Monitor Sitting Left arm --      Pain Score       --           Vitals:    11/06/23 1045   BP: 129/76   Pulse: 63   Patient Position - Orthostatic VS: Sitting         Visual Acuity      ED Medications  Medications   dexamethasone (DECADRON) tablet 10 mg (10 mg Oral Given 11/6/23 1148)       Diagnostic Studies  Results Reviewed       None                   XR chest 1 view portable   ED Interpretation by Mukesh Panchal MD (11/06 1203)   No obvious cardiopulmonary disorder                 Procedures  Procedures         ED Course                                             Medical Decision Making  53-year-old female presenting with cough, congestion, sore throat  Patient does have bilateral tonsillar swelling but without any exudates and no cervical lymphadenopathy  Most likely viral pharyngitis we will give Decadron for symptom relief  X-ray obtained due to ongoing cough. X-rays show no cardiopulmonary disorder  Unlikely be bacterial pneumonia and will plan to discharge with return precautions    Amount and/or Complexity of Data Reviewed  Radiology: ordered and independent interpretation performed. Risk  Prescription drug management.              Disposition  Final diagnoses:   Viral URI with cough   Viral pharyngitis     Time reflects when diagnosis was documented in both MDM as applicable and the Disposition within this note Time User Action Codes Description Comment    11/6/2023 12:04 PM Donnie Segura Add [J06.9] Viral URI with cough     11/6/2023 12:04 PM Donnie Segura Add [J02.9] Viral pharyngitis           ED Disposition       ED Disposition   Discharge    Condition   Stable    Date/Time   Mon Nov 6, 2023 12:04 PM    [address] discharge to home/self care. Follow-up Information       Follow up With Specialties Details Why Contact Info Additional [address] [facility] Emergency [facility] Go to  If symptoms worsen [address] [phone] [facility]            Patient's Medications   Discharge Prescriptions    No medications on file       No discharge procedures on file.     PDMP Review       None            ED Provider  Electronically Signed by             Donnie Segura MD  11/06/23 0315

## 2024-06-21 ENCOUNTER — HOSPITAL ENCOUNTER (EMERGENCY)
Facility: HOSPITAL | Age: 44
Discharge: HOME | End: 2024-06-21
Attending: EMERGENCY MEDICINE

## 2024-06-21 VITALS
HEART RATE: 88 BPM | SYSTOLIC BLOOD PRESSURE: 153 MMHG | DIASTOLIC BLOOD PRESSURE: 72 MMHG | TEMPERATURE: 98.5 F | OXYGEN SATURATION: 98 % | RESPIRATION RATE: 17 BRPM

## 2024-06-21 DIAGNOSIS — Z91.148 DIFFICULTY OBTAINING MEDICATION: Primary | ICD-10-CM

## 2024-06-21 PROCEDURE — 99284 EMERGENCY DEPT VISIT MOD MDM: CPT | Performed by: EMERGENCY MEDICINE

## 2024-06-21 PROCEDURE — 99282 EMERGENCY DEPT VISIT SF MDM: CPT
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
## 2024-08-23 ENCOUNTER — APPOINTMENT (EMERGENCY)
Dept: CT IMAGING | Facility: HOSPITAL | Age: 44
DRG: 134 | End: 2024-08-23
Payer: COMMERCIAL

## 2024-08-23 ENCOUNTER — HOSPITAL ENCOUNTER (INPATIENT)
Facility: HOSPITAL | Age: 44
LOS: 4 days | Discharge: HOME | DRG: 134 | End: 2024-08-27
Attending: EMERGENCY MEDICINE | Admitting: FAMILY MEDICINE
Payer: COMMERCIAL

## 2024-08-23 DIAGNOSIS — M54.50 CHRONIC BILATERAL LOW BACK PAIN WITHOUT SCIATICA: ICD-10-CM

## 2024-08-23 DIAGNOSIS — R42 DIZZINESS: ICD-10-CM

## 2024-08-23 DIAGNOSIS — G89.29 CHRONIC BILATERAL LOW BACK PAIN WITHOUT SCIATICA: ICD-10-CM

## 2024-08-23 DIAGNOSIS — Z86.711 HX PULMONARY EMBOLISM: ICD-10-CM

## 2024-08-23 DIAGNOSIS — F11.91 OPIOID USE DISORDER IN REMISSION: ICD-10-CM

## 2024-08-23 DIAGNOSIS — I26.93 SINGLE SUBSEGMENTAL PULMONARY EMBOLISM WITHOUT ACUTE COR PULMONALE (HCC): Primary | ICD-10-CM

## 2024-08-23 DIAGNOSIS — F19.11 HX OF SUBSTANCE ABUSE (HCC): ICD-10-CM

## 2024-08-23 DIAGNOSIS — Z86.718 HISTORY OF DVT (DEEP VEIN THROMBOSIS): ICD-10-CM

## 2024-08-23 PROBLEM — M79.89 LEFT LEG SWELLING: Status: ACTIVE | Noted: 2024-08-23

## 2024-08-23 PROBLEM — R91.8 PULMONARY NODULES: Status: ACTIVE | Noted: 2024-08-23

## 2024-08-23 PROBLEM — Z20.2 STD EXPOSURE: Status: ACTIVE | Noted: 2024-08-23

## 2024-08-23 LAB
ALBUMIN SERPL BCG-MCNC: 3.9 G/DL (ref 3.5–5)
ALP SERPL-CCNC: 75 U/L (ref 34–104)
ALT SERPL W P-5'-P-CCNC: 13 U/L (ref 7–52)
ANION GAP SERPL CALCULATED.3IONS-SCNC: 9 MMOL/L (ref 4–13)
APTT PPP: 28 SECONDS (ref 23–34)
AST SERPL W P-5'-P-CCNC: 14 U/L (ref 13–39)
ATRIAL RATE: 83 BPM
BASOPHILS # BLD AUTO: 0.09 THOUSANDS/ÂΜL (ref 0–0.1)
BASOPHILS NFR BLD AUTO: 1 % (ref 0–1)
BILIRUB SERPL-MCNC: 0.26 MG/DL (ref 0.2–1)
BILIRUB UR QL STRIP: NEGATIVE
BNP SERPL-MCNC: 51 PG/ML (ref 0–100)
BUN SERPL-MCNC: 13 MG/DL (ref 5–25)
CALCIUM SERPL-MCNC: 9.4 MG/DL (ref 8.4–10.2)
CARDIAC TROPONIN I PNL SERPL HS: <2 NG/L
CHLORIDE SERPL-SCNC: 104 MMOL/L (ref 96–108)
CLARITY UR: CLEAR
CO2 SERPL-SCNC: 26 MMOL/L (ref 21–32)
COLOR UR: NORMAL
CREAT SERPL-MCNC: 0.72 MG/DL (ref 0.6–1.3)
EOSINOPHIL # BLD AUTO: 0.17 THOUSAND/ÂΜL (ref 0–0.61)
EOSINOPHIL NFR BLD AUTO: 2 % (ref 0–6)
ERYTHROCYTE [DISTWIDTH] IN BLOOD BY AUTOMATED COUNT: 14.5 % (ref 11.6–15.1)
GFR SERPL CREATININE-BSD FRML MDRD: 100 ML/MIN/1.73SQ M
GLUCOSE SERPL-MCNC: 111 MG/DL (ref 65–140)
GLUCOSE UR STRIP-MCNC: NEGATIVE MG/DL
HCT VFR BLD AUTO: 44.5 % (ref 34.8–46.1)
HGB BLD-MCNC: 13.7 G/DL (ref 11.5–15.4)
HGB UR QL STRIP.AUTO: NEGATIVE
IMM GRANULOCYTES # BLD AUTO: 0.03 THOUSAND/UL (ref 0–0.2)
IMM GRANULOCYTES NFR BLD AUTO: 0 % (ref 0–2)
INR PPP: 0.94 (ref 0.85–1.19)
KETONES UR STRIP-MCNC: NEGATIVE MG/DL
LEUKOCYTE ESTERASE UR QL STRIP: NEGATIVE
LYMPHOCYTES # BLD AUTO: 3.27 THOUSANDS/ÂΜL (ref 0.6–4.47)
LYMPHOCYTES NFR BLD AUTO: 41 % (ref 14–44)
MCH RBC QN AUTO: 26.7 PG (ref 26.8–34.3)
MCHC RBC AUTO-ENTMCNC: 30.8 G/DL (ref 31.4–37.4)
MCV RBC AUTO: 87 FL (ref 82–98)
MONOCYTES # BLD AUTO: 0.58 THOUSAND/ÂΜL (ref 0.17–1.22)
MONOCYTES NFR BLD AUTO: 7 % (ref 4–12)
NEUTROPHILS # BLD AUTO: 3.89 THOUSANDS/ÂΜL (ref 1.85–7.62)
NEUTS SEG NFR BLD AUTO: 49 % (ref 43–75)
NITRITE UR QL STRIP: NEGATIVE
NRBC BLD AUTO-RTO: 0 /100 WBCS
P AXIS: 67 DEGREES
PH UR STRIP.AUTO: 6 [PH]
PLATELET # BLD AUTO: 340 THOUSANDS/UL (ref 149–390)
PMV BLD AUTO: 8.5 FL (ref 8.9–12.7)
POTASSIUM SERPL-SCNC: 4 MMOL/L (ref 3.5–5.3)
PR INTERVAL: 162 MS
PROT SERPL-MCNC: 6.7 G/DL (ref 6.4–8.4)
PROT UR STRIP-MCNC: NEGATIVE MG/DL
PROTHROMBIN TIME: 12.9 SECONDS (ref 12.3–15)
QRS AXIS: 21 DEGREES
QRSD INTERVAL: 78 MS
QT INTERVAL: 370 MS
QTC INTERVAL: 434 MS
RBC # BLD AUTO: 5.14 MILLION/UL (ref 3.81–5.12)
SODIUM SERPL-SCNC: 139 MMOL/L (ref 135–147)
SP GR UR STRIP.AUTO: 1.01 (ref 1–1.04)
T WAVE AXIS: 59 DEGREES
UROBILINOGEN UA: NEGATIVE MG/DL
VENTRICULAR RATE: 83 BPM
WBC # BLD AUTO: 8.03 THOUSAND/UL (ref 4.31–10.16)

## 2024-08-23 PROCEDURE — 71275 CT ANGIOGRAPHY CHEST: CPT

## 2024-08-23 PROCEDURE — 99285 EMERGENCY DEPT VISIT HI MDM: CPT | Performed by: EMERGENCY MEDICINE

## 2024-08-23 PROCEDURE — 84484 ASSAY OF TROPONIN QUANT: CPT

## 2024-08-23 PROCEDURE — 99223 1ST HOSP IP/OBS HIGH 75: CPT | Performed by: FAMILY MEDICINE

## 2024-08-23 PROCEDURE — 85025 COMPLETE CBC W/AUTO DIFF WBC: CPT

## 2024-08-23 PROCEDURE — 87591 N.GONORRHOEAE DNA AMP PROB: CPT

## 2024-08-23 PROCEDURE — 93010 ELECTROCARDIOGRAM REPORT: CPT | Performed by: INTERNAL MEDICINE

## 2024-08-23 PROCEDURE — 36415 COLL VENOUS BLD VENIPUNCTURE: CPT

## 2024-08-23 PROCEDURE — 87491 CHLMYD TRACH DNA AMP PROBE: CPT

## 2024-08-23 PROCEDURE — 85730 THROMBOPLASTIN TIME PARTIAL: CPT

## 2024-08-23 PROCEDURE — NC001 PR NO CHARGE: Performed by: FAMILY MEDICINE

## 2024-08-23 PROCEDURE — 83880 ASSAY OF NATRIURETIC PEPTIDE: CPT

## 2024-08-23 PROCEDURE — 80053 COMPREHEN METABOLIC PANEL: CPT

## 2024-08-23 PROCEDURE — 93005 ELECTROCARDIOGRAM TRACING: CPT

## 2024-08-23 PROCEDURE — 81003 URINALYSIS AUTO W/O SCOPE: CPT

## 2024-08-23 PROCEDURE — 99285 EMERGENCY DEPT VISIT HI MDM: CPT

## 2024-08-23 PROCEDURE — 85610 PROTHROMBIN TIME: CPT

## 2024-08-23 RX ORDER — ENOXAPARIN SODIUM 150 MG/ML
1 INJECTION SUBCUTANEOUS EVERY 12 HOURS SCHEDULED
Status: DISCONTINUED | OUTPATIENT
Start: 2024-08-23 | End: 2024-08-26

## 2024-08-23 RX ORDER — CLOBETASOL PROPIONATE 0.5 MG/G
OINTMENT TOPICAL 2 TIMES DAILY
COMMUNITY
Start: 2024-06-14 | End: 2024-08-27

## 2024-08-23 RX ORDER — DEXTROMETHORPHAN HYDROBROMIDE AND PROMETHAZINE HYDROCHLORIDE 15; 6.25 MG/5ML; MG/5ML
5 SYRUP ORAL EVERY 4 HOURS PRN
COMMUNITY
Start: 2024-06-14 | End: 2024-08-27

## 2024-08-23 RX ORDER — BUPRENORPHINE AND NALOXONE 8; 2 MG/1; MG/1
8 FILM, SOLUBLE BUCCAL; SUBLINGUAL 2 TIMES DAILY
Status: DISCONTINUED | OUTPATIENT
Start: 2024-08-23 | End: 2024-08-27 | Stop reason: HOSPADM

## 2024-08-23 RX ORDER — WARFARIN SODIUM 5 MG/1
5 TABLET ORAL
Status: DISCONTINUED | OUTPATIENT
Start: 2024-08-23 | End: 2024-08-24

## 2024-08-23 RX ORDER — ENOXAPARIN SODIUM 150 MG/ML
120 INJECTION SUBCUTANEOUS EVERY 24 HOURS
COMMUNITY
Start: 2024-06-14 | End: 2024-08-23

## 2024-08-23 RX ADMIN — IOHEXOL 90 ML: 350 INJECTION, SOLUTION INTRAVENOUS at 16:11

## 2024-08-23 RX ADMIN — BUPRENORPHINE AND NALOXONE 8 MG: 8; 2 FILM BUCCAL; SUBLINGUAL at 20:19

## 2024-08-23 RX ADMIN — WARFARIN SODIUM 5 MG: 5 TABLET ORAL at 22:09

## 2024-08-23 RX ADMIN — ENOXAPARIN SODIUM 135 MG: 150 INJECTION SUBCUTANEOUS at 19:00

## 2024-08-23 NOTE — ASSESSMENT & PLAN NOTE
Most likely stasis venous.    - LLE VAS Duplex reviewed, no evidence of DVT   - See assessment and plan for PE.
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
## 2024-08-23 NOTE — ASSESSMENT & PLAN NOTE
CT Chest 08/23/2024: 2 to 3 mm pulmonary nodules.  4.2 cm fusiform ascending thoracic aortic ectasi    -Follow up chest CT recommended in 12 months for the above findings in outpatient.

## 2024-08-23 NOTE — ASSESSMENT & PLAN NOTE
Recent admission to rehab for substance use, currently on Suboxone 8-2 mg BID     Plan:   - Continue Suboxone 8-2 mg BID   - COWS score q daily

## 2024-08-23 NOTE — ED CARE HANDOFF
Emergency Department Sign Out Note        Sign out and transfer of care from Dr. Leon. See Separate Emergency Department note.     The patient, Elaine Omer, was evaluated by the previous provider for dyspnea, h/o PE, off anticoagulation.    Workup Completed:  Labs, ct ordered    ED Course / Workup Pending (followup):  Pending ct.                        Wells' Criteria for PE      Flowsheet Row Most Recent Value   Wells' Criteria for PE    Clinical signs and symptoms of DVT 3 Filed at: 08/23/2024 1501   PE is primary diagnosis or equally likely 3 Filed at: 08/23/2024 1501   HR >100 0 Filed at: 08/23/2024 1501   Immobilization at least 3 days or Surgery in the previous 4 weeks 0 Filed at: 08/23/2024 1501   Previous, objectively diagnosed PE or DVT 1.5 Filed at: 08/23/2024 1501   Hemoptysis 0 Filed at: 08/23/2024 1501   Malignancy with treatment within 6 months or palliative 0 Filed at: 08/23/2024 1501   Wells' Criteria Total 7.5 Filed at: 08/23/2024 1501                     Procedures  Medical Decision Making  Amount and/or Complexity of Data Reviewed  Labs: ordered.  Radiology: ordered.            Disposition  Final diagnoses:   None     ED Disposition       None          Follow-up Information    None       Patient's Medications   Discharge Prescriptions    No medications on file     No discharge procedures on file.       ED Provider  Electronically Signed by     Nitin Yousif MD  08/23/24 1521

## 2024-08-23 NOTE — ASSESSMENT & PLAN NOTE
Stable for discharged   History of multiple, recurrent DVTs/PEs starting back in 2013.   IVC placement in 2014 at [facility].  Non adherence to home medication warfarin in the setting of insurance difficulties in the past.  Asymptomatic   Patient does have medical insurance ([employer]) which will allow to get the meds    Plan:   - Continue Start Xarelto 15 mg BID for 21 days, then 20 mg daily.   - Follow up with PCP, who need to prescribe Xarelto 20 mg daily in order to continue with the regimen.  - Consider workup for underlying causes of DVT/PE outpatient.

## 2024-08-23 NOTE — ED PROVIDER NOTES
History  Chief Complaint   Patient presents with    Dizziness     2 days-with SOB. Checked  this AM, she is not diabetic.  No N/V/D.     Shortness of Breath     Hx of clots       45 y/o female w/a PMH of RACHEL pulmonary embolisms w/IVC filter in place, RACHEL DVT's and asthma presents to the ED with concerns of a 1 day history of progressively worsening shortness of breath and dizziness. Associated symptoms include chest tightness, palpitations, and bilateral leg swelling. Her history of DVTs/PE' s dates back to 2013, last CTA 6/21/24. Patient states she should be on Warfarin however says she never got her medication refilled after her last hospitalization. She denies any fever, chills, headache, sore throat, cough, hemoptysis, wheezing, vomiting, diarrhea, bleeding, skin changes, muscle or joint pains, urinary or bowel changes. Past surgical history includes back surgery w/rods and screws in place, c section x3, hysterectomy, and appendectomy. She is a current marijuana smoker.       Dizziness  Associated symptoms: chest pain (Mild w/inspiration), palpitations (Since the onset of symptoms) and shortness of breath    Associated symptoms: no blood in stool, no diarrhea, no headaches, no nausea, no vomiting and no weakness    Shortness of Breath  Associated symptoms: chest pain (Mild w/inspiration) and diaphoresis    Associated symptoms: no abdominal pain, no cough, no fever, no headaches, no neck pain, no rash, no sore throat, no vomiting and no wheezing        Prior to Admission Medications   Prescriptions Last Dose Informant Patient Reported? Taking?   b complex-C-E-zinc (STRESS FORMULA/ZINC) tablet   No No   Sig: Take 1 tablet by mouth daily   clobetasol (TEMOVATE) 0.05 % ointment   Yes Yes   Sig: Apply topically 2 (two) times a day   promethazine-dextromethorphan (PHENERGAN-DM) 6.25-15 mg/5 mL oral syrup   Yes Yes   Sig: Take 5 mL by mouth every 4 (four) hours as needed      Facility-Administered Medications:  None       Past Medical History:   Diagnosis Date    Asthma     Hx of blood clots     Psoriasis     Spinal stenosis        Past Surgical History:   Procedure Laterality Date    APPENDECTOMY      BACK SURGERY       SECTION      x 3    HYSTERECTOMY  2015    Radical       History reviewed. No pertinent family history.  I have reviewed and agree with the history as documented.    E-Cigarette/Vaping    E-Cigarette Use Never User      E-Cigarette/Vaping Substances     Social History     Tobacco Use    Smoking status: Former     Current packs/day: 0.25     Types: Cigarettes    Smokeless tobacco: Never   Vaping Use    Vaping status: Never Used   Substance Use Topics    Alcohol use: No    Drug use: Not Currently     Types: Marijuana        Review of Systems   Constitutional:  Positive for diaphoresis. Negative for chills and fever.   HENT:  Negative for sore throat.    Eyes:  Positive for visual disturbance (When dizzy endorses black spots).   Respiratory:  Positive for chest tightness and shortness of breath. Negative for cough, wheezing and stridor.    Cardiovascular:  Positive for chest pain (Mild w/inspiration), palpitations (Since the onset of symptoms) and leg swelling (Bilateral,  L > R).   Gastrointestinal:  Negative for abdominal pain, anal bleeding, blood in stool, constipation, diarrhea, nausea and vomiting.   Genitourinary:  Negative for difficulty urinating, dysuria, flank pain, frequency, hematuria, urgency, vaginal bleeding, vaginal discharge and vaginal pain.   Musculoskeletal:  Negative for arthralgias, back pain, joint swelling, myalgias, neck pain and neck stiffness.   Skin:  Negative for color change, pallor, rash and wound.   Allergic/Immunologic: Positive for food allergies (Fish).        Allergy to vicodine     Neurological:  Positive for dizziness. Negative for seizures, speech difficulty, weakness, numbness and headaches.   Hematological:  Does not bruise/bleed easily.    Psychiatric/Behavioral:  Positive for agitation. Negative for behavioral problems, confusion and decreased concentration. The patient is nervous/anxious (States she knows she has another PE).        Physical Exam  ED Triage Vitals [08/23/24 1422]   Temperature Pulse Respirations Blood Pressure SpO2   98.1 °F (36.7 °C) 86 18 108/67 96 %      Temp src Heart Rate Source Patient Position - Orthostatic VS BP Location FiO2 (%)   -- -- -- -- --      Pain Score       --             Orthostatic Vital Signs  Vitals:    08/23/24 1422   BP: 108/67   Pulse: 86       Physical Exam  Vitals and nursing note reviewed.   Constitutional:       General: She is in acute distress (Feeling hot, trouble breathing).      Appearance: She is not diaphoretic.   HENT:      Head: Normocephalic and atraumatic.      Mouth/Throat:      Mouth: Mucous membranes are moist.      Pharynx: Oropharynx is clear. No oropharyngeal exudate.   Eyes:      Extraocular Movements: Extraocular movements intact.      Pupils: Pupils are equal, round, and reactive to light.   Neck:      Vascular: No JVD.   Cardiovascular:      Rate and Rhythm: Normal rate and regular rhythm.      Pulses: Normal pulses. No decreased pulses.      Heart sounds: Normal heart sounds. No murmur heard.  Pulmonary:      Effort: No tachypnea, bradypnea or accessory muscle usage.      Breath sounds: Normal breath sounds. No stridor. No decreased breath sounds, wheezing, rhonchi or rales.      Comments: Poor inspiratory effort, could not perform deep breaths during auscultation.   Breath sounds greater on left as compared to right.   Chest:      Chest wall: Tenderness (mild tenderness to palpation) present. No crepitus.   Abdominal:      General: Bowel sounds are normal.      Palpations: Abdomen is soft.      Tenderness: There is no abdominal tenderness.   Genitourinary:     Comments: Deferred  Musculoskeletal:         General: Normal range of motion.      Cervical back: Normal range of motion  and neck supple.      Right lower leg: No tenderness (No tenderness to palpation). Edema present.      Left lower leg: Tenderness (Law Sign positive, Nura sign negative) present. Edema present.      Comments: Left leg swelling > right. Pulses normal.   No warmth, erythema, or distension of superficial veins.     Skin:     General: Skin is warm and dry.      Capillary Refill: Capillary refill takes less than 2 seconds.      Coloration: Skin is not cyanotic or pale.      Findings: No ecchymosis, erythema or rash.   Neurological:      General: No focal deficit present.      Mental Status: She is alert and oriented to person, place, and time.      Motor: No weakness.   Psychiatric:         Mood and Affect: Mood is anxious.         Behavior: Behavior normal.         ED Medications  Medications - No data to display    Diagnostic Studies  Results Reviewed       Procedure Component Value Units Date/Time    HS Troponin I 4hr [[id number]]     Lab Status: No result Specimen: Blood     HS Troponin I 2hr [[id number]]     Lab Status: No result Specimen: Blood     HS Troponin 0hr (reflex protocol) [[id number]]  (Normal) Collected: 08/23/24 1517    Lab Status: Final result Specimen: Blood from Arm, Right Updated: 08/23/24 1557     hs TnI 0hr <2 ng/L     B-Type Natriuretic Peptide(BNP) [[id number]]  (Normal) Collected: 08/23/24 1517    Lab Status: Final result Specimen: Blood from Arm, Right Updated: 08/23/24 1556     BNP 51 pg/mL     Comprehensive metabolic panel [[id number]] Collected: 08/23/24 1517    Lab Status: Final result Specimen: Blood from Arm, Right Updated: 08/23/24 1551     Sodium 139 mmol/L      Potassium 4.0 mmol/L      Chloride 104 mmol/L      CO2 26 mmol/L      ANION GAP 9 mmol/L      BUN 13 mg/dL      Creatinine 0.72 mg/dL      Glucose 111 mg/dL      Calcium 9.4 mg/dL      AST 14 U/L      ALT 13 U/L      Alkaline Phosphatase 75 U/L      Total Protein 6.7 g/dL      Albumin 3.9 g/dL      Total Bilirubin 0.26 mg/dL       eGFR 100 ml/min/1.73sq m     Narrative:      National Kid[employer] Foundation guidelines for Chronic Kidney Disease (CKD):     Stage 1 with normal or high GFR (GFR > 90 mL/min/1.73 square meters)    Stage 2 Mild CKD (GFR = 60-89 mL/min/1.73 square meters)    Stage 3A Moderate CKD (GFR = 45-59 mL/min/1.73 square meters)    Stage 3B Moderate CKD (GFR = 30-44 mL/min/1.73 square meters)    Stage 4 Severe CKD (GFR = 15-29 mL/min/1.73 square meters)    Stage 5 End Stage CKD (GFR <15 mL/min/1.73 square meters)  Note: GFR calculation is accurate only with a steady state creatinine    Protime-INR [[id number]]  (Normal) Collected: 08/23/24 1517    Lab Status: Final result Specimen: Blood from Arm, Right Updated: 08/23/24 1547     Protime 12.9 seconds      INR 0.94    Narrative:      INR Therapeutic Range    Indication                                             INR Range      Atrial Fibrillation                                               2.0-3.0  Hypercoagulable State                                    2.0.2.3  Left Ventricular Asist Device                            2.0-3.0  Mechanical Heart Valve                                  -    Aortic(with afib, MI, embolism, HF, LA enlargement,    and/or coagulopathy)                                     2.0-3.0 (2.5-3.5)     Mitral                                                             2.5-3.5  Prosthetic/Bioprosthetic Heart Valve               2.0-3.0  Venous thromboembolism (VTE: VT, PE        2.0-3.0    APTT [[id number]]  (Normal) Collected: 08/23/24 1517    Lab Status: Final result Specimen: Blood from Arm, Right Updated: 08/23/24 1547     PTT 28 seconds     CBC and differential [[id number]]  (Abnormal) Collected: 08/23/24 1517    Lab Status: Final result Specimen: Blood from Arm, Right Updated: 08/23/24 1533     WBC 8.03 Thousand/uL      RBC 5.14 Million/uL      Hemoglobin 13.7 g/dL      Hematocrit 44.5 %      MCV 87 fL      MCH 26.7 pg      MCHC 30.8 g/dL      RDW 14.5 %       MPV 8.5 fL      Platelets 340 Thousands/uL      nRBC 0 /100 WBCs      Segmented % 49 %      Immature Grans % 0 %      Lymphocytes % 41 %      Monocytes % 7 %      Eosinophils Relative 2 %      Basophils Relative 1 %      Absolute Neutrophils 3.89 Thousands/µL      Absolute Immature Grans 0.03 Thousand/uL      Absolute Lymphocytes 3.27 Thousands/µL      Absolute Monocytes 0.58 Thousand/µL      Eosinophils Absolute 0.17 Thousand/µL      Basophils Absolute 0.09 Thousands/µL                    CTA ED chest PE Study    (Results Pending)   VAS lower limb venous duplex study, unilateral/limited    (Results Pending)         Procedures  Procedures      ED Course                             SBIRT 22yo+      Flowsheet Row Most Recent Value   Initial Alcohol Screen: US AUDIT-C     1. How often do you have a drink containing alcohol? 0 Filed at: 08/23/2024 1416   2. How many drinks containing alcohol do you have on a typical day you are drinking?  0 Filed at: 08/23/2024 1416   3a. Male UNDER 65: How often do you have five or more drinks on one occasion? 0 Filed at: 08/23/2024 1416   3b. FEMALE Any Age, or MALE 65+: How often do you have 4 or more drinks on one occassion? 0 Filed at: 08/23/2024 1416   Audit-C Score 0 Filed at: 08/23/2024 1416   ABDIAS: How many times in the past year have you...    Used an illegal drug or used a prescription medication for non-medical reasons? Never Filed at: 08/23/2024 1416            Wells' Criteria for PE      Flowsheet Row Most Recent Value   Wells' Criteria for PE    Clinical signs and symptoms of DVT 3 Filed at: 08/23/2024 1501   PE is primary diagnosis or equally likely 3 Filed at: 08/23/2024 1501   HR >100 0 Filed at: 08/23/2024 1501   Immobilization at least 3 days or Surgery in the previous 4 weeks 0 Filed at: 08/23/2024 1501   Previous, objectively diagnosed PE or DVT 1.5 Filed at: 08/23/2024 1501   Hemoptysis 0 Filed at: 08/23/2024 1501   Malignancy with treatment within 6 months  or palliative 0 Filed at: 08/23/2024 1501   Wells' Criteria Total 7.5 Filed at: 08/23/2024 1501              Medical Decision Making  Given the patients extensive history of pulmonary embolisms and DVTs and clinical presentation she required further workup. Differential diagnosis includes but not limited to PE, DVT, arrythmia, panic attack, and ACS. Vital signs are stable, patient is not hypoxic, not hypotensive, and not tachycardic. Regardless of the diagnosis the patient needs anticoagulation therapy, recommend admission to guide medical management of anticoagulation and bridging to warfarin. Labs and imaging pending at the time of my sign out.     Amount and/or Complexity of Data Reviewed  Labs: ordered.  Radiology: ordered.          Disposition  Final diagnoses:   None     ED Disposition       None          Follow-up Information    None         Patient's Medications   Discharge Prescriptions    No medications on file     No discharge procedures on file.    PDMP Review         Value Time User    PDMP Reviewed  Yes 8/23/2024  2:49 PM Suhas Leon MD             ED Provider  Attending physically available and evaluated Elaine Omer. I managed the patient along with the ED Attending.    Electronically Signed by           Alexsandra Dominguez MD  08/23/24 9194

## 2024-08-24 LAB
ANION GAP SERPL CALCULATED.3IONS-SCNC: 8 MMOL/L (ref 4–13)
BASOPHILS # BLD AUTO: 0.05 THOUSANDS/ÂΜL (ref 0–0.1)
BASOPHILS NFR BLD AUTO: 1 % (ref 0–1)
BUN SERPL-MCNC: 12 MG/DL (ref 5–25)
CALCIUM SERPL-MCNC: 9.2 MG/DL (ref 8.4–10.2)
CHLORIDE SERPL-SCNC: 102 MMOL/L (ref 96–108)
CO2 SERPL-SCNC: 27 MMOL/L (ref 21–32)
CREAT SERPL-MCNC: 0.69 MG/DL (ref 0.6–1.3)
EOSINOPHIL # BLD AUTO: 0.17 THOUSAND/ÂΜL (ref 0–0.61)
EOSINOPHIL NFR BLD AUTO: 3 % (ref 0–6)
ERYTHROCYTE [DISTWIDTH] IN BLOOD BY AUTOMATED COUNT: 14.6 % (ref 11.6–15.1)
GFR SERPL CREATININE-BSD FRML MDRD: 104 ML/MIN/1.73SQ M
GLUCOSE SERPL-MCNC: 97 MG/DL (ref 65–140)
HCT VFR BLD AUTO: 42.9 % (ref 34.8–46.1)
HGB BLD-MCNC: 13.4 G/DL (ref 11.5–15.4)
IMM GRANULOCYTES # BLD AUTO: 0.03 THOUSAND/UL (ref 0–0.2)
IMM GRANULOCYTES NFR BLD AUTO: 1 % (ref 0–2)
INR PPP: 1.14 (ref 0.85–1.19)
LYMPHOCYTES # BLD AUTO: 2.38 THOUSANDS/ÂΜL (ref 0.6–4.47)
LYMPHOCYTES NFR BLD AUTO: 39 % (ref 14–44)
MCH RBC QN AUTO: 26.7 PG (ref 26.8–34.3)
MCHC RBC AUTO-ENTMCNC: 31.2 G/DL (ref 31.4–37.4)
MCV RBC AUTO: 86 FL (ref 82–98)
MONOCYTES # BLD AUTO: 0.47 THOUSAND/ÂΜL (ref 0.17–1.22)
MONOCYTES NFR BLD AUTO: 8 % (ref 4–12)
NEUTROPHILS # BLD AUTO: 2.98 THOUSANDS/ÂΜL (ref 1.85–7.62)
NEUTS SEG NFR BLD AUTO: 48 % (ref 43–75)
NRBC BLD AUTO-RTO: 0 /100 WBCS
PLATELET # BLD AUTO: 317 THOUSANDS/UL (ref 149–390)
PMV BLD AUTO: 8.6 FL (ref 8.9–12.7)
POTASSIUM SERPL-SCNC: 4.1 MMOL/L (ref 3.5–5.3)
PROTHROMBIN TIME: 14.9 SECONDS (ref 12.3–15)
RBC # BLD AUTO: 5.02 MILLION/UL (ref 3.81–5.12)
SODIUM SERPL-SCNC: 137 MMOL/L (ref 135–147)
WBC # BLD AUTO: 6.08 THOUSAND/UL (ref 4.31–10.16)

## 2024-08-24 PROCEDURE — 99232 SBSQ HOSP IP/OBS MODERATE 35: CPT | Performed by: FAMILY MEDICINE

## 2024-08-24 PROCEDURE — 86780 TREPONEMA PALLIDUM: CPT

## 2024-08-24 PROCEDURE — 85610 PROTHROMBIN TIME: CPT

## 2024-08-24 PROCEDURE — 87389 HIV-1 AG W/HIV-1&-2 AB AG IA: CPT

## 2024-08-24 PROCEDURE — 80048 BASIC METABOLIC PNL TOTAL CA: CPT

## 2024-08-24 PROCEDURE — 85025 COMPLETE CBC W/AUTO DIFF WBC: CPT

## 2024-08-24 PROCEDURE — 86705 HEP B CORE ANTIBODY IGM: CPT

## 2024-08-24 PROCEDURE — 86803 HEPATITIS C AB TEST: CPT

## 2024-08-24 RX ORDER — WARFARIN SODIUM 5 MG/1
5 TABLET ORAL
Status: COMPLETED | OUTPATIENT
Start: 2024-08-24 | End: 2024-08-24

## 2024-08-24 RX ORDER — LIDOCAINE 50 MG/G
1 PATCH TOPICAL DAILY
Status: DISCONTINUED | OUTPATIENT
Start: 2024-08-24 | End: 2024-08-27 | Stop reason: HOSPADM

## 2024-08-24 RX ORDER — IBUPROFEN 400 MG/1
400 TABLET, FILM COATED ORAL EVERY 6 HOURS PRN
Status: DISCONTINUED | OUTPATIENT
Start: 2024-08-24 | End: 2024-08-27 | Stop reason: HOSPADM

## 2024-08-24 RX ADMIN — ENOXAPARIN SODIUM 135 MG: 150 INJECTION SUBCUTANEOUS at 08:30

## 2024-08-24 RX ADMIN — WARFARIN SODIUM 5 MG: 5 TABLET ORAL at 17:03

## 2024-08-24 RX ADMIN — ENOXAPARIN SODIUM 135 MG: 150 INJECTION SUBCUTANEOUS at 20:38

## 2024-08-24 RX ADMIN — BUPRENORPHINE AND NALOXONE 8 MG: 8; 2 FILM BUCCAL; SUBLINGUAL at 08:31

## 2024-08-24 RX ADMIN — BUPRENORPHINE AND NALOXONE 8 MG: 8; 2 FILM BUCCAL; SUBLINGUAL at 17:03

## 2024-08-24 RX ADMIN — LIDOCAINE 1 PATCH: 700 PATCH TOPICAL at 08:31

## 2024-08-24 RX ADMIN — WARFARIN SODIUM 5 MG: 5 TABLET ORAL at 20:37

## 2024-08-24 NOTE — ASSESSMENT & PLAN NOTE
Concern for STD exposure from last partner   Vaginal discharge, and redness in the vaginal area per history  Hepatitis B Ig M negative. Syphilis Negative, HIV negative  Hepatitis C antibody Reactive, however is been low reactive since 2016.  Urinalysis normal    - Follow up in outpatient.  - Chlamydia/ Gonorrhea pending

## 2024-08-24 NOTE — PLAN OF CARE
Problem: PAIN - ADULT  Goal: Verbalizes/displays adequate comfort level or baseline comfort level  Description: Interventions:  - Encourage patient to monitor pain and request assistance  - Assess pain using appropriate pain scale  - Administer analgesics based on type and severity of pain and evaluate response  - Implement non-pharmacological measures as appropriate and evaluate response  - Consider cultural and social influences on pain and pain management  - Notify physician/advanced practitioner if interventions unsuccessful or patient reports new pain  Outcome: Progressing     Problem: INFECTION - ADULT  Goal: Absence or prevention of progression during hospitalization  Description: INTERVENTIONS:  - Assess and monitor for signs and symptoms of infection  - Monitor lab/diagnostic results  - Monitor all insertion sites, i.e. indwelling lines, tubes, and drains  - Monitor endotracheal if appropriate and nasal secretions for changes in amount and color  - Anchor appropriate cooling/warming therapies per order  - Administer medications as ordered  - Instruct and encourage patient and family to use good hand hygiene technique  - Identify and instruct in appropriate isolation precautions for identified infection/condition  Outcome: Progressing  Goal: Absence of fever/infection during neutropenic period  Description: INTERVENTIONS:  - Monitor WBC    Outcome: Progressing     Problem: SAFETY ADULT  Goal: Patient will remain free of falls  Description: INTERVENTIONS:  - Educate patient/family on patient safety including physical limitations  - Instruct patient to call for assistance with activity   - Consult OT/PT to assist with strengthening/mobility   - Keep Call bell within reach  - Keep bed low and locked with side rails adjusted as appropriate  - Keep care items and personal belongings within reach  - Initiate and maintain comfort rounds  - Make Fall Risk Sign visible to staff  - Apply yellow socks and bracelet  for high fall risk patients  - Consider moving patient to room near nurses station  Outcome: Progressing  Goal: Maintain or return to baseline ADL function  Description: INTERVENTIONS:  -  Assess patient's ability to carry out ADLs; assess patient's baseline for ADL function and identify physical deficits which impact ability to perform ADLs (bathing, care of mouth/teeth, toileting, grooming, dressing, etc.)  - Assess/evaluate cause of self-care deficits   - Assess range of motion  - Assess patient's mobility; develop plan if impaired  - Assess patient's need for assistive devices and provide as appropriate  - Encourage maximum independence but intervene and supervise when necessary  - Involve family in performance of ADLs  - Assess for home care needs following discharge   - Consider OT consult to assist with ADL evaluation and planning for discharge  - Provide patient education as appropriate  Outcome: Progressing  Goal: Maintains/Returns to pre admission functional level  Description: INTERVENTIONS:  - Perform AM-PAC 6 Click Basic Mobility/ Daily Activity assessment daily.  - Set and communicate daily mobility goal to care team and patient/family/caregiver.   - Collaborate with rehabilitation services on mobility goals if consulted  - Out of bed for toileting  - Record patient progress and toleration of activity level   Outcome: Progressing     Problem: DISCHARGE PLANNING  Goal: Discharge to home or other facility with appropriate resources  Description: INTERVENTIONS:  - Identify barriers to discharge w/patient and caregiver  - Arrange for needed discharge resources and transportation as appropriate  - Identify discharge learning needs (meds, wound care, etc.)  - Arrange for interpretive services to assist at discharge as needed  - Refer to Case Management Department for coordinating discharge planning if the patient needs post-hospital services based on physician/advanced practitioner order or complex needs  related to functional status, cognitive ability, or social support system  Outcome: Progressing     Problem: Knowledge Deficit  Goal: Patient/family/caregiver demonstrates understanding of disease process, treatment plan, medications, and discharge instructions  Description: Complete learning assessment and assess knowledge base.  Interventions:  - Provide teaching at level of understanding  - Provide teaching via preferred learning methods  Outcome: Progressing     Problem: RESPIRATORY - ADULT  Goal: Achieves optimal ventilation and oxygenation  Description: INTERVENTIONS:  - Assess for changes in respiratory status  - Assess for changes in mentation and behavior  - Position to facilitate oxygenation and minimize respiratory effort  - Oxygen administered by appropriate delivery if ordered  - Initiate smoking cessation education as indicated  - Encourage broncho-pulmonary hygiene including cough, deep breathe, Incentive Spirometry  - Assess the need for suctioning and aspirate as needed  - Assess and instruct to report SOB or any respiratory difficulty  - Respiratory Therapy support as indicated  Outcome: Progressing     Problem: HEMATOLOGIC - ADULT  Goal: Maintains hematologic stability  Description: INTERVENTIONS  - Assess for signs and symptoms of bleeding or hemorrhage  - Monitor labs  - Administer supportive blood products/factors as ordered and appropriate  Outcome: Progressing

## 2024-08-24 NOTE — UTILIZATION REVIEW
Initial Clinical Review    OBSERVATION 8/23 @ 1748, CHANGED TO INPATIENT ON 8/23 @ 2229 - PE ON LOVENOX WITH COUMADIN BRIDGING UNTIL INR IS 2-3.      Admission: Date/Time/Statement:   Admission Orders (From admission, onward)       Ordered        08/23/24 2229  INPATIENT ADMISSION  Once            08/23/24 1748  Place in Observation  Once                          Orders Placed This Encounter   Procedures    INPATIENT ADMISSION     Standing Status:   Standing     Number of Occurrences:   1     Order Specific Question:   Level of Care     Answer:   Med Surg [16]     Order Specific Question:   Estimated length of stay     Answer:   More than 2 Midnights     Order Specific Question:   Certification     Answer:   I certify that inpatient services are medically necessary for this patient for a duration of greater than two midnights. See H&P and MD Progress Notes for additional information about the patient's course of treatment.     ED Arrival Information       Expected   -    Arrival   8/23/2024 14:00    Acuity   Urgent              Means of arrival   Walk-In    Escorted by   Self    Service   Family Medicine    Admission type   Emergency              Arrival complaint   dizziness             Chief Complaint   Patient presents with    Dizziness     2 days-with SOB. Checked  this AM, she is not diabetic.  No N/V/D.     Shortness of Breath     Hx of clots         Initial Presentation: 44 y.o. female presents to the ED from home with c/o SOB and dizziness x 2 days, chest tightness, BLE swelling, has not been taking Warfarin d/t insurance issues since 7/28 and no PT/INR checks.  PMH: multiple PE w/ IVC filter, Bilat DVT, asthma, s/p back surgery w/ rods, recent substance abuse rehab, THC smoker.  In the ED VS stable.  Labs - INR 0.94.  Imaging - nonocclusive PE  RUL branch, RV:LV ratio 1.2, 3 mm pulmonary nodules, 4.2 cm fusiform ascending thoracic aortic ectasia. ECG - NSR.  Treated with Sq Lovenox x 1.  On exam 1+  LLE edema, calf tenderness L leg, normal breath sounds, A&O x 3.  Admitted to Observation Status  initially and then changed to INPATIENT status same day with Acute PE, presence of IVC filter, LLE swelling, STD exposure - Lovenox 1mg/kg BID, bridge to Coumadin 5 mf daily with daily INR checks, goal INR 2-3, tele, VAS LLE, Incentive spirometry, poss workup for underlying cause DVT, Suboxone daily, COWS score daily, STD panel, UA.      Anticipated Length of Stay/Certification Statement: Patient will be admitted on an Observation basis with an anticipated length of stay of  less than 2 midnights.       Date: 8/24   Day 2:   Acute PE with RV: LV ratio 1.2 - is on Lovenox BID, LLE VDE pending.  No acute events.  Continues on Lovenox, Coumadin, Suboxone. On exam c/o SOB, lightheadedness, no c/o pain. Lungs are clear.  Minor LLE swelling below knee, bill's sign negative.     ED Triage Vitals   Temperature Pulse Respirations Blood Pressure SpO2 Pain Score   08/23/24 1422 08/23/24 1422 08/23/24 1422 08/23/24 1422 08/23/24 1422 08/23/24 1530   98.1 °F (36.7 °C) 86 18 108/67 96 % No Pain     Weight (last 2 days)       Date/Time Weight    08/23/24 2007 126 (277.6)    08/23/24 1422 129 (283.6)            Vital Signs (last 3 days)       Date/Time Temp Pulse Resp BP MAP (mmHg) SpO2 O2 Device Patient Position - Orthostatic VS Pain    08/25/24 1124 97.1 °F (36.2 °C) 86 20 102/69 -- 97 % None (Room air) Sitting --    08/25/24 1000 -- -- -- -- -- -- -- -- 1    08/25/24 0700 97.6 °F (36.4 °C) 63 18 102/74 79 97 % None (Room air) Lying --    08/25/24 0300 97.8 °F (36.6 °C) 76 18 122/86 93 97 % None (Room air) Lying --    08/24/24 2300 97.6 °F (36.4 °C) 84 18 118/89 104 97 % None (Room air) Sitting --    08/24/24 2000 -- -- -- -- -- -- -- -- 3    08/24/24 1900 98 °F (36.7 °C) 80 18 95/66 71 98 % None (Room air) Sitting --    08/24/24 1500 97.6 °F (36.4 °C) 75 18 109/77 82 98 % None (Room air) Sitting --    08/24/24 1100 97.6 °F (36.4  °C) 77 18 101/73 79 96 % None (Room air) Sitting --    08/24/24 1000 -- -- -- -- -- -- -- -- No Pain    08/24/24 0736 97.6 °F (36.4 °C) 65 18 92/66 -- 95 % None (Room air) Lying --    08/24/24 0400 98.4 °F (36.9 °C) 79 18 102/69 68 97 % None (Room air) Lying --    08/24/24 0000 97.9 °F (36.6 °C) 78 18 110/64 71 98 % None (Room air) Sitting --    08/23/24 2007 97.3 °F (36.3 °C) 76 18 111/75 -- 98 % None (Room air) Sitting --    08/23/24 2000 -- -- -- -- -- -- -- -- 8    08/23/24 1800 -- 71 16 129/52 -- 98 % None (Room air) Sitting No Pain    08/23/24 1600 -- -- -- -- -- -- None (Room air) -- --    08/23/24 1530 -- 84 12 103/64 -- 97 % None (Room air) Sitting No Pain    08/23/24 1422 98.1 °F (36.7 °C) 86 18 108/67 -- 96 % -- -- --              Pertinent Labs/Diagnostic Test Results:   Radiology:  CTA ED chest PE Study   Final Interpretation by Cheo Avila MD (08/23 1725)      Nonocclusive pulmonary embolism within a distal right upper lobar branch.   Elevated RV/LV ratio at 1.2, however unlikely to be a result of a small volume pulmonary emboli.      2 to 3 mm pulmonary nodules.   4.2 cm fusiform ascending thoracic aortic ectasia.   Follow-up chest CT recommended in 12 months for the above findings       VAS VENOUS DUPLEX - LOWER LIMB BILATERAL    (Results Pending)        Cardiology:  No orders to display     GI:  No orders to display           Results from last 7 days   Lab Units 08/24/24  0844 08/23/24  1517   WBC Thousand/uL 6.08 8.03   HEMOGLOBIN g/dL 13.4 13.7   HEMATOCRIT % 42.9 44.5   PLATELETS Thousands/uL 317 340   TOTAL NEUT ABS Thousands/µL 2.98 3.89         Results from last 7 days   Lab Units 08/24/24  0844 08/23/24  1517   SODIUM mmol/L 137 139   POTASSIUM mmol/L 4.1 4.0   CHLORIDE mmol/L 102 104   CO2 mmol/L 27 26   ANION GAP mmol/L 8 9   BUN mg/dL 12 13   CREATININE mg/dL 0.69 0.72   EGFR ml/min/1.73sq m 104 100   CALCIUM mg/dL 9.2 9.4     Results from last 7 days   Lab Units 08/23/24  1513    AST U/L 14   ALT U/L 13   ALK PHOS U/L 75   TOTAL PROTEIN g/dL 6.7   ALBUMIN g/dL 3.9   TOTAL BILIRUBIN mg/dL 0.26         Results from last 7 days   Lab Units 08/24/24  0844 08/23/24  1517   GLUCOSE RANDOM mg/dL 97 111     Results from last 7 days   Lab Units 08/23/24  1517   HS TNI 0HR ng/L <2         Results from last 7 days   Lab Units 08/25/24  0436 08/24/24  0844 08/23/24  1517   PROTIME seconds 16.7* 14.9 12.9   INR  1.33* 1.14 0.94   PTT seconds  --   --  28     Results from last 7 days   Lab Units 08/23/24  1517   BNP pg/mL 51     Results from last 7 days   Lab Units 08/23/24 2054   CLARITY UA  Clear   COLOR UA  Straw   SPEC GRAV UA  1.010   PH UA  6.0   GLUCOSE UA mg/dl Negative   KETONES UA mg/dl Negative   BLOOD UA  Negative   PROTEIN UA mg/dl Negative   NITRITE UA  Negative   BILIRUBIN UA  Negative   UROBILINOGEN UA mg/dL Negative   LEUKOCYTES UA  Negative     ED Treatment-Medication Administration from 08/23/2024 1400 to 08/23/2024 2001         Date/Time Order Dose Route Action     08/23/2024 1611 iohexol (OMNIPAQUE) 350 MG/ML injection (MULTI-DOSE) 90 mL 90 mL Intravenous Given     08/23/2024 1900 enoxaparin (LOVENOX) subcutaneous injection 135 mg 135 mg Subcutaneous Given            Past Medical History:   Diagnosis Date    Asthma     Hx of blood clots     Psoriasis     Spinal stenosis     URI (upper respiratory infection) 04/16/2016     Present on Admission:   Acute pulmonary embolism (HCC)   Asthma   Hx of substance abuse (HCC)   Pulmonary nodules   Left leg swelling   STD exposure      Admitting Diagnosis: Shortness of breath [R06.02]  Dizziness [R42]  Single subsegmental pulmonary embolism without acute cor pulmonale (HCC) [I26.93]  Age/Sex: 44 y.o. female  Admission Orders:  Scheduled Medications:  buprenorphine-naloxone, 8 mg, Sublingual, BID  enoxaparin, 1 mg/kg, Subcutaneous, Q12H EARL  lidocaine, 1 patch, Topical, Daily  warfarin, 10 mg, Oral, Once (warfarin)      Continuous IV  Infusions:     PRN Meds:  ibuprofen, 400 mg, Oral, Q6H PRN    SQ Lovenox  HIB  RPR  Hep panel  Chlamydia  Suboxone  Tele  OOB  Daily PT/INR      Network Utilization Review Department  ATTENTION: Please call with any questions or concerns to [phone] and carefully listen to the prompts so that you are directed to the right person. All voicemails are confidential.   For Discharge needs, contact Care Management DC Support Team at [phone] opt. [phone]  Send all requests for admission clinical reviews, approved or denied determinations and any other requests to dedicated fax number below belonging to the [facility] where the patient is receiving treatment. List of dedicated fax numbers for the Facilities:  FACILITY NAME UR FAX NUMBER   ADMISSION DENIALS (Administrative/Medical Necessity) [phone]   DISCHARGE SUPPORT TEAM (NETWORK) [phone]   PARENT CHILD HEALTH (Maternity/NICU/Pediatrics) [phone]   [facility] [phone]   [facility] [phone]   [facility] [phone]   [facility] [phone]   [facility] [phone]   [facility] [phone]   [facility] [phone]   [facility] [phone]   [facility] [phone]   [facility] [phone]   [facility] [phone]   [facility] [phone]

## 2024-08-24 NOTE — PLAN OF CARE
Problem: PAIN - ADULT  Goal: Verbalizes/displays adequate comfort level or baseline comfort level  Description: Interventions:  - Encourage patient to monitor pain and request assistance  - Assess pain using appropriate pain scale  - Administer analgesics based on type and severity of pain and evaluate response  - Implement non-pharmacological measures as appropriate and evaluate response  - Consider cultural and social influences on pain and pain management  - Notify physician/advanced practitioner if interventions unsuccessful or patient reports new pain  Outcome: Progressing     Problem: INFECTION - ADULT  Goal: Absence or prevention of progression during hospitalization  Description: INTERVENTIONS:  - Assess and monitor for signs and symptoms of infection  - Monitor lab/diagnostic results  - Monitor all insertion sites, i.e. indwelling lines, tubes, and drains  - Monitor endotracheal if appropriate and nasal secretions for changes in amount and color  - [place] appropriate cooling/warming therapies per order  - Administer medications as ordered  - Instruct and encourage patient and family to use good hand hygiene technique  - Identify and instruct in appropriate isolation precautions for identified infection/condition  Outcome: Progressing  Goal: Absence of fever/infection during neutropenic period  Description: INTERVENTIONS:  - Monitor WBC    Outcome: Progressing     Problem: SAFETY ADULT  Goal: Patient will remain free of falls  Description: INTERVENTIONS:  - Educate patient/family on patient safety including physical limitations  - Instruct patient to call for assistance with activity   - Consult OT/PT to assist with strengthening/mobility   - Keep Call bell within reach  - Keep bed low and locked with side rails adjusted as appropriate  - Keep care items and personal belongings within reach  - Initiate and maintain comfort rounds  - Make Fall Risk Sign visible to staff  - Apply yellow socks and bracelet  for high fall risk patients  - Consider moving patient to room near nurses station  Outcome: Progressing  Goal: Maintain or return to baseline ADL function  Description: INTERVENTIONS:  -  Assess patient's ability to carry out ADLs; assess patient's baseline for ADL function and identify physical deficits which impact ability to perform ADLs (bathing, care of mouth/teeth, toileting, grooming, dressing, etc.)  - Assess/evaluate cause of self-care deficits   - Assess range of motion  - Assess patient's mobility; develop plan if impaired  - Assess patient's need for assistive devices and provide as appropriate  - Encourage maximum independence but intervene and supervise when necessary  - Involve family in performance of ADLs  - Assess for home care needs following discharge   - Consider OT consult to assist with ADL evaluation and planning for discharge  - Provide patient education as appropriate  Outcome: Progressing  Goal: Maintains/Returns to pre admission functional level  Description: INTERVENTIONS:  - Perform AM-PAC 6 Click Basic Mobility/ Daily Activity assessment daily.  - Set and communicate daily mobility goal to care team and patient/family/caregiver.   - Collaborate with rehabilitation services on mobility goals if consulted  - Out of bed for toileting  - Record patient progress and toleration of activity level   Outcome: Progressing     Problem: DISCHARGE PLANNING  Goal: Discharge to home or other facility with appropriate resources  Description: INTERVENTIONS:  - Identify barriers to discharge w/patient and caregiver  - Arrange for needed discharge resources and transportation as appropriate  - Identify discharge learning needs (meds, wound care, etc.)  - Arrange for interpretive services to assist at discharge as needed  - Refer to Case Management Department for coordinating discharge planning if the patient needs post-hospital services based on physician/advanced practitioner order or complex needs  related to functional status, cognitive ability, or social support system  Outcome: Progressing     Problem: Knowledge Deficit  Goal: Patient/family/caregiver demonstrates understanding of disease process, treatment plan, medications, and discharge instructions  Description: Complete learning assessment and assess knowledge base.  Interventions:  - Provide teaching at level of understanding  - Provide teaching via preferred learning methods  Outcome: Progressing     Problem: RESPIRATORY - ADULT  Goal: Achieves optimal ventilation and oxygenation  Description: INTERVENTIONS:  - Assess for changes in respiratory status  - Assess for changes in mentation and behavior  - Position to facilitate oxygenation and minimize respiratory effort  - Oxygen administered by appropriate delivery if ordered  - Initiate smoking cessation education as indicated  - Encourage broncho-pulmonary hygiene including cough, deep breathe, Incentive Spirometry  - Assess the need for suctioning and aspirate as needed  - Assess and instruct to report SOB or any respiratory difficulty  - Respiratory Therapy support as indicated  Outcome: Progressing     Problem: HEMATOLOGIC - ADULT  Goal: Maintains hematologic stability  Description: INTERVENTIONS  - Assess for signs and symptoms of bleeding or hemorrhage  - Monitor labs  - Administer supportive blood products/factors as ordered and appropriate  Outcome: Progressing

## 2024-08-24 NOTE — DISCHARGE SUMMARY
Discharge Summary - [facility]    Patient Information: Elaine Omer 44 y.o. female MRN: [id number]  Unit/Bed#: 7T [facility] 713-01 Encounter: [id number]    Discharging Physician / Practitioner: Dr. Amirah Chong  PCP: Destinee Wilson MD  Admission Date: 8/23/2024  Admission Orders (From admission, onward)       Ordered        08/23/24 2229  INPATIENT ADMISSION  Once            08/23/24 1748  Place in Observation  Once                          Discharge Date: 8/27/2024    Reason for Admission:  Nonocclusive pulmonary embolism within a distal right upper lobar branch.      Discharge Diagnoses:     Principal Problem:    Acute pulmonary embolism (HCC)  Active Problems:    Dizziness    Presence of IVC filter    Pulmonary nodules    Asthma    Left leg swelling    Opioid use disorder in remission    Chronic bilateral low back pain without sciatica    Hepatitis C antibody positive in blood  Resolved Problems:    Hx of substance abuse (HCC)    STD exposure        * Acute pulmonary embolism (HCC)  Assessment & Plan  Stable for discharged   History of multiple, recurrent DVTs/PEs starting back in 2013.   IVC placement in 2014 at [facility].  Non adherence to home medication warfarin in the setting of insurance difficulties in the past.  Asymptomatic   Patient does have medical insurance ([employer]) which will allow to get the meds    Plan:   - Continue Start Xarelto 15 mg BID for 21 days, then 20 mg daily.   - Follow up with PCP, who need to prescribe Xarelto 20 mg daily in order to continue with the regimen.  - Consider workup for underlying causes of DVT/PE outpatient.     Dizziness  Assessment & Plan  PT consulted, which recommended Home with Outpatient Physical Therapy.  Orthostatic test normal     - Meclizine 25 mg q8 PRN  - Consider workup for underlining causes in outpatient..        STD exposure-resolved as of 8/27/2024  Assessment & Plan  Concern for STD exposure from last partner   Vaginal  discharge, and redness in the vaginal area per history  Hepatitis B Ig M negative. Syphilis Negative, HIV negative  Hepatitis C antibody Reactive, however is been low reactive since 2016.  Urinalysis normal    - Follow up in outpatient.  - Chlamydia/ Gonorrhea pending       Hx of substance abuse (HCC)-resolved as of 8/27/2024  Assessment & Plan  Recent admission to rehab for substance use, currently on Suboxone 8-2 mg BID     Plan:   - Continue Suboxone 8-2 mg BID   - COWS score q daily    Pulmonary nodules  Assessment & Plan  CT Chest 08/23/2024: 2 to 3 mm pulmonary nodules.  4.2 cm fusiform ascending thoracic aortic ectasi    -Follow up chest CT recommended in 12 months for the above findings in outpatient.     Presence of IVC filter  Assessment & Plan  IVC placement in 2014 at [facility].   See assessment and plan above.    Left leg swelling  Assessment & Plan  Most likely stasis venous.    - LLE VAS Duplex reviewed, no evidence of DVT   - See assessment and plan for PE.    Asthma  Assessment & Plan  Well controlled, patient not on any medication at this time.       -Will monitor for symptoms for now   -See assessment and plan above    Hepatitis C antibody positive in blood  Assessment & Plan  - Hepatitis C reactive, which it's been low reactive since 2016.  -Asymptomatic  - Normal LFT  - HCV IgM core non reactive  - Continue PCP f/u        Opioid use disorder in remission  Assessment & Plan  - In remission   - Continue suboxone 8-2 mg          Consultations During Hospital Stay:  PT    Procedures Performed:   None     Significant Findings / Test Results:   CTA chest scan:   Nonocclusive pulmonary embolism within a distal right upper lobar branch. Elevated RV/LV ratio at 1.2, however unlikely to be a result of a small volume pulmonary emboli. 2 to 3 mm pulmonary nodules. 4.2 cm fusiform ascending thoracic aortic ectasia. Follow-up chest CT recommended in 12 months for the above findings     Incidental Findings:    CTA  chest scan: 2 mm right upper lobe nodule and 3 mm left lower lobe nodule.     Test Results Pending at Discharge (will require follow up):        Outpatient Tests Requested:   None     Outpatient follow-up Requested:  - Hematology   - PCP  - Rheumatology     Complications: none     Hospital Course:     Elaine Omer is a 44 y.o. female patient with pmh of Multiples Pulmonary Embolisms with IVC filter placement, RACHEL DVT and Asthma, who originally presented to the hospital on 8/23/2024 due to SOB and dizziness for two days. The patient was non compliance to warfarin since July 26th due to non insurance.      In the ED course:    -V/S: temp: 36.7 C, Pulse: 86, RR: 18 BP: 108/67   -Labs: PT INR 0.94   - Meds: Lovenox 135 mg SC   -Imagines: CTA Chest with IV contrast: Nonocclusive pulmonary embolism within a distal right upper lobar branch.      Based on the patient's medical history and her clinical presentation, she was admitted for further management of the pulmonary embolism (PE). She was given enoxaparin at a dose of 1 mg/kg and bridged to warfarin at a dose of 5 mg until reaching the therapeutic goal (PT INR 2-3). The following day, the patient started feeling better, and her INR reached the goal by the 3rd day of hospitalization. The patient has medical insurance and her medication was changed to Xarelto, which does not require INR lab checks.     Review of Systems   Constitutional:  Negative for chills and fever.   HENT:  Negative for congestion, ear pain and sore throat.    Eyes:  Negative for pain and visual disturbance.   Respiratory:  Negative for apnea, cough, chest tightness and shortness of breath.    Cardiovascular:  Negative for chest pain and palpitations.   Gastrointestinal:  Negative for abdominal pain and vomiting.   Endocrine: Negative for cold intolerance and heat intolerance.   Genitourinary:  Negative for dysuria and hematuria.   Musculoskeletal:  Negative for arthralgias and back pain.  "  Skin:  Negative for color change and rash.   Neurological:  Negative for dizziness, seizures, syncope, light-headedness, numbness and headaches.   Psychiatric/Behavioral:  Negative for agitation and confusion.    All other systems reviewed and are negative.      Condition at Discharge: stable     Discharge Day Visit / Exam:     Vitals: Blood Pressure: 96/62 (24)  Pulse: 74 (24)  Temperature: 97.5 °F (36.4 °C) (24)  Temp Source: Temporal (24)  Respirations: 16 (24)  Height: 5' 6\" (167.6 cm) (24)  Weight - Scale: 126 kg (277 lb 9.6 oz) (24)  SpO2: 97 % (24)  Exam:   Physical Exam  Vitals reviewed.   Constitutional:       Appearance: She is obese. She is not ill-appearing or toxic-appearing.   HENT:      Head: Normocephalic.      Nose: Nose normal. No congestion or rhinorrhea.      Mouth/Throat:      Mouth: Mucous membranes are moist.   Eyes:      General: No scleral icterus.     Extraocular Movements: Extraocular movements intact.      Conjunctiva/sclera: Conjunctivae normal.      Pupils: Pupils are equal, round, and reactive to light.   Neck:      Vascular: No carotid bruit.   Cardiovascular:      Rate and Rhythm: Normal rate and regular rhythm.      Pulses: Normal pulses.      Heart sounds: Normal heart sounds. No murmur heard.     No friction rub. No gallop.   Pulmonary:      Effort: No respiratory distress.      Breath sounds: No stridor. No wheezing, rhonchi or rales.   Abdominal:      General: Bowel sounds are normal. There is no distension.      Palpations: Abdomen is soft. There is no hepatomegaly, splenomegaly or mass.      Tenderness: There is no right CVA tenderness or left CVA tenderness.      Hernia: No hernia is present.   Musculoskeletal:         General: No tenderness or deformity. Normal range of motion.      Cervical back: Normal range of motion. No rigidity.      Right lower le+ Edema present.      Left " lower le+ Edema present.   Skin:     General: Skin is warm.      Capillary Refill: Capillary refill takes less than 2 seconds.      Coloration: Skin is not jaundiced or pale.      Findings: No bruising, erythema, lesion or rash.   Neurological:      General: No focal deficit present.      Mental Status: She is alert and oriented to person, place, and time. Mental status is at baseline.      Cranial Nerves: No cranial nerve deficit.      Sensory: No sensory deficit.      Motor: No weakness.      Coordination: Coordination normal.      Gait: Gait normal.      Deep Tendon Reflexes: Reflexes normal.   Psychiatric:         Mood and Affect: Mood normal.         Behavior: Behavior normal.         Thought Content: Thought content normal.         Judgment: Judgment normal.         Discussion with Family: none      Discharge instructions/Information to patient and family: patient   See after visit summary for information provided to patient and family.      Discharge Medications:     Medication List        START taking these medications      buprenorphine-naloxone 8-2 mg  Commonly known as: Suboxone  Place 1 Film (8 mg total) under the tongue 2 (two) times a day for 14 days     ibuprofen 600 mg tablet  Commonly known as: MOTRIN  Take 1 tablet (600 mg total) by mouth every 6 (six) hours as needed for mild pain, fever, headaches or moderate pain     lidocaine 5 %  Commonly known as: LIDODERM  Apply 1 patch topically over 12 hours daily Remove & Discard patch within 12 hours or as directed by MD  Start taking on: 2024     meclizine 25 mg tablet  Commonly known as: ANTIVERT  Take 1 tablet (25 mg total) by mouth every 8 (eight) hours as needed for dizziness or nausea     rivaroxaban 15 mg tablet  Commonly known as: Xarelto  Take 1 tablet (15 mg total) by mouth 2 (two) times a day for 21 days            STOP taking these medications      b complex-C-E-zinc tablet     clobetasol 0.05 % ointment  Commonly known as:  TEMOVATE     promethazine-dextromethorphan 6.25-15 mg/5 mL oral syrup  Commonly known as: PHENERGAN-DM                 Provisions for Follow-Up Care:  See after visit summary for information related to follow-up care and any pertinent home health orders.      Disposition:     Home    For Discharges to [facility]:   Not Applicable to this Patient - Not Applicable to this Patient    Planned Readmission: no     Discharge Statement:  I spent 45 minutes discharging the patient. This time was spent on the day of discharge. I had direct contact with the patient on the day of discharge. Greater than 50% of the total time was spent examining patient, answering all patient questions, arranging and discussing plan of care with patient as well as directly providing post-discharge instructions.  Additional time then spent on discharge activities.        Hailey Mosley MD  08/27/24  6:41 PM

## 2024-08-24 NOTE — PROGRESS NOTES
Progress Note    Elaine Omer 44 y.o. female MRN: [id number]  Unit/Bed#: 7T [facility] 713-01 Encounter: [id number]  Admitting Physician: Teo Potts MD  PCP: Destinee Wilson MD  Date of Admission:  8/23/2024  2:07 PM    Assessment and Plan    STD exposure  Assessment & Plan  Concern for STD exposure from last partner   Vaginal discharge, and redness in the vaginal area per history     - STD panel ordered  - UA with reflex    Left leg swelling  Assessment & Plan  Could be in the setting of DVT     - LLE VAS Duplex ordered  - See assessment and plan for PE    Pulmonary nodules  Assessment & Plan  Follow-up chest CT recommended in 12 months for the above findings      Hx of substance abuse (HCC)  Assessment & Plan  Recent admission to rehab for substance use, currently on Suboxone 8-2 mg BID     Plan:   - Continue Suboxone 8-2 mg BID   - COWS score q daily    Presence of IVC filter  Assessment & Plan  IVC placement in 2014 at [facility].   See assessment and plan above    Asthma  Assessment & Plan  Well controlled, patient not on any medication at this time      Will monitor for symptoms for now   See assessment and plan above    * Acute pulmonary embolism (HCC)  Assessment & Plan  History of multiple, recurrent DVTs/PEs starting back in 2013.   IVC placement in 2014 at [facility].  Non adherence to home medication warfarin in the setting of insurance difficulties   SOB and dizziness, left leg swelling present on admission   Neurological exam within normal limits, Vital signs stable   Patients home regimen: Warfarin 7.5 mg, nonadherence     In ED - Nonocclusive pulmonary embolism within a distal right upper lobar branch.  Elevated RV/LV ratio at 1.2, however unlikely to be a result of a small volume pulmonary emboli  2 to 3 mm pulmonary nodules.  4.2 cm fusiform ascending thoracic aortic ectasia.    Plan:   - Lovenox 1mg/kg BID for PE   - Start bridge to Warfarin - 5 mg daily, with daily INR checks, until in  the therapeutic range of 2-3  - If the INR is below 1.5 in the AM we double the warfarin dose to 10 mg PO but if the INR is between 1.5 to 1.9 we keep the same dose, 5 mg PO  - Telemetry orders   - PO hydration, as patient tolerating it well at this time   - VAS lower extremity - left leg   - incentive spirometry   - AM CBC, and CMP   - Consider workup for underlying causes of DVT         VTE Pharmacologic Prophylaxis: VTE Score: 14 High Risk (Score >/= 5) - Pharmacological DVT Prophylaxis Ordered: enoxaparin (Lovenox) and warfarin.    Patient Centered Rounds: I have performed bedside rounds with nursing staff today.    Discussions with Specialists or Other Care Team Provider: N/A    Education and Discussions with Family / Patient: yes    Time Spent for Care: 30 minutes.  More than 50% of total time spent on counseling and coordination of care as described above.    Current Length of Stay: 1 day(s)    Current Patient Status: Inpatient     Discharge Plan: once patient stable    Code Status: Level 1 - Full Code      Subjective:   Patient was evaluated at the bedside. No significant overnight events but she still complains of SOB and lightheadedness. She denies palpitation, chest pain or chest tightness. She denies urinary complaints, N/V/D/C or appetite loss. No other complaints at this time.    Objective:     Vitals:   Temp (24hrs), Av.7 °F (36.5 °C), Min:97.3 °F (36.3 °C), Max:98.4 °F (36.9 °C)    Temp:  [97.3 °F (36.3 °C)-98.4 °F (36.9 °C)] 97.6 °F (36.4 °C)  HR:  [65-79] 75  Resp:  [16-18] 18  BP: ()/(52-77) 109/77  SpO2:  [95 %-98 %] 98 %  Body mass index is 44.81 kg/m².     Input and Output Summary (last 24 hours):       Intake/Output Summary (Last 24 hours) at 2024 1742  Last data filed at 2024 1700  Gross per 24 hour   Intake 240 ml   Output --   Net 240 ml       Physical Exam:     Physical Exam  Constitutional:       General: She is not in acute distress.     Appearance: Normal appearance.  She is obese.   HENT:      Head: Normocephalic and atraumatic.      Right Ear: External ear normal.      Left Ear: External ear normal.      Mouth/Throat:      Mouth: Mucous membranes are moist.   Eyes:      Extraocular Movements: Extraocular movements intact.   Cardiovascular:      Rate and Rhythm: Normal rate and regular rhythm.      Heart sounds: Normal heart sounds. No murmur heard.     No gallop.   Pulmonary:      Effort: No respiratory distress.      Breath sounds: Normal breath sounds. No wheezing.   Abdominal:      Palpations: Abdomen is soft. There is no mass.      Tenderness: There is no abdominal tenderness. There is no guarding.      Comments: Protuberant abdomen   Musculoskeletal:         General: Swelling (minor left leg swelling below the knee) present. No tenderness.      Cervical back: Normal range of motion.      Right lower leg: No edema.      Left lower leg: No edema.      Comments: Homans' sign is negative bilaterally. No calf tenderness bilaterally.   Skin:     General: Skin is warm.      Capillary Refill: Capillary refill takes less than 2 seconds.      Coloration: Skin is not jaundiced.   Neurological:      General: No focal deficit present.      Mental Status: She is alert and oriented to person, place, and time.   Psychiatric:         Mood and Affect: Mood normal.       Additional Data:     Labs:    Results from last 7 days   Lab Units 08/24/24  0844   WBC Thousand/uL 6.08   HEMOGLOBIN g/dL 13.4   HEMATOCRIT % 42.9   PLATELETS Thousands/uL 317   SEGS PCT % 48   LYMPHO PCT % 39   MONO PCT % 8   EOS PCT % 3     Results from last 7 days   Lab Units 08/24/24  0844 08/23/24  1517   POTASSIUM mmol/L 4.1 4.0   CHLORIDE mmol/L 102 104   CO2 mmol/L 27 26   BUN mg/dL 12 13   CREATININE mg/dL 0.69 0.72   CALCIUM mg/dL 9.2 9.4   ALK PHOS U/L  --  75   ALT U/L  --  13   AST U/L  --  14     Results from last 7 days   Lab Units 08/24/24  0844   INR  1.14                 * I Have Reviewed All Lab Data  Listed Above.  * Additional Pertinent Lab Tests Reviewed: All Labs Within Last 24 Hours Reviewed    Imaging:    Imaging Reports Reviewed Today Include: N/A  Imaging Personally Reviewed by Myself Includes:     Recent Cultures (last 7 days):           Last 24 Hours Medication List:   Current Facility-Administered Medications   Medication Dose Route Frequency Provider Last Rate    buprenorphine-naloxone  8 mg Sublingual BID Brad Gamez MD      enoxaparin  1 mg/kg Subcutaneous Q12H EARL Brad Gamez MD      ibuprofen  400 mg Oral Q6H PRN Hailey Mosley MD      lidocaine  1 patch Topical Daily Hailey Mosley MD      warfarin  5 mg Oral Daily (warfarin) Hailey Mosley MD          ** Please Note: Dictation voice to text software may have been used in the creation of this document. **    Teo Potts MD  08/24/24  5:42 PM

## 2024-08-25 LAB
HBV CORE IGM SER QL: NORMAL
HCV AB SER QL: REACTIVE
HIV 1+2 AB+HIV1 P24 AG SERPL QL IA: NORMAL
HIV 2 AB SERPL QL IA: NORMAL
HIV1 AB SERPL QL IA: NORMAL
HIV1 P24 AG SERPL QL IA: NORMAL
INR PPP: 1.33 (ref 0.85–1.19)
PROTHROMBIN TIME: 16.7 SECONDS (ref 12.3–15)
TREPONEMA PALLIDUM IGG+IGM AB [PRESENCE] IN SERUM OR PLASMA BY IMMUNOASSAY: NORMAL

## 2024-08-25 PROCEDURE — 85610 PROTHROMBIN TIME: CPT

## 2024-08-25 PROCEDURE — 99232 SBSQ HOSP IP/OBS MODERATE 35: CPT | Performed by: FAMILY MEDICINE

## 2024-08-25 RX ORDER — LANOLIN ALCOHOL/MO/W.PET/CERES
3 CREAM (GRAM) TOPICAL
Status: DISCONTINUED | OUTPATIENT
Start: 2024-08-25 | End: 2024-08-27 | Stop reason: HOSPADM

## 2024-08-25 RX ORDER — WARFARIN SODIUM 5 MG/1
10 TABLET ORAL
Status: COMPLETED | OUTPATIENT
Start: 2024-08-25 | End: 2024-08-25

## 2024-08-25 RX ORDER — WARFARIN SODIUM 5 MG/1
10 TABLET ORAL
Status: DISCONTINUED | OUTPATIENT
Start: 2024-08-25 | End: 2024-08-25

## 2024-08-25 RX ADMIN — ENOXAPARIN SODIUM 135 MG: 150 INJECTION SUBCUTANEOUS at 20:49

## 2024-08-25 RX ADMIN — BUPRENORPHINE AND NALOXONE 8 MG: 8; 2 FILM BUCCAL; SUBLINGUAL at 17:38

## 2024-08-25 RX ADMIN — BUPRENORPHINE AND NALOXONE 8 MG: 8; 2 FILM BUCCAL; SUBLINGUAL at 09:02

## 2024-08-25 RX ADMIN — ENOXAPARIN SODIUM 135 MG: 150 INJECTION SUBCUTANEOUS at 09:02

## 2024-08-25 RX ADMIN — LIDOCAINE 1 PATCH: 700 PATCH TOPICAL at 09:02

## 2024-08-25 RX ADMIN — WARFARIN SODIUM 10 MG: 5 TABLET ORAL at 15:30

## 2024-08-25 NOTE — PLAN OF CARE
Problem: PAIN - ADULT  Goal: Verbalizes/displays adequate comfort level or baseline comfort level  Description: Interventions:  - Encourage patient to monitor pain and request assistance  - Assess pain using appropriate pain scale  - Administer analgesics based on type and severity of pain and evaluate response  - Implement non-pharmacological measures as appropriate and evaluate response  - Consider cultural and social influences on pain and pain management  - Notify physician/advanced practitioner if interventions unsuccessful or patient reports new pain  Outcome: Progressing     Problem: INFECTION - ADULT  Goal: Absence or prevention of progression during hospitalization  Description: INTERVENTIONS:  - Assess and monitor for signs and symptoms of infection  - Monitor lab/diagnostic results  - Monitor all insertion sites, i.e. indwelling lines, tubes, and drains  - Monitor endotracheal if appropriate and nasal secretions for changes in amount and color  - [place] appropriate cooling/warming therapies per order  - Administer medications as ordered  - Instruct and encourage patient and family to use good hand hygiene technique  - Identify and instruct in appropriate isolation precautions for identified infection/condition  Outcome: Progressing     Problem: HEMATOLOGIC - ADULT  Goal: Maintains hematologic stability  Description: INTERVENTIONS  - Assess for signs and symptoms of bleeding or hemorrhage  - Monitor labs  - Administer supportive blood products/factors as ordered and appropriate  Outcome: Progressing     Problem: RESPIRATORY - ADULT  Goal: Achieves optimal ventilation and oxygenation  Description: INTERVENTIONS:  - Assess for changes in respiratory status  - Assess for changes in mentation and behavior  - Position to facilitate oxygenation and minimize respiratory effort  - Oxygen administered by appropriate delivery if ordered  - Initiate smoking cessation education as indicated  - Encourage  broncho-pulmonary hygiene including cough, deep breathe, Incentive Spirometry  - Assess the need for suctioning and aspirate as needed  - Assess and instruct to report SOB or any respiratory difficulty  - Respiratory Therapy support as indicated  Outcome: Progressing     Problem: Knowledge Deficit  Goal: Patient/family/caregiver demonstrates understanding of disease process, treatment plan, medications, and discharge instructions  Description: Complete learning assessment and assess knowledge base.  Interventions:  - Provide teaching at level of understanding  - Provide teaching via preferred learning methods  Outcome: Progressing     Problem: DISCHARGE PLANNING  Goal: Discharge to home or other facility with appropriate resources  Description: INTERVENTIONS:  - Identify barriers to discharge w/patient and caregiver  - Arrange for needed discharge resources and transportation as appropriate  - Identify discharge learning needs (meds, wound care, etc.)  - Arrange for interpretive services to assist at discharge as needed  - Refer to Case Management Department for coordinating discharge planning if the patient needs post-hospital services based on physician/advanced practitioner order or complex needs related to functional status, cognitive ability, or social support system  Outcome: Progressing

## 2024-08-25 NOTE — PROGRESS NOTES
Progress Note    Elaine Omer 44 y.o. female MRN: [id number]  Unit/Bed#: 7T [facility] 713-01 Encounter: [id number]  Admitting Physician: Hailey Mosley MD  PCP: Destinee Wilson MD  Date of Admission:  8/23/2024  2:07 PM    Assessment and Plan    * Acute pulmonary embolism (HCC)  Assessment & Plan  History of multiple, recurrent DVTs/PEs starting back in 2013.   IVC placement in 2014 at [facility].  Non adherence to home medication warfarin in the setting of insurance difficulties   SOB and dizziness, left leg swelling present on admission   Neurological exam within normal limits, Vital signs stable   Patients home regimen: Warfarin 7.5 mg, nonadherence   In ED - Nonocclusive pulmonary embolism within a distal right upper lobar branch.  Elevated RV/LV ratio at 1.2, however unlikely to be a result of a small volume pulmonary emboli  Continue stable.    Plan:   - Continue Lovenox 1mg/kg BID for PE   - Continue bridge to Warfarin - 5 mg daily, with daily INR checks, until in the therapeutic range of 2-3  -  PT-INR result today 1.3 Not at goal, Warfarin double dose 10 mg for today.   - Continue on Telemetry.  - PO hydration, as patient tolerating it well at this time   - VAS lower extremity - left leg   - incentive spirometry   - AM PT-INR  - Consider workup for underlying causes of DVT     Left leg swelling  Assessment & Plan  Could be in the setting of DVT     - LLE VAS Duplex ordered.  - See assessment and plan for PE.    Presence of IVC filter  Assessment & Plan  IVC placement in 2014 at [facility].   See assessment and plan above.    Hx of substance abuse (HCC)  Assessment & Plan  Recent admission to rehab for substance use, currently on Suboxone 8-2 mg BID     Plan:   - Continue Suboxone 8-2 mg BID   - COWS score q daily.    Pulmonary nodules  Assessment & Plan  CT Chest 08/23/2024: 2 to 3 mm pulmonary nodules.  4.2 cm fusiform ascending thoracic aortic ectasi    Follow-up chest CT recommended in 12 months for the  above findings in outpatient.     STD exposure  Assessment & Plan  Concern for STD exposure from last partner   Vaginal discharge, and redness in the vaginal area per history  Hepatitis B Ig M negative. Syphilis Negative, HIV negative  Hepatitis C antibody Reactive, however is been low reactive since 2016.  Urinalysis normal    - Follow up in outpatient.  - Chlamydia/ Gonorrhea pending       Asthma  Assessment & Plan  Well controlled, patient not on any medication at this time      -Will monitor for symptoms for now   -See assessment and plan above        VTE Pharmacologic Prophylaxis: VTE Score: 14 High Risk (Score >/= 5) - Pharmacological DVT Prophylaxis Ordered: enoxaparin (Lovenox). Sequential Compression Devices Ordered.    Patient Centered Rounds: I have performed bedside rounds with nursing staff today.    Discussions with Specialists or Other Care Team Provider: none     Education and Discussions with Family / Patient: Patient      Time Spent for Care: 30 minutes.  More than 50% of total time spent on counseling and coordination of care as described above.    Current Length of Stay: 2 day(s)    Current Patient Status: Inpatient   Certification Statement: The patient will continue to require additional inpatient hospital stay due to currently plan and therapy. Once she is stable.      Discharge Plan: Once patient is stable.     Code Status: Level 1 - Full Code      Subjective:   Patient was evaluated at the bedside during round. Patient reported to feel okay.  No significant overnight events but she still complains of some SOB and dizziness every time she ate. She denies palpitation, chest pain or chest tightness. She is passing urine and stool well.  Patient is aware of the plan and agreed with it. All the questions were addressed during the round.     Objective:     Vitals:   Temp (24hrs), Av.6 °F (36.4 °C), Min:97.1 °F (36.2 °C), Max:98 °F (36.7 °C)    Temp:  [97.1 °F (36.2 °C)-98 °F (36.7 °C)] 97.1 °F  (36.2 °C)  HR:  [63-86] 86  Resp:  [18-20] 20  BP: ()/(66-89) 102/69  SpO2:  [97 %-98 %] 97 %  Body mass index is 44.81 kg/m².     Input and Output Summary (last 24 hours):       Intake/Output Summary (Last 24 hours) at 2024 1503  Last data filed at 2024 1000  Gross per 24 hour   Intake 480 ml   Output --   Net 480 ml       Physical Exam:     Physical Exam  Vitals reviewed.   Constitutional:       General: She is not in acute distress.     Appearance: She is obese. She is not ill-appearing or diaphoretic.   HENT:      Head: Normocephalic.      Nose: Nose normal. No congestion or rhinorrhea.      Mouth/Throat:      Mouth: Mucous membranes are moist.      Pharynx: Oropharynx is clear.   Eyes:      Extraocular Movements: Extraocular movements intact.      Conjunctiva/sclera: Conjunctivae normal.      Pupils: Pupils are equal, round, and reactive to light.   Cardiovascular:      Rate and Rhythm: Normal rate and regular rhythm.      Pulses: Normal pulses.      Heart sounds: Normal heart sounds. No murmur heard.     No friction rub. No gallop.   Pulmonary:      Effort: Pulmonary effort is normal. No respiratory distress.      Breath sounds: Normal breath sounds. No stridor. No wheezing, rhonchi or rales.   Chest:      Chest wall: Tenderness present.   Abdominal:      General: Abdomen is flat. Bowel sounds are normal. There is no distension.      Palpations: Abdomen is soft. There is no hepatomegaly, splenomegaly or mass.      Tenderness: There is no right CVA tenderness, left CVA tenderness or guarding.   Musculoskeletal:         General: Swelling present. No tenderness, deformity or signs of injury. Normal range of motion.      Cervical back: Normal range of motion. Rigidity present. No tenderness.      Right lower le+ Edema (bellow the knee) present.      Left lower leg: No edema.      Comments: Homans' sign is negative bilaterally. No calf tenderness bilaterally.    Skin:     General: Skin is warm.       Capillary Refill: Capillary refill takes less than 2 seconds.      Coloration: Skin is not jaundiced.      Findings: No erythema, lesion or rash.   Neurological:      General: No focal deficit present.      Mental Status: She is alert. Mental status is at baseline. She is disoriented.      Cranial Nerves: No cranial nerve deficit.      Sensory: No sensory deficit.      Motor: No weakness.      Coordination: Coordination normal.      Gait: Gait normal.      Deep Tendon Reflexes: Reflexes normal.   Psychiatric:         Mood and Affect: Mood normal.         Behavior: Behavior normal.         Thought Content: Thought content normal.         Judgment: Judgment normal.         Additional Data:     Labs:    Results from last 7 days   Lab Units 08/24/24  0844   WBC Thousand/uL 6.08   HEMOGLOBIN g/dL 13.4   HEMATOCRIT % 42.9   PLATELETS Thousands/uL 317   SEGS PCT % 48   LYMPHO PCT % 39   MONO PCT % 8   EOS PCT % 3     Results from last 7 days   Lab Units 08/24/24  0844 08/23/24  1517   POTASSIUM mmol/L 4.1 4.0   CHLORIDE mmol/L 102 104   CO2 mmol/L 27 26   BUN mg/dL 12 13   CREATININE mg/dL 0.69 0.72   CALCIUM mg/dL 9.2 9.4   ALK PHOS U/L  --  75   ALT U/L  --  13   AST U/L  --  14     Results from last 7 days   Lab Units 08/25/24  0436   INR  1.33*                 * I Have Reviewed All Lab Data Listed Above.  * Additional Pertinent Lab Tests Reviewed: All Labs For Current Hospital Admission Reviewed    Imaging:    Imaging Reports Reviewed Today Include: none   Imaging Personally Reviewed by Myself Includes:  none today.     Recent Cultures (last 7 days):         Last 24 Hours Medication List:   Current Facility-Administered Medications   Medication Dose Route Frequency Provider Last Rate    buprenorphine-naloxone  8 mg Sublingual BID Brad Gamez MD      enoxaparin  1 mg/kg Subcutaneous Q12H Replaced by [facility] Brad Gamez MD      ibuprofen  400 mg Oral Q6H PRN Hailey Mosley MD      lidocaine  1 patch  Topical Daily Hailey Mosley MD      melatonin  3 mg Oral HS Kayy Camacho MD      warfarin  10 mg Oral Once (warfarin) MD Hailey Martins MD  08/25/24  3:03 PM

## 2024-08-25 NOTE — PLAN OF CARE
Problem: PAIN - ADULT  Goal: Verbalizes/displays adequate comfort level or baseline comfort level  Description: Interventions:  - Encourage patient to monitor pain and request assistance  - Assess pain using appropriate pain scale  - Administer analgesics based on type and severity of pain and evaluate response  - Implement non-pharmacological measures as appropriate and evaluate response  - Consider cultural and social influences on pain and pain management  - Notify physician/advanced practitioner if interventions unsuccessful or patient reports new pain  Outcome: Progressing     Problem: INFECTION - ADULT  Goal: Absence or prevention of progression during hospitalization  Description: INTERVENTIONS:  - Assess and monitor for signs and symptoms of infection  - Monitor lab/diagnostic results  - Monitor all insertion sites, i.e. indwelling lines, tubes, and drains  - Monitor endotracheal if appropriate and nasal secretions for changes in amount and color  - [place] appropriate cooling/warming therapies per order  - Administer medications as ordered  - Instruct and encourage patient and family to use good hand hygiene technique  - Identify and instruct in appropriate isolation precautions for identified infection/condition  Outcome: Progressing  Goal: Absence of fever/infection during neutropenic period  Description: INTERVENTIONS:  - Monitor WBC    Outcome: Progressing     Problem: SAFETY ADULT  Goal: Patient will remain free of falls  Description: INTERVENTIONS:  - Educate patient/family on patient safety including physical limitations  - Instruct patient to call for assistance with activity   - Consult OT/PT to assist with strengthening/mobility   - Keep Call bell within reach  - Keep bed low and locked with side rails adjusted as appropriate  - Keep care items and personal belongings within reach  - Initiate and maintain comfort rounds  - Make Fall Risk Sign visible to staff  - Apply yellow socks and bracelet  for high fall risk patients  - Consider moving patient to room near nurses station  Outcome: Progressing  Goal: Maintain or return to baseline ADL function  Description: INTERVENTIONS:  -  Assess patient's ability to carry out ADLs; assess patient's baseline for ADL function and identify physical deficits which impact ability to perform ADLs (bathing, care of mouth/teeth, toileting, grooming, dressing, etc.)  - Assess/evaluate cause of self-care deficits   - Assess range of motion  - Assess patient's mobility; develop plan if impaired  - Assess patient's need for assistive devices and provide as appropriate  - Encourage maximum independence but intervene and supervise when necessary  - Involve family in performance of ADLs  - Assess for home care needs following discharge   - Consider OT consult to assist with ADL evaluation and planning for discharge  - Provide patient education as appropriate  Outcome: Progressing  Goal: Maintains/Returns to pre admission functional level  Description: INTERVENTIONS:  - Perform AM-PAC 6 Click Basic Mobility/ Daily Activity assessment daily.  - Set and communicate daily mobility goal to care team and patient/family/caregiver.   - Collaborate with rehabilitation services on mobility goals if consulted  - Out of bed for toileting  - Record patient progress and toleration of activity level   Outcome: Progressing     Problem: DISCHARGE PLANNING  Goal: Discharge to home or other facility with appropriate resources  Description: INTERVENTIONS:  - Identify barriers to discharge w/patient and caregiver  - Arrange for needed discharge resources and transportation as appropriate  - Identify discharge learning needs (meds, wound care, etc.)  - Arrange for interpretive services to assist at discharge as needed  - Refer to Case Management Department for coordinating discharge planning if the patient needs post-hospital services based on physician/advanced practitioner order or complex needs  related to functional status, cognitive ability, or social support system  Outcome: Progressing     Problem: Knowledge Deficit  Goal: Patient/family/caregiver demonstrates understanding of disease process, treatment plan, medications, and discharge instructions  Description: Complete learning assessment and assess knowledge base.  Interventions:  - Provide teaching at level of understanding  - Provide teaching via preferred learning methods  Outcome: Progressing     Problem: RESPIRATORY - ADULT  Goal: Achieves optimal ventilation and oxygenation  Description: INTERVENTIONS:  - Assess for changes in respiratory status  - Assess for changes in mentation and behavior  - Position to facilitate oxygenation and minimize respiratory effort  - Oxygen administered by appropriate delivery if ordered  - Initiate smoking cessation education as indicated  - Encourage broncho-pulmonary hygiene including cough, deep breathe, Incentive Spirometry  - Assess the need for suctioning and aspirate as needed  - Assess and instruct to report SOB or any respiratory difficulty  - Respiratory Therapy support as indicated  Outcome: Progressing     Problem: HEMATOLOGIC - ADULT  Goal: Maintains hematologic stability  Description: INTERVENTIONS  - Assess for signs and symptoms of bleeding or hemorrhage  - Monitor labs  - Administer supportive blood products/factors as ordered and appropriate  Outcome: Progressing

## 2024-08-26 ENCOUNTER — APPOINTMENT (INPATIENT)
Dept: NON INVASIVE DIAGNOSTICS | Facility: HOSPITAL | Age: 44
DRG: 134 | End: 2024-08-26
Payer: COMMERCIAL

## 2024-08-26 PROBLEM — R42 DIZZINESS: Status: ACTIVE | Noted: 2024-08-26

## 2024-08-26 LAB
ANION GAP SERPL CALCULATED.3IONS-SCNC: 9 MMOL/L (ref 4–13)
BASOPHILS # BLD AUTO: 0.06 THOUSANDS/ÂΜL (ref 0–0.1)
BASOPHILS NFR BLD AUTO: 1 % (ref 0–1)
BUN SERPL-MCNC: 14 MG/DL (ref 5–25)
C TRACH DNA SPEC QL NAA+PROBE: NEGATIVE
CALCIUM SERPL-MCNC: 8.6 MG/DL (ref 8.4–10.2)
CHLORIDE SERPL-SCNC: 104 MMOL/L (ref 96–108)
CO2 SERPL-SCNC: 24 MMOL/L (ref 21–32)
CREAT SERPL-MCNC: 0.66 MG/DL (ref 0.6–1.3)
EOSINOPHIL # BLD AUTO: 0.14 THOUSAND/ÂΜL (ref 0–0.61)
EOSINOPHIL NFR BLD AUTO: 2 % (ref 0–6)
ERYTHROCYTE [DISTWIDTH] IN BLOOD BY AUTOMATED COUNT: 14.5 % (ref 11.6–15.1)
GFR SERPL CREATININE-BSD FRML MDRD: 106 ML/MIN/1.73SQ M
GLUCOSE SERPL-MCNC: 99 MG/DL (ref 65–140)
HCT VFR BLD AUTO: 40.3 % (ref 34.8–46.1)
HGB BLD-MCNC: 12.6 G/DL (ref 11.5–15.4)
IMM GRANULOCYTES # BLD AUTO: 0.03 THOUSAND/UL (ref 0–0.2)
IMM GRANULOCYTES NFR BLD AUTO: 0 % (ref 0–2)
INR PPP: 1.87 (ref 0.85–1.19)
LYMPHOCYTES # BLD AUTO: 3.29 THOUSANDS/ÂΜL (ref 0.6–4.47)
LYMPHOCYTES NFR BLD AUTO: 46 % (ref 14–44)
MCH RBC QN AUTO: 26.6 PG (ref 26.8–34.3)
MCHC RBC AUTO-ENTMCNC: 31.3 G/DL (ref 31.4–37.4)
MCV RBC AUTO: 85 FL (ref 82–98)
MONOCYTES # BLD AUTO: 0.52 THOUSAND/ÂΜL (ref 0.17–1.22)
MONOCYTES NFR BLD AUTO: 7 % (ref 4–12)
N GONORRHOEA DNA SPEC QL NAA+PROBE: NEGATIVE
NEUTROPHILS # BLD AUTO: 3.23 THOUSANDS/ÂΜL (ref 1.85–7.62)
NEUTS SEG NFR BLD AUTO: 44 % (ref 43–75)
NRBC BLD AUTO-RTO: 0 /100 WBCS
PLATELET # BLD AUTO: 319 THOUSANDS/UL (ref 149–390)
PMV BLD AUTO: 8.7 FL (ref 8.9–12.7)
POTASSIUM SERPL-SCNC: 4 MMOL/L (ref 3.5–5.3)
PROTHROMBIN TIME: 21.7 SECONDS (ref 12.3–15)
RBC # BLD AUTO: 4.73 MILLION/UL (ref 3.81–5.12)
SODIUM SERPL-SCNC: 137 MMOL/L (ref 135–147)
WBC # BLD AUTO: 7.27 THOUSAND/UL (ref 4.31–10.16)

## 2024-08-26 PROCEDURE — 87522 HEPATITIS C REVRS TRNSCRPJ: CPT

## 2024-08-26 PROCEDURE — 97163 PT EVAL HIGH COMPLEX 45 MIN: CPT

## 2024-08-26 PROCEDURE — 85610 PROTHROMBIN TIME: CPT

## 2024-08-26 PROCEDURE — 87521 HEPATITIS C PROBE&RVRS TRNSC: CPT

## 2024-08-26 PROCEDURE — 80048 BASIC METABOLIC PNL TOTAL CA: CPT

## 2024-08-26 PROCEDURE — 85025 COMPLETE CBC W/AUTO DIFF WBC: CPT

## 2024-08-26 PROCEDURE — 93970 EXTREMITY STUDY: CPT

## 2024-08-26 PROCEDURE — 93970 EXTREMITY STUDY: CPT | Performed by: INTERNAL MEDICINE

## 2024-08-26 PROCEDURE — 99232 SBSQ HOSP IP/OBS MODERATE 35: CPT | Performed by: FAMILY MEDICINE

## 2024-08-26 RX ORDER — WARFARIN SODIUM 5 MG/1
7.5 TABLET ORAL
Status: DISCONTINUED | OUTPATIENT
Start: 2024-08-26 | End: 2024-08-26

## 2024-08-26 RX ADMIN — IBUPROFEN 400 MG: 400 TABLET, FILM COATED ORAL at 08:09

## 2024-08-26 RX ADMIN — BUPRENORPHINE AND NALOXONE 8 MG: 8; 2 FILM BUCCAL; SUBLINGUAL at 17:41

## 2024-08-26 RX ADMIN — RIVAROXABAN 15 MG: 15 TABLET, FILM COATED ORAL at 18:17

## 2024-08-26 RX ADMIN — BUPRENORPHINE AND NALOXONE 8 MG: 8; 2 FILM BUCCAL; SUBLINGUAL at 08:10

## 2024-08-26 RX ADMIN — ENOXAPARIN SODIUM 135 MG: 150 INJECTION SUBCUTANEOUS at 08:47

## 2024-08-26 RX ADMIN — LIDOCAINE 1 PATCH: 700 PATCH TOPICAL at 08:11

## 2024-08-26 NOTE — PLAN OF CARE
Problem: PAIN - ADULT  Goal: Verbalizes/displays adequate comfort level or baseline comfort level  Description: Interventions:  - Encourage patient to monitor pain and request assistance  - Assess pain using appropriate pain scale  - Administer analgesics based on type and severity of pain and evaluate response  - Implement non-pharmacological measures as appropriate and evaluate response  - Consider cultural and social influences on pain and pain management  - Notify physician/advanced practitioner if interventions unsuccessful or patient reports new pain  Outcome: Progressing     Problem: INFECTION - ADULT  Goal: Absence or prevention of progression during hospitalization  Description: INTERVENTIONS:  - Assess and monitor for signs and symptoms of infection  - Monitor lab/diagnostic results  - Monitor all insertion sites, i.e. indwelling lines, tubes, and drains  - Monitor endotracheal if appropriate and nasal secretions for changes in amount and color  - [place] appropriate cooling/warming therapies per order  - Administer medications as ordered  - Instruct and encourage patient and family to use good hand hygiene technique  - Identify and instruct in appropriate isolation precautions for identified infection/condition  Outcome: Progressing  Goal: Absence of fever/infection during neutropenic period  Description: INTERVENTIONS:  - Monitor WBC    Outcome: Progressing     Problem: SAFETY ADULT  Goal: Patient will remain free of falls  Description: INTERVENTIONS:  - Educate patient/family on patient safety including physical limitations  - Instruct patient to call for assistance with activity   - Consult OT/PT to assist with strengthening/mobility   - Keep Call bell within reach  - Keep bed low and locked with side rails adjusted as appropriate  - Keep care items and personal belongings within reach  - Initiate and maintain comfort rounds  - Make Fall Risk Sign visible to staff    - Apply yellow socks and  bracelet for high fall risk patients  - Consider moving patient to room near nurses station  Outcome: Progressing  Goal: Maintain or return to baseline ADL function  Description: INTERVENTIONS:  -  Assess patient's ability to carry out ADLs; assess patient's baseline for ADL function and identify physical deficits which impact ability to perform ADLs (bathing, care of mouth/teeth, toileting, grooming, dressing, etc.)  - Assess/evaluate cause of self-care deficits   - Assess range of motion  - Assess patient's mobility; develop plan if impaired  - Assess patient's need for assistive devices and provide as appropriate  - Encourage maximum independence but intervene and supervise when necessary  - Involve family in performance of ADLs  - Assess for home care needs following discharge   - Consider OT consult to assist with ADL evaluation and planning for discharge  - Provide patient education as appropriate  Outcome: Progressing  Goal: Maintains/Returns to pre admission functional level  Description: INTERVENTIONS:  - Perform AM-PAC 6 Click Basic Mobility/ Daily Activity assessment daily.  - Set and communicate daily mobility goal to care team and patient/family/caregiver.   - Collaborate with rehabilitation services on mobility goals if consulted    - Ambulate patient 4 times a day    - Out of bed for toileting  - Record patient progress and toleration of activity level   Outcome: Progressing     Problem: DISCHARGE PLANNING  Goal: Discharge to home or other facility with appropriate resources  Description: INTERVENTIONS:  - Identify barriers to discharge w/patient and caregiver  - Arrange for needed discharge resources and transportation as appropriate  - Identify discharge learning needs (meds, wound care, etc.)  - Arrange for interpretive services to assist at discharge as needed  - Refer to Case Management Department for coordinating discharge planning if the patient needs post-hospital services based on  physician/advanced practitioner order or complex needs related to functional status, cognitive ability, or social support system  Outcome: Progressing     Problem: RESPIRATORY - ADULT  Goal: Achieves optimal ventilation and oxygenation  Description: INTERVENTIONS:  - Assess for changes in respiratory status  - Assess for changes in mentation and behavior  - Position to facilitate oxygenation and minimize respiratory effort  - Oxygen administered by appropriate delivery if ordered  - Initiate smoking cessation education as indicated  - Encourage broncho-pulmonary hygiene including cough, deep breathe, Incentive Spirometry  - Assess the need for suctioning and aspirate as needed  - Assess and instruct to report SOB or any respiratory difficulty  - Respiratory Therapy support as indicated  Outcome: Progressing     Problem: HEMATOLOGIC - ADULT  Goal: Maintains hematologic stability  Description: INTERVENTIONS  - Assess for signs and symptoms of bleeding or hemorrhage  - Monitor labs  - Administer supportive blood products/factors as ordered and appropriate  Outcome: Progressing

## 2024-08-26 NOTE — QUICK NOTE
Patient was seen and assessed at bedside.   Vital signs within normal limits.   Patient was stable, pleasant, speaking comfortably, states overall improving. Does endorse left leg pain that is overall improving. Also endorses consistent dizziness with head movement. Will discuss with the day team to consider PT for vestibular therapy. Denies any chest pain, shortness of breath, tinnitus, recent illness, weakness, bowel or bladder symptoms, and visual disturbances.   Answered all questions.  Updated on plan, amenable to continue monitoring INR with goal of 2-3 and pending Doppler of bilateral lower extremities tomorrow.   No signs of respiratory distress, altered mental status, breath sounds clear bilaterally, no abnormal heart sounds, no swelling of the legs, no chest pain, or shortness of breath.  No skin changes.   Will continue to monitor overnight and revaluate in the morning.

## 2024-08-26 NOTE — NURSING NOTE
"Pt states she thought she was going home this evening. Text to physician states that she isn't being discharged. Pt states \"I can get the meds, it isn't a problem\" . Return text sent to MD who states that they will be up to talk to her. Pt remains cooperative with care.  "

## 2024-08-26 NOTE — UTILIZATION REVIEW
NOTIFICATION OF INPATIENT MEDICAL ADMISSION   AUTHORIZATION REQUEST   SERVICING FACILITY:   [facility]  [address] [place] [address]  Tax ID: [id number]  NPI: [id number] ATTENDING PROVIDER:  Attending Name and NPI#: Amirah Chong Md [[id number]]  Address: [address] [place] [address]  Phone: [phone]     ADMISSION INFORMATION:  Place of Service: Inpatient [facility]  Place of Service Code: 21  Inpatient Admission Date/Time: 8/23/24 10:29 PM  Discharge Date/Time: No discharge date for patient encounter.  Admitting Diagnosis Code/Description:  Shortness of breath [R06.02]  Dizziness [R42]  Single subsegmental pulmonary embolism without acute cor pulmonale (HCC) [I26.93]     UTILIZATION REVIEW CONTACT:  Alexandra Valles, Utilization   Network Utilization Review Department  Phone: [phone]  Fax [phone]  Email: [email or web address]  Contact for approvals/pending authorizations, clinical reviews, and discharge.     PHYSICIAN ADVISORY SERVICES:  Medical Necessity Denial & Ahzf-hq-Xrnj Review  Phone: [phone]  Fax: [phone]  Email: [email or web address]     DISCHARGE SUPPORT TEAM:  For Patients Discharge Needs & Updates  Phone: [phone] opt. 2 Fax: [phone]  Email: [email or web address]

## 2024-08-26 NOTE — PLAN OF CARE
Problem: PAIN - ADULT  Goal: Verbalizes/displays adequate comfort level or baseline comfort level  Description: Interventions:  - Encourage patient to monitor pain and request assistance  - Assess pain using appropriate pain scale  - Administer analgesics based on type and severity of pain and evaluate response  - Implement non-pharmacological measures as appropriate and evaluate response  - Consider cultural and social influences on pain and pain management  - Notify physician/advanced practitioner if interventions unsuccessful or patient reports new pain  Outcome: Progressing     Problem: INFECTION - ADULT  Goal: Absence or prevention of progression during hospitalization  Description: INTERVENTIONS:  - Assess and monitor for signs and symptoms of infection  - Monitor lab/diagnostic results  - Monitor all insertion sites, i.e. indwelling lines, tubes, and drains  - Monitor endotracheal if appropriate and nasal secretions for changes in amount and color  - [place] appropriate cooling/warming therapies per order  - Administer medications as ordered  - Instruct and encourage patient and family to use good hand hygiene technique  - Identify and instruct in appropriate isolation precautions for identified infection/condition  Outcome: Progressing  Goal: Absence of fever/infection during neutropenic period  Description: INTERVENTIONS:  - Monitor WBC    Outcome: Progressing     Problem: SAFETY ADULT  Goal: Patient will remain free of falls  Description: INTERVENTIONS:  - Educate patient/family on patient safety including physical limitations  - Instruct patient to call for assistance with activity   - Consult OT/PT to assist with strengthening/mobility   - Keep Call bell within reach  - Keep bed low and locked with side rails adjusted as appropriate  - Keep care items and personal belongings within reach  - Initiate and maintain comfort rounds  - Make Fall Risk Sign visible to staff  - Apply yellow socks and bracelet  for high fall risk patients  - Consider moving patient to room near nurses station  Outcome: Progressing  Goal: Maintain or return to baseline ADL function  Description: INTERVENTIONS:  -  Assess patient's ability to carry out ADLs; assess patient's baseline for ADL function and identify physical deficits which impact ability to perform ADLs (bathing, care of mouth/teeth, toileting, grooming, dressing, etc.)  - Assess/evaluate cause of self-care deficits   - Assess range of motion  - Assess patient's mobility; develop plan if impaired  - Assess patient's need for assistive devices and provide as appropriate  - Encourage maximum independence but intervene and supervise when necessary  - Involve family in performance of ADLs  - Assess for home care needs following discharge   - Consider OT consult to assist with ADL evaluation and planning for discharge  - Provide patient education as appropriate  Outcome: Progressing  Goal: Maintains/Returns to pre admission functional level  Description: INTERVENTIONS:  - Perform AM-PAC 6 Click Basic Mobility/ Daily Activity assessment daily.  - Set and communicate daily mobility goal to care team and patient/family/caregiver.   - Collaborate with rehabilitation services on mobility goals if consulted  - Out of bed for toileting  - Record patient progress and toleration of activity level   Outcome: Progressing     Problem: DISCHARGE PLANNING  Goal: Discharge to home or other facility with appropriate resources  Description: INTERVENTIONS:  - Identify barriers to discharge w/patient and caregiver  - Arrange for needed discharge resources and transportation as appropriate  - Identify discharge learning needs (meds, wound care, etc.)  - Arrange for interpretive services to assist at discharge as needed  - Refer to Case Management Department for coordinating discharge planning if the patient needs post-hospital services based on physician/advanced practitioner order or complex needs  related to functional status, cognitive ability, or social support system  Outcome: Progressing     Problem: Knowledge Deficit  Goal: Patient/family/caregiver demonstrates understanding of disease process, treatment plan, medications, and discharge instructions  Description: Complete learning assessment and assess knowledge base.  Interventions:  - Provide teaching at level of understanding  - Provide teaching via preferred learning methods  Outcome: Progressing     Problem: RESPIRATORY - ADULT  Goal: Achieves optimal ventilation and oxygenation  Description: INTERVENTIONS:  - Assess for changes in respiratory status  - Assess for changes in mentation and behavior  - Position to facilitate oxygenation and minimize respiratory effort  - Oxygen administered by appropriate delivery if ordered  - Initiate smoking cessation education as indicated  - Encourage broncho-pulmonary hygiene including cough, deep breathe, Incentive Spirometry  - Assess the need for suctioning and aspirate as needed  - Assess and instruct to report SOB or any respiratory difficulty  - Respiratory Therapy support as indicated  Outcome: Progressing     Problem: RESPIRATORY - ADULT  Goal: Achieves optimal ventilation and oxygenation  Description: INTERVENTIONS:  - Assess for changes in respiratory status  - Assess for changes in mentation and behavior  - Position to facilitate oxygenation and minimize respiratory effort  - Oxygen administered by appropriate delivery if ordered  - Initiate smoking cessation education as indicated  - Encourage broncho-pulmonary hygiene including cough, deep breathe, Incentive Spirometry  - Assess the need for suctioning and aspirate as needed  - Assess and instruct to report SOB or any respiratory difficulty  - Respiratory Therapy support as indicated  Outcome: Progressing     Problem: HEMATOLOGIC - ADULT  Goal: Maintains hematologic stability  Description: INTERVENTIONS  - Assess for signs and symptoms of bleeding  or hemorrhage  - Monitor labs  - Administer supportive blood products/factors as ordered and appropriate  Outcome: Progressing

## 2024-08-26 NOTE — CASE MANAGEMENT
Case Management Assessment & Discharge Planning Note    Patient name Elaine Omer  Location [facility] 713/[facility] 713-01 MRN [id number]  : 1980 Date 2024       Current Admission Date: 2024  Current Admission Diagnosis:Acute pulmonary embolism (HCC)   Patient Active Problem List    Diagnosis Date Noted Date Diagnosed    Pulmonary nodules 2024     Left leg swelling 2024     STD exposure 2024     Acute pulmonary embolism (HCC) 2022     Weight loss, unintentional 2022     Hx of substance abuse (HCC) 2022     DVT of lower extremity, bilateral (HCC) 2016     Asthma 2016     Mass on back 2016     Hx of smoking 2016     Psoriasis 2016     Hx pulmonary embolism 2016     Low mean corpuscular volume (MCV) 2016     Presence of IVC filter 2016       LOS (days): 3  Geometric Mean LOS (GMLOS) (days):   Days to GMLOS:     OBJECTIVE:    Risk of Unplanned Readmission Score: 7.61         Current admission status: Inpatient  Referral Reason: Medication Assistance    Preferred Pharmacy:   [id number] - [place], [place] - [address] [place] [id number]  Phone: [phone] Fax: [phone]    Primary Care Provider: Destinee Wilson MD    Primary Insurance: [employer]  Secondary Insurance:     ASSESSMENT:  Active Health Care Proxies    There are no active Health Care Proxies on file.         Readmission Root Cause  30 Day Readmission: No    Patient Information  Admitted from:: Home  Mental Status: Alert  During Assessment patient was accompanied by: Not accompanied during assessment  Assessment information provided by:: Patient  Primary Caregiver: Self  Support Systems: Self  County of Residence: [place]  What city do you live in?: [place]  Home entry access options. Select all that apply.: Stairs  Number of steps to enter home.:  (5 flights)  Do the steps have  railings?: Yes  Type of Current Residence: Apartment  Floor Level: 5  Upon entering residence, is there a bedroom on the main floor (no further steps)?: Yes  Upon entering residence, is there a bathroom on the main floor (no further steps)?: Yes  Living Arrangements: Lives Alone  Is patient a ?: No    Activities of Daily Living Prior to Admission  Functional Status: Independent  Completes ADLs independently?: Yes  Ambulates independently?: Yes  Does patient use assisted devices?: No  Does patient currently own DME?: No  Does patient have a history of Outpatient Therapy (PT/OT)?: No  Does the patient have a history of Short-Term Rehab?: No  Does patient have a history of HHC?: No  Does patient currently have HHC?: No         Patient Information Continued  Income Source: Unknown  Does patient have prescription coverage?: Yes  Does patient receive dialysis treatments?: No  Does patient have a history of substance abuse?: Yes  History of Withdrawal Symptoms: Denies past symptoms  Is patient currently in treatment for substance abuse?: N/A - sober  Does patient have a history of Mental Health Diagnosis?: No         Means of Transportation  Means of Transport to Appts:: None      Social Determinants of Health (SDOH)      Flowsheet Row Most Recent Value   Housing Stability    In the last 12 months, was there a time when you were not able to pay the mortgage or rent on time? N   In the past 12 months, how many times have you moved where you were living? 2   At any time in the past 12 months, were you homeless or living in a shelter (including now)? Y   Transportation Needs    In the past 12 months, has lack of transportation kept you from medical appointments or from getting medications? yes   In the past 12 months, has lack of transportation kept you from meetings, work, or from getting things needed for daily living? Yes   Food Insecurity    Within the past 12 months, you worried that your food would run out before  you got the money to buy more. Often true   Within the past 12 months, the food you bought just didn't last and you didn't have money to get more. Often true   Utilities    In the past 12 months has the electric, gas, oil, or water company threatened to shut off services in your home? No            DISCHARGE DETAILS:    Discharge planning discussed with:: Patient  Freedom of Choice: Yes     CM contacted family/caregiver?: No- see comments  Were Treatment Team discharge recommendations reviewed with patient/caregiver?: Yes    Other Referral/Resources/Interventions Provided:  Financial Resources Provided: Financial Counselor  Interventions: Other (Specify) (Med price check)    Verified with Hospital Insurance Verification that patient has active insurance.  Has [place] first.  Email copy of card received.    Call to [facility] [address] [phone] and provided with Insurance info.  Zarelto price check and cost is 0.  [facility] and it will be inn tomorrow.    Met with patient at bedside and explained insurance and provided with a copy of card.  Explained to patient [facility] can not get medication until tomorrow.  Updated Medical team same

## 2024-08-27 ENCOUNTER — TRANSITIONAL CARE MANAGEMENT (OUTPATIENT)
Dept: FAMILY MEDICINE CLINIC | Facility: CLINIC | Age: 44
End: 2024-08-27

## 2024-08-27 VITALS
BODY MASS INDEX: 44.61 KG/M2 | SYSTOLIC BLOOD PRESSURE: 96 MMHG | HEIGHT: 66 IN | OXYGEN SATURATION: 97 % | DIASTOLIC BLOOD PRESSURE: 62 MMHG | TEMPERATURE: 97.5 F | WEIGHT: 277.6 LBS | RESPIRATION RATE: 16 BRPM | HEART RATE: 74 BPM

## 2024-08-27 PROBLEM — R76.8 HEPATITIS C ANTIBODY POSITIVE IN BLOOD: Status: ACTIVE | Noted: 2024-08-27

## 2024-08-27 PROBLEM — F19.11 HX OF SUBSTANCE ABUSE (HCC): Status: RESOLVED | Noted: 2022-09-19 | Resolved: 2024-08-27

## 2024-08-27 PROBLEM — Z20.2 STD EXPOSURE: Status: RESOLVED | Noted: 2024-08-23 | Resolved: 2024-08-27

## 2024-08-27 PROBLEM — M54.50 CHRONIC BILATERAL LOW BACK PAIN WITHOUT SCIATICA: Status: ACTIVE | Noted: 2024-08-27

## 2024-08-27 PROBLEM — F11.91 OPIOID USE DISORDER IN REMISSION: Status: ACTIVE | Noted: 2024-08-27

## 2024-08-27 PROBLEM — G89.29 CHRONIC BILATERAL LOW BACK PAIN WITHOUT SCIATICA: Status: ACTIVE | Noted: 2024-08-27

## 2024-08-27 RX ORDER — BUPRENORPHINE AND NALOXONE 8; 2 MG/1; MG/1
8 FILM, SOLUBLE BUCCAL; SUBLINGUAL 2 TIMES DAILY
Qty: 28 FILM | Refills: 0 | Status: SHIPPED | OUTPATIENT
Start: 2024-08-27 | End: 2024-09-10

## 2024-08-27 RX ORDER — LIDOCAINE 50 MG/G
1 PATCH TOPICAL DAILY
Qty: 10 PATCH | Refills: 0 | Status: SHIPPED | OUTPATIENT
Start: 2024-08-28

## 2024-08-27 RX ORDER — MECLIZINE HYDROCHLORIDE 25 MG/1
25 TABLET ORAL EVERY 8 HOURS PRN
Qty: 30 TABLET | Refills: 0 | Status: SHIPPED | OUTPATIENT
Start: 2024-08-27

## 2024-08-27 RX ORDER — IBUPROFEN 600 MG/1
600 TABLET, FILM COATED ORAL EVERY 6 HOURS PRN
Qty: 30 TABLET | Refills: 0 | Status: SHIPPED | OUTPATIENT
Start: 2024-08-27

## 2024-08-27 RX ADMIN — LIDOCAINE 1 PATCH: 700 PATCH TOPICAL at 08:48

## 2024-08-27 RX ADMIN — RIVAROXABAN 15 MG: 15 TABLET, FILM COATED ORAL at 08:48

## 2024-08-27 RX ADMIN — BUPRENORPHINE AND NALOXONE 8 MG: 8; 2 FILM BUCCAL; SUBLINGUAL at 08:48

## 2024-08-27 NOTE — PROGRESS NOTES
Progress Note    Elaine Omer 44 y.o. female MRN: [id number]  Unit/Bed#: 7T [facility] 713-01 Encounter: [id number]  Admitting Physician: Hailey Mosley MD  PCP: Destinee Wilson MD  Date of Admission:  8/23/2024  2:07 PM    Assessment and Plan    * Acute pulmonary embolism (HCC)  Assessment & Plan  History of multiple, recurrent DVTs/PEs starting back in 2013.   IVC placement in 2014 at [facility].  Non adherence to home medication warfarin in the setting of insurance difficulties   SOB and dizziness, left leg swelling present on admission   Neurological exam within normal limits, Vital signs stable   Patients home regimen: Warfarin 7.5 mg, nonadherence   In ED - Nonocclusive pulmonary embolism within a distal right upper lobar branch.  Elevated RV/LV ratio at 1.2, however unlikely to be a result of a small volume pulmonary emboli    Plan:   - Discontinued Lovenox.  - PT- INR 1.8 ( goal 2-3)  - Patient does have medical insurance ([employer]) Start Xarelto 15 mg BID  - VAS lower extremity, reviewed no DVT  - Incentive spirometry   - PT-INR tomorrow am  - Consider workup for underlying causes of DVT outpatient.     Left leg swelling  Assessment & Plan  Could be in the setting of DVT     - LLE VAS Duplex reviewed, no evidence of DVT   - See assessment and plan for PE.    Dizziness  Assessment & Plan  Most likely BPPB v/s Meniere Disease.    - PT consult  - Orthostatic test       Presence of IVC filter  Assessment & Plan  IVC placement in 2014 at [facility].   See assessment and plan above    Hx of substance abuse (HCC)  Assessment & Plan  Recent admission to rehab for substance use, currently on Suboxone 8-2 mg BID     Plan:   - Continue Suboxone 8-2 mg BID   - COWS score q daily    Pulmonary nodules  Assessment & Plan  CT Chest 08/23/2024: 2 to 3 mm pulmonary nodules.  4.2 cm fusiform ascending thoracic aortic ectasi    -Follow-up chest CT recommended in 12 months for the above findings in outpatient.     STD  exposure  Assessment & Plan  Concern for STD exposure from last partner   Vaginal discharge, and redness in the vaginal area per history  Hepatitis B Ig M negative. Syphilis Negative, HIV negative  Hepatitis C antibody Reactive, however is been low reactive since 2016.  Urinalysis normal    - Follow up in outpatient.  - Chlamydia/ Gonorrhea pending       Asthma  Assessment & Plan  Well controlled, patient not on any medication at this time.       -Will monitor for symptoms for now   -See assessment and plan above        VTE Pharmacologic Prophylaxis: VTE Score: 14 Moderate Risk (Score 3-4) - Pharmacological DVT Prophylaxis Ordered: rivaroxaban (Xarelto). Patient was transferred from Levenox and warfarin to Xarelto.       Patient Centered Rounds: I have performed bedside rounds with nursing staff today.    Discussions with Specialists or Other Care Team Provider: PT    Education and Discussions with Family / Patient: patient     Time Spent for Care: 30 minutes.  More than 50% of total time spent on counseling and coordination of care as described above.    Current Length of Stay: 3 day(s)    Current Patient Status: Inpatient   Certification Statement: The patient will continue to require additional inpatient hospital stay due to      Discharge Plan: Maybe tomorrow.    Code Status: Level 1 - Full Code      Subjective:     Patient was evaluated at the bedside during rounds. The patient reported feeling okay. There were no significant overnight events, but she still complains of some shortness of breath and dizziness when she moves her head. She denies experiencing palpitations, chest pain, or chest tightness. She is passing urine and stool normally. The patient is aware of the plan and agreed with it. All questions were addressed during the rounds.       Objective:     Vitals:   Temp (24hrs), Av.5 °F (36.4 °C), Min:97.3 °F (36.3 °C), Max:97.7 °F (36.5 °C)    Temp:  [97.3 °F (36.3 °C)-97.7 °F (36.5 °C)] 97.6 °F  (36.4 °C)  HR:  [75-85] 85  Resp:  [18-20] 20  BP: (104-126)/(72-92) 112/86  SpO2:  [97 %-98 %] 97 %  Body mass index is 44.81 kg/m².     Input and Output Summary (last 24 hours):       Intake/Output Summary (Last 24 hours) at 2024 2203  Last data filed at 2024 1707  Gross per 24 hour   Intake 120 ml   Output --   Net 120 ml       Physical Exam:     Physical Exam  Vitals reviewed.   Constitutional:       General: She is not in acute distress.     Appearance: She is obese. She is not ill-appearing or diaphoretic.   HENT:      Nose: Nose normal. No rhinorrhea.      Mouth/Throat:      Mouth: Mucous membranes are moist.      Pharynx: No oropharyngeal exudate or posterior oropharyngeal erythema.   Neck:      Vascular: No carotid bruit.   Cardiovascular:      Rate and Rhythm: Normal rate and regular rhythm.      Pulses: Normal pulses.      Heart sounds: Normal heart sounds. No murmur heard.     No friction rub. No gallop.   Pulmonary:      Effort: Pulmonary effort is normal. No respiratory distress.      Breath sounds: No stridor. No wheezing, rhonchi or rales.   Chest:      Chest wall: No tenderness.   Abdominal:      General: Abdomen is flat. Bowel sounds are normal. There is no distension.      Palpations: There is no hepatomegaly, splenomegaly or mass.      Tenderness: There is no abdominal tenderness. There is no right CVA tenderness, left CVA tenderness, guarding or rebound.      Hernia: No hernia is present.   Musculoskeletal:         General: Normal range of motion.      Cervical back: Normal range of motion. No rigidity.      Right lower le+ Edema present.      Left lower leg: Edema present.   Skin:     General: Skin is warm.      Capillary Refill: Capillary refill takes less than 2 seconds.      Coloration: Skin is not jaundiced.      Findings: No erythema or rash.   Neurological:      General: No focal deficit present.      Mental Status: She is alert and oriented to person, place, and time.  Mental status is at baseline.      Cranial Nerves: No cranial nerve deficit.      Sensory: No sensory deficit.      Motor: No weakness.      Coordination: Coordination normal.      Gait: Gait normal.      Deep Tendon Reflexes: Reflexes normal.   Psychiatric:         Mood and Affect: Mood normal.         Behavior: Behavior normal.         Thought Content: Thought content normal.         Judgment: Judgment normal.       Additional Data:     Labs:    Results from last 7 days   Lab Units 08/26/24  0509   WBC Thousand/uL 7.27   HEMOGLOBIN g/dL 12.6   HEMATOCRIT % 40.3   PLATELETS Thousands/uL 319   SEGS PCT % 44   LYMPHO PCT % 46*   MONO PCT % 7   EOS PCT % 2     Results from last 7 days   Lab Units 08/26/24  0509 08/24/24  0844 08/23/24  1517   POTASSIUM mmol/L 4.0   < > 4.0   CHLORIDE mmol/L 104   < > 104   CO2 mmol/L 24   < > 26   BUN mg/dL 14   < > 13   CREATININE mg/dL 0.66   < > 0.72   CALCIUM mg/dL 8.6   < > 9.4   ALK PHOS U/L  --   --  75   ALT U/L  --   --  13   AST U/L  --   --  14    < > = values in this interval not displayed.     Results from last 7 days   Lab Units 08/26/24  0507   INR  1.87*                 * I Have Reviewed All Lab Data Listed Above.  * Additional Pertinent Lab Tests Reviewed: All Labs For Current Hospital Admission Reviewed    Imaging:    Imaging Reports Reviewed Today Include: Doppler lower extremities.   Imaging Personally Reviewed by Myself Includes: Doppler lower extremities.    Recent Cultures (last 7 days):           Last 24 Hours Medication List:   Current Facility-Administered Medications   Medication Dose Route Frequency Provider Last Rate    buprenorphine-naloxone  8 mg Sublingual BID Brad Gamez MD      ibuprofen  400 mg Oral Q6H PRN Hailey Mosley MD      lidocaine  1 patch Topical Daily Hailey Mosley MD      melatonin  3 mg Oral HS Kayy Camacho MD      rivaroxaban  15 mg Oral BID With Meals Rebecca Fay Kab-Perlman, MD Lisbeth Lopez,  MD  08/26/24  10:03 PM

## 2024-08-27 NOTE — PLAN OF CARE
Problem: DISCHARGE PLANNING  Goal: Discharge to home or other facility with appropriate resources  Description: INTERVENTIONS:  - Identify barriers to discharge w/patient and caregiver  - Arrange for needed discharge resources and transportation as appropriate  - Identify discharge learning needs (meds, wound care, etc.)  - Arrange for interpretive services to assist at discharge as needed  - Refer to Case Management Department for coordinating discharge planning if the patient needs post-hospital services based on physician/advanced practitioner order or complex needs related to functional status, cognitive ability, or social support system  8/27/2024 1202 by Anthony Nichole, RN  Outcome: Adequate for Discharge  8/27/2024 0844 by Anthony Nichole, RN  Outcome: Progressing

## 2024-08-27 NOTE — ASSESSMENT & PLAN NOTE
- Hepatitis C reactive, which it's been low reactive since 2016.  -Asymptomatic  - Normal LFT  - HCV IgM core non reactive  - Continue PCP f/u

## 2024-08-27 NOTE — PHYSICAL THERAPY NOTE
Physical Therapy Evaluation    Patient's Name: Elaine Omer    Admitting Diagnosis  Shortness of breath [R06.02]  Dizziness [R42]  Single subsegmental pulmonary embolism without acute cor pulmonale (HCC) [I26.93]    Problem List  Patient Active Problem List   Diagnosis    DVT of lower extremity, bilateral (HCC)    Asthma    Mass on back    Hx of smoking    Psoriasis    Hx pulmonary embolism    Low mean corpuscular volume (MCV)    Presence of IVC filter    Acute pulmonary embolism (HCC)    Weight loss, unintentional    Hx of substance abuse (HCC)    Pulmonary nodules    Left leg swelling    STD exposure    Dizziness       Past Medical History  Past Medical History:   Diagnosis Date    Asthma     Hx of blood clots     Psoriasis     Spinal stenosis     URI (upper respiratory infection) 2016       Past Surgical History  Past Surgical History:   Procedure Laterality Date    APPENDECTOMY      BACK SURGERY       SECTION      x 3    HYSTERECTOMY  2015    Radical       Recent Imaging   VAS VENOUS DUPLEX - LOWER LIMB BILATERAL   Final Result by Anil Marquez MD ( [id number])      CTA ED chest PE Study   Final Result by Cheo Avila MD ( [id number])      Nonocclusive pulmonary embolism within a distal right upper lobar branch.   Elevated RV/LV ratio at 1.2, however unlikely to be a result of a small volume pulmonary emboli.      2 to 3 mm pulmonary nodules.   4.2 cm fusiform ascending thoracic aortic ectasia.   Follow-up chest CT recommended in 12 months for the above findings      Findings were discussed with Dr. Yousif at 5:25 p.m. on 2024            Workstation performed: [id number]             Recent Vital Signs  Vitals:    24 1500 24 1900 24 2347 24 0501   BP: 108/72 112/86 108/78 108/70   BP Location: Left arm Left arm Right arm Left arm   Pulse: 79 85 72 75   Resp: 20 20 20 18   Temp: 97.5 °F (36.4 °C) 97.6 °F (36.4 °C) 97.5 °F (36.4 °C) 97.5 °F (36.4 °C)    TempSrc: Temporal Temporal Temporal Temporal   SpO2: 97% 97% 99% 96%   Weight:       Height:            08/26/24 1130   PT Last Visit   PT Visit Date 08/26/24   Note Type   Note type Evaluation   Pain Assessment   Pain Assessment Tool 0-10   Pain Score 6   Pain Location/Orientation Location: Back   Restrictions/Precautions   Weight Bearing Precautions Per Order No   Home Living   Type of Home House   Home Layout Two level   Bathroom Toilet Standard   Bathroom Accessibility Accessible   Prior Function   Level of [place] Independent with ADLs;Independent with functional mobility   Falls in the last 6 months 0   General   Family/Caregiver Present No   Cognition   Overall Cognitive Status WFL   Arousal/Participation Alert   Orientation Level Oriented X4   Memory Within functional limits   Following Commands Follows all commands and directions without difficulty   RLE Assessment   RLE Assessment   (4/5)   LLE Assessment   LLE Assessment   (4/5)   Coordination   Movements are Fluid and Coordinated 0   Coordination and Movement Description some unsteadiness with gait intermittent, decreased gross motor coordination   Sensation WFL   Light Touch   RLE Light Touch Grossly intact   LLE Light Touch Grossly intact   Bed Mobility   Supine to Sit 7  Independent   Sit to Supine 7  Independent   Transfers   Sit to Stand 7  Independent   Stand to Sit 7  Independent   Ambulation/Elevation   Gait pattern Step through pattern;Decreased toe off;Decreased heel strike;Decreased foot clearance;Wide [place]   Gait Assistance 7  Independent   Assistive Device None   Distance 120ft   Balance   Static Sitting Fair +   Dynamic Sitting Fair +   Static Standing Fair   Dynamic Standing Fair -   Ambulatory Fair -   Endurance Deficit   Endurance Deficit Yes   Endurance Deficit Description lightheadedness   Activity Tolerance   Activity Tolerance Patient tolerated treatment well   Medical Staff Made Aware spoke to CM   Nurse Made Aware spoke to RN    Assessment   Prognosis Good   Problem List Decreased strength;Decreased endurance;Impaired balance;Decreased mobility;Decreased coordination;Obesity;Pain   Barriers to Discharge Inaccessible home environment;Decreased caregiver support   Goals   Patient Goals get better and go home   Discharge Recommendation   Rehab Resource Intensity Level, PT III (Minimum Resource Intensity)   AM-PAC Basic Mobility Inpatient   Turning in Flat Bed Without Bedrails 4   Lying on Back to Sitting on Edge of Flat Bed Without Bedrails 4   Moving Bed to Chair 4   Standing Up From Chair Using Arms 4   Walk in Room 4   Climb 3-5 Stairs With Railing 4   Basic Mobility Inpatient Raw Score 24   Basic Mobility Standardized Score 57.68   [facility] Highest Level Of Mobility   -HL Goal 8: Walk 250 feet or more   -HLM Achieved 8: Walk 250 feet ot more   End of Consult   Patient Position at End of Consult Bedside chair;All needs within reach           ASSESSMENT                                                                                                                     Elaine Omer is a 44 y.o. female admitted to [facility] on 8/23/2024 for Acute pulmonary embolism (HCC). Pt  has a past medical history of Asthma, blood clots, Psoriasis, Spinal stenosis, and URI (upper respiratory infection) (04/16/2016).. PT was consulted and pt was seen on 8/27/2024 for mobility assessment and d/c planning.  Impairments limiting pt at this time include impaired balance, decreased endurance, decreased coordination, new onset of impairment of functional mobility, pain, and decreased strength. Pt is currently functioning at a independent level for bed mobility, independent level for transfers, independent level for ambulation with no assistive device. The patient's AM-[facility] Basic Mobility Inpatient Short Form Raw Score is 24. A Raw score of greater than 16 suggests the patient may benefit from discharge to home. Please also refer to the recommendation  of the Physical Therapist for safe discharge planning.    Recommendations                                                                                                              Pt will benefit from continued skilled IP PT to address the above mentioned impairments in order to maximize recovery and increase functional independence when completing mobility and ADLs. See flow sheet for goals and POC.     DME: None    Discharge Disposition:  Home with Outpatient Physical Therapy (vestibular PT eval, pt provided with resources)      Abiodun Hurt PT, DPT

## 2024-08-27 NOTE — DISCHARGE SUMMARY
Discharge Summary - [facility]    Patient Information: Elaine Omer 44 y.o. female MRN: [id number]  Unit/Bed#: 7T [facility] 713-01 Encounter: [id number]    Discharging Physician / Practitioner: ***  PCP: Destinee Wilson MD  Admission Date:   Admission Orders (From admission, onward)       Ordered        08/23/24 2229  INPATIENT ADMISSION  Once            08/23/24 1748  Place in Observation  Once                          Discharge Date: ***    Reason for Admission: Acute pulmonary embolism w/o anticoagulation     Discharge Diagnoses:     Principal Problem:    Acute pulmonary embolism (HCC)  Active Problems:    Asthma    Presence of IVC filter    Hx of substance abuse (HCC)    Pulmonary nodules    Left leg swelling    STD exposure    Dizziness  Resolved Problems:    * No resolved hospital problems. *        Dizziness  Assessment & Plan  Most likely BPPB v/s Meniere Disease.    - PT consult  - Orthostatic test       STD exposure  Assessment & Plan  Concern for STD exposure from last partner   Vaginal discharge, and redness in the vaginal area per history  Hepatitis B Ig M negative. Syphilis Negative, HIV negative  Hepatitis C antibody Reactive, however is been low reactive since 2016.  Urinalysis normal    - Follow up in outpatient.  - Chlamydia/ Gonorrhea pending       Left leg swelling  Assessment & Plan  Could be in the setting of DVT     - LLE VAS Duplex reviewed, no evidence of DVT   - See assessment and plan for PE.    Pulmonary nodules  Assessment & Plan  CT Chest 08/23/2024: 2 to 3 mm pulmonary nodules.  4.2 cm fusiform ascending thoracic aortic ectasi    -Follow-up chest CT recommended in 12 months for the above findings in outpatient.     Hx of substance abuse (HCC)  Assessment & Plan  Recent admission to rehab for substance use, currently on Suboxone 8-2 mg BID     Plan:   - Continue Suboxone 8-2 mg BID   - COWS score q daily    Presence of IVC filter  Assessment & Plan  IVC  placement in 2014 at [facility].   See assessment and plan above    Asthma  Assessment & Plan  Well controlled, patient not on any medication at this time.       -Will monitor for symptoms for now   -See assessment and plan above    * Acute pulmonary embolism (HCC)  Assessment & Plan  History of multiple, recurrent DVTs/PEs starting back in 2013.   IVC placement in 2014 at [facility].  Non adherence to home medication warfarin in the setting of insurance difficulties   SOB and dizziness, left leg swelling present on admission   Neurological exam within normal limits, Vital signs stable   Patients home regimen: Warfarin 7.5 mg, nonadherence   In ED - Nonocclusive pulmonary embolism within a distal right upper lobar branch.  Elevated RV/LV ratio at 1.2, however unlikely to be a result of a small volume pulmonary emboli    Plan:   - Discontinued Lovenox.  - PT- INR 1.8 ( goal 2-3)  - Patient does have medical insurance ([employer]) Start Xarelto 15 mg BID  - VAS lower extremity, reviewed no DVT  - Incentive spirometry   - PT-INR tomorrow am  - Consider workup for underlying causes of DVT outpatient.          Consultations During Hospital Stay:  Physical therapy    Procedures Performed:   None    Significant Findings / Test Results:   CTA PE:   Nonocclusive pulmonary embolism within a distal right upper lobar branch.  Elevated RV/LV ratio at 1.2, however unlikely to be a result of a small volume pulmonary emboli.  HEP C Antibody: Reactive     Incidental Findings:   CTA PE:   2 to 3 mm pulmonary nodules.  4.2 cm fusiform ascending thoracic aortic ectasia.  Follow-up chest CT recommended in 12 months for the above findings      Test Results Pending at Discharge (will require follow up):   HEP C PCR      Outpatient Tests Requested:  Follow-up chest CT recommended in 12 months for the above findings    Outpatient follow-up Requested:  ***  F/U w/PCP     Complications:   None     Hospital Course:     Elaine Omer is a 44 y.o.  "female patient who originally presented to the hospital on 8/23/2024 due to 2 day history of progressively worsening shortness of breath and dizziness .  Vital signs upon presentation was stable and cardiac workup was unremarkable with no signs of ischemic changes.  CTA chest PE study was notable for Nonocclusive pulmonary embolism within a distal right upper lobar branch and was admitted under family medicine for acute pulmonary embolism management.    During hospital course patient was anticoagulated with Lovenox and warfarin bridge.  Patient was then transition to Xarelto 15 mg twice daily for PE management.  Duplex scan for left leg swelling was negative for DVT.  Patient's home regimen for substance use disorder was continued during inpatient course.  PT was consulted for evaluation of dizziness      The day before discharge the patient was given one dose of Xarelto 15 mg with the plan of administering a second dose on the morning of discharge. **********************On the day of discharge the patient was     Review of Systems    Condition at Discharge: stable     Discharge Day Visit / Exam:     Vitals: Blood Pressure: 112/86 (08/26/24 1900)  Pulse: 85 (08/26/24 1900)  Temperature: 97.6 °F (36.4 °C) (08/26/24 1900)  Temp Source: Temporal (08/26/24 1900)  Respirations: 20 (08/26/24 1900)  Height: 5' 6\" (167.6 cm) (08/23/24 2007)  Weight - Scale: 126 kg (277 lb 9.6 oz) (08/23/24 2007)  SpO2: 97 % (08/26/24 1900)  Exam:   Physical Exam  ( *** Be Sure to Include Physical Exam: Delete this entire line when you have entered your exam)    Discussion with Family: ***  Patient Information Sharing: With the consent of Elaine Omer , their loved ones (insert name(s) here***) were notified today by inpatient team of the patient’s condition and current plan.  All questions answered. *** Elaine Omer does not wish inpatient team to notify their loved ones of their condition and current plan. ***    Discharge " instructions/Information to patient and family:   See after visit summary for information provided to patient and family.      Discharge Medications:  ***Copy discharge medications here     Provisions for Follow-Up Care:  See after visit summary for information related to follow-up care and any pertinent home health orders.      Disposition:     {Disposition at Discharge:71905}    For Discharges to [facility]:   {[facility] Contact List:[phone]} - {[facility] Phone Call:[phone]}    Planned Readmission: ***     Discharge Statement:  I spent *** minutes discharging the patient. This time was spent on the day of discharge. I had direct contact with the patient on the day of discharge. Greater than 50% of the total time was spent examining patient, answering all patient questions, arranging and discussing plan of care with patient as well as directly providing post-discharge instructions.  Additional time then spent on discharge activities.    ** Please Note: This note has been constructed using a voice recognition system **    Sujit Escobar MD  08/26/24  11:43 PM

## 2024-08-27 NOTE — NURSING NOTE
Discharge instructions given both written and verbally with details regarding f/u apt and medications. Denies questions. Patient has personal items in room. Asked to use her call light when ready to be taken to the lobby.

## 2024-08-27 NOTE — QUICK NOTE
Patient was seen and assessed at bedside.   Vital signs within normal limits.   Patient was stable, pleasant, speaking comfortably without any new or acute complaints, states overall improving.  Answered all questions.  Updated on plan, amenable to receiving her AM xarelto before being discharged.     No signs of respiratory distress, normal breath sounds, no abnormal heart sounds, no swelling of the legs. Patient is currently saturating adequately on room air. Denies any SOB ,chest pain, or trouble breathing.   Endorses dizziness. Normal neurological exam, will speak with the daytime regarding further management.   Will continue to monitor overnight and revaluate in the morning.

## 2024-08-27 NOTE — ASSESSMENT & PLAN NOTE
PT consulted, which recommended Home with Outpatient Physical Therapy.  Orthostatic test normal     - Meclizine 25 mg q8 PRN  - Consider workup for underlining causes in outpatient..

## 2024-08-28 LAB — HCV RNA SERPL NAA+PROBE-ACNC: NOT DETECTED K[IU]/ML

## 2024-08-28 NOTE — UTILIZATION REVIEW
NOTIFICATION OF ADMISSION DISCHARGE   This is a Notification of Discharge from [facility]. Please be advised that this patient has been discharge from our facility. Below you will find the admission and discharge date and time including the patient’s disposition.   UTILIZATION REVIEW CONTACT:  Alexandra Valles MA  Utilization   Network Utilization Review Department  Phone: [phone] x carefully listen to the prompts. All voicemails are confidential.  Email: [email or web address]     ADMISSION INFORMATION  PRESENTATION DATE: 8/23/2024  2:07 PM  OBERVATION ADMISSION DATE: 08/23/2024 1748  INPATIENT ADMISSION DATE: 8/23/24 10:29 PM   DISCHARGE DATE: 8/27/2024 12:45 PM   DISPOSITION:Home/Self Care    Network Utilization Review Department  ATTENTION: Please call with any questions or concerns to [phone] and carefully listen to the prompts so that you are directed to the right person. All voicemails are confidential.   For Discharge needs, contact Care Management DC Support Team at [phone] opt. 2  Send all requests for admission clinical reviews, approved or denied determinations and any other requests to dedicated fax number below belonging to the campus where the patient is receiving treatment. List of dedicated fax numbers for the Facilities:  FACILITY NAME UR FAX NUMBER   ADMISSION DENIALS (Administrative/Medical Necessity) [phone]   DISCHARGE SUPPORT TEAM ([facility]) [phone]   PARENT CHILD HEALTH (Maternity/NICU/Pediatrics) [phone]   [facility] [phone]   [facility] [phone]   [facility] [phone]   [facility] [phone]   [facility] [phone]   [facility] [phone]   [facility] [phone]   [facility]  [place] [phone]   [facility] [phone]   [facility] [phone]   [facility] [phone]   [facility] [phone]

## 2024-09-16 ENCOUNTER — TELEPHONE (OUTPATIENT)
Dept: FAMILY MEDICINE CLINIC | Facility: CLINIC | Age: 44
End: 2024-09-16

## 2024-09-16 NOTE — TELEPHONE ENCOUNTER
"Patient contacted office on 9/16/24 in regards to an appointment that was scheduled originally on 9/23/24 with  as an OVL for a TCM follow up. Patient was informed appointment was cancelled due to a change in the provider schedule and that they tried contacting her but the number originally on file was not in service. Patient informed me the number was not correct and I changed the number to patient current active phone number. Patient was also informed about rescheduling appointment for another date. I reviewed patients chart and informed patient she has never been seen at our office and would be considered a new patient. (Original appointment 9/23/24 was scheduled incorrectly). Patient stated \"I cannot wait long for an appointment,\" due to her not having sufficient to last until the appointment. I found patient an appointment for this week on 9/19/24 with . Patient took appointment  but wanted her medication do to having difficulties without it. Patient wanted to speak with a doctor or have a doctor contact her back. Patient assumed  was her PCP because she saw her in the hospital during her admission, patient was informed that is not her PCP and she is a new patient at [facility] so receiving medication before the appointment would not be possible. Patient wanted a note/message to be sent to the doctor that was going to see her. Patient was advised a message could be sent with no promises that the doctor would send medications before seeing patient and review medical history and chart. As I was creating the New Patient appointment patient was informed her insurance on chart was Non-Par with our office and she would be considered self-pay. Patient was also informed of our Financial Counseling office and contacting her insurance to find other primary care offices in her area. Patient was extremely upset at the fact that the  who initially made her appointment did not explain the " non-par insurance and her being a new patient and medication situation. Patient was frustrated and stated she is going to come into office the next day so everyone can hear her mouth due to her trying to contact office for two weeks and not receiving any responses. Patient was advised if she does come in, there is no need to make a seen she can simply request to speak to management in regards to her current dilemma. Patient overall was advised on non-par insurance, New Patient status at [facility], and no possibility of receiving medication before appointment.    Appointment schedule for 9/19/24 was canceled due to patient having non-par insurance and patient stating she was coming into office next day.

## 2024-09-16 NOTE — TELEPHONE ENCOUNTER
first attempt to contact patient. no answer, number is not in service. Mailed letter stating she has to reschedule appointment.

## 2024-09-18 ENCOUNTER — TELEPHONE (OUTPATIENT)
Dept: OTHER | Age: 44
End: 2024-09-18

## 2024-09-18 ENCOUNTER — HOSPITAL ENCOUNTER (EMERGENCY)
Facility: HOSPITAL | Age: 44
Discharge: HOME | End: 2024-09-18
Attending: EMERGENCY MEDICINE
Payer: COMMERCIAL

## 2024-09-18 VITALS
HEART RATE: 91 BPM | RESPIRATION RATE: 16 BRPM | BODY MASS INDEX: 46.15 KG/M2 | WEIGHT: 285.94 LBS | SYSTOLIC BLOOD PRESSURE: 119 MMHG | TEMPERATURE: 98.1 F | DIASTOLIC BLOOD PRESSURE: 88 MMHG | OXYGEN SATURATION: 98 %

## 2024-09-18 DIAGNOSIS — Z76.0 ENCOUNTER FOR MEDICATION REFILL: Primary | ICD-10-CM

## 2024-09-18 PROCEDURE — 99284 EMERGENCY DEPT VISIT MOD MDM: CPT

## 2024-09-18 PROCEDURE — 99282 EMERGENCY DEPT VISIT SF MDM: CPT

## 2024-09-18 RX ORDER — BUPRENORPHINE AND NALOXONE 8; 2 MG/1; MG/1
8 FILM, SOLUBLE BUCCAL; SUBLINGUAL 2 TIMES DAILY
Qty: 20 FILM | Refills: 0 | Status: SHIPPED | OUTPATIENT
Start: 2024-09-18 | End: 2024-09-26

## 2024-09-18 RX ORDER — BUPRENORPHINE AND NALOXONE 8; 2 MG/1; MG/1
8 FILM, SOLUBLE BUCCAL; SUBLINGUAL ONCE
Status: COMPLETED | OUTPATIENT
Start: 2024-09-18 | End: 2024-09-18

## 2024-09-18 RX ADMIN — BUPRENORPHINE AND NALOXONE 8 MG: 8; 2 FILM BUCCAL; SUBLINGUAL at 12:41

## 2024-09-18 NOTE — ED PROVIDER NOTES
"1. Encounter for medication refill      ED Disposition       ED Disposition   Discharge    Condition   Stable    Date/Time   Wed Sep 18, 2024 12:30 PM    Comment   Elaine Omer discharge to home/self care.                   Assessment & Plan       Medical Decision Making  44 year old female presenting to the ED for medication refill. No acute symptoms. Reviewed the PDMP to confirm the patient is due for a refill. Prescribed the medications and gave information for obtaining a PCP. Pt stable at time of discharge, vital signs reviewed, questions answered. Strict ER return precautions provided/discussed and were well understood by patient. Patient's vitals, labs and/or imaging results, diagnosis, and treatment plan were discussed with the patient. All new and/or changed medications were discussed - specifically to include route of administration, how often to take, when to take, and the pharmacy they were sent to. Strict return precautions as well as close follow up with PCP was discussed with the patient and the patient was agreeable to my recommendations.  Patient verbally acknowledged understanding. All labs, imaging were reviewed and used in the medical decision making process (if ordered).     Portions of this chart may have been written with voice recognition software.  Occasional grammatical errors, wrong word or \"sound a like\" substitutions may have occurred due to software limitations.  Please read carefully and use context to recognize where substitutions have occurred.      Problems Addressed:  Encounter for medication refill: acute illness or injury    Amount and/or Complexity of Data Reviewed  External Data Reviewed: notes.    Risk  Prescription drug management.                   Medications   buprenorphine-naloxone (Suboxone) film 8 mg (8 mg Sublingual Given 9/18/24 1241)       History of Present Illness       Kayce is a 44 year old female presenting to the ED for medication refill. No acute " symptoms. Last took suboxone yesterday morning, has been taking Xarelto regularly. Is having difficulty finding a PCP who accepts her insurance, as the [facility] told her they do not. Has an appointment for her suboxone refills tomorrow, but only triage appointment and won't see a physician until September 23, which is in five days and does not have any doses left at this time. Reports she was initially told she could have the prescription filled tomorrow but found out today she cannot.         Review of Systems   Constitutional:  Negative for chills, fatigue and fever.   Eyes:  Negative for photophobia and visual disturbance.   Respiratory:  Negative for cough and shortness of breath.    Cardiovascular:  Negative for chest pain and palpitations.   Musculoskeletal:  Negative for neck pain and neck stiffness.   Neurological:  Negative for light-headedness and headaches.     Objective     ED Triage Vitals [09/18/24 1155]   Temperature Pulse Blood Pressure Respirations SpO2 Patient Position - Orthostatic VS   98.1 °F (36.7 °C) 91 119/88 16 98 % Sitting      Temp Source Heart Rate Source BP Location FiO2 (%) Pain Score    Oral Monitor Right arm -- --        Physical Exam  Vitals reviewed.   Constitutional:       General: She is not in acute distress.     Appearance: Normal appearance. She is not ill-appearing or toxic-appearing.   HENT:      Head: Normocephalic and atraumatic.      Right Ear: External ear normal.      Left Ear: External ear normal.      Nose: Nose normal.   Eyes:      General: No scleral icterus.        Right eye: No discharge.         Left eye: No discharge.      Extraocular Movements: Extraocular movements intact.      Conjunctiva/sclera: Conjunctivae normal.   Cardiovascular:      Rate and Rhythm: Normal rate and regular rhythm.      Pulses: Normal pulses.   Pulmonary:      Effort: Pulmonary effort is normal. No respiratory distress.   Musculoskeletal:         General: Normal range of motion.       Cervical back: Normal range of motion and neck supple. No rigidity or tenderness.   Lymphadenopathy:      Cervical: No cervical adenopathy.   Skin:     General: Skin is warm and dry.      Capillary Refill: Capillary refill takes less than 2 seconds.   Neurological:      General: No focal deficit present.      Mental Status: She is alert.   Psychiatric:         Mood and Affect: Mood normal.         Behavior: Behavior normal.         Labs Reviewed - No data to display  No orders to display       Procedures       Andria Chavez PA-C  09/18/24 5836

## 2024-09-18 NOTE — TELEPHONE ENCOUNTER
"Kayce visited the MAT office stating that she needs her Suboxone. Kayce states that she was recently discharged from a rehab, was prescribed 14 days worth of Suboxone to last until 9/10/24,  and was to have an appt \"across the street,\" but was told today, the date of her appt, that they do not accept her insurance. She has been without her Suboxone, but would not elaborate.     Kayce last used meth on approximately 6/10/24. She has no language barrier nor hearing impairment nor any psychiatric diagnoses. She feels safe, has no current SI/HI, nor any community treatment team. Insurance and address reviewed.    Suggested to Kayce that she visit the ED to attempt to obtain Suboxone until she can be seen in our office, and if she was having withdraw symptoms to request detox.Kayce was very appreciative.    For your review.  "

## 2024-09-19 ENCOUNTER — TELEPHONE (OUTPATIENT)
Dept: OTHER | Age: 44
End: 2024-09-19

## 2024-09-19 NOTE — TELEPHONE ENCOUNTER
Kayce left a VM on the MAT line this morning stating that she is sick and cannot make the appt today with Angelica LUTHER CM, but she wants to reschedule. Called and spoke to Kayce who is now scheduled for tomorrow at 9 am.    For your review.

## 2024-09-20 ENCOUNTER — OFFICE VISIT (OUTPATIENT)
Dept: PSYCHIATRY | Facility: CLINIC | Age: 44
End: 2024-09-20
Payer: COMMERCIAL

## 2024-09-20 DIAGNOSIS — F11.91 OPIOID USE DISORDER IN REMISSION: Primary | ICD-10-CM

## 2024-09-20 PROCEDURE — H0006 ALCOHOL AND/OR DRUG SERVICES: HCPCS

## 2024-09-20 NOTE — TELEPHONE ENCOUNTER
Pt was advised as she requested to speak with Management. I discuss with pt and walked her to  for further assistance.

## 2024-09-25 NOTE — PSYCH
BIOPSYCHOSOCIAL ASSESSMENT    Client Name:Kayce Omer  :1980    Was Insurance Verified: Yes, describe: [facility] - Patient and this CM discussed at length the need to transfer insurance to [facility] as patient has relocated to [facility] from [facility] due to patient's son being on life support following a GSW. Patient does not want the county transfer to interfere with benefits such a SNAP but understands in order to obtain additional needs such as PCP, case management patient must transfer. Will let this CM know by monday    Date of Service: 2024  Time of Service   Start Time: 900   End Time: 935  TOTAL BILLABLE TIME: 35 MINUTES     Current Hospital Admission (if applicable):   - Date of admission:   - Anticipated Discharge Date:     Presenting Problem (reason for referral): referral for Suboxone maintenance    - Individuals' perception of the problem: Patient has relocated to [facility] with the continued need for Suboxone maintenance     Individual/Family's Expectations: MAT maintenance     Family, Social and Sexual Information:     - Sexual orientation: Heterosexual/straight   - Gender Identity: Female    Gender assigned at birth: Female    Any gender identity issues? no    - Marital Status: single  - Dependant Children? Yes - 13 YO son    - Describe primary support system: family, friends     Current Living Situation:    - Issues with current living situation? No   - Able to return? yes    Additional Comments: currently staying with family friend     Academic/Vocational:   - Currently in school? No    Current School name (if applicable):     Degree to which substance use interfered with education:       Highest education level: high school      - Current occupation/vocation:Unemployed     Status: unemployed    Hours worked per week:     Degree to which substance use has interfered with employment:       Other/all forms of income: None      -  service: no    (If YES,  answer the following:)  Branch:     Status:   Eligible for VA Benefits?     Combat Trauma?     Injuries related to  service?     Cultural and Ethnic Background:   - Describe any cultural and/or ethnical beliefs that may impact care: Did not elaborate    - Primary Jainism: Other: unknown     Legal History:   -Current charges pending (or within the past 5 years)? No    (If yes, please explain):    - Probation/[place]? no    (If YES, answer the following:)  Probation/:    Probation/ contact information:     Court Mandated treatment? no    Substance Use History: Previously on methadone - went to rehab in order to transition to Suboxone. Patient [place]     Substance Type  Age of Onset Method of Use Average use and Frequency When Last Used Last Amount Used   Alcohol no        Amphetamines no        Methamphetamines no        Benzodiazepines no        Cannabis no        Cocaine/Crack Cocaine no        Hallucinogens no        Heroin/Opiates yes        Fentanyl yes        Xylazine no        Ecstasy no        Nicotine no        Other no           - History of overdose: Yes, describe: did not state last overdose    - Withdrawal symptoms:No     - AA/NA: No    Sponsor (if applicable):      - History of previous time in recovery/abstinence: Yes, describe: current time period     If yes, how was this achieved?     What caused recurrence of use?      - History of Detoxification or Rehabilitation? Yes, describe: Patient recently in rehab in june       - History of Substance use in Family? No      - History of Addictive Behavior? No    (I.e., Gambling, Sexual, Internet, Other.)    Physical/Mental Health:    Current Physical Diagnosis: Please see current problem list     Mental Health Diagnosis: None      Additional comments regarding mental health history?     Current Allergies: bee venom, fish  Allergies to medication: vicodin, hydrocodone, ceftriaxone    Current Medications: please  see current medication    (If current admission to hospital, please identify anticipated medication list upon hospital discharge):  Symptom Check List:    Depression: No known history of depression    Maricruz: no     Anxiety/Panic/Phobia: none    Eating Disorder: no    Post-Traumatic Stress Disorder (PTSD):  Denies     Obsessive/Compulsive: no    Thought Process: Denies    Impulsive Behavior: None    Risk Assessment:   - History of Violence? No   - Danger to self? no    Preoccupation with death? no  Suicidal Ideation? no  History of suicide attempt? No  Current threat? yes  Current Plan? no  Method? No  - Danger to others? no   Homicidal Ideation? no   History of threats? No   Attempts? No    IMMEDIATE ACCESS TO FIREARMS? no   If yes, Please address accordingly.    Protective Factors   - Family involvement? yes   - Sense of hope? yes   - Strong Social Support? yes    SNAP   Strengths - What are some things that will help you in treatment? Support from family (parents, children, others)   Needs - What do you want to learn in treatment? Contact with supportive friends and Getting and keeping a job  Abilities - What are some of your personal qualities, skills or talents that will help you in treatment? I have a good self-esteem and I have some positive plans and goals for my future  Preferences - None Identified    Clinical Summary/Recommendation:     Patient needs to establish permanent residence in order to gain additional support for overall health     Patient scheduled with medical toxicology

## 2024-09-26 ENCOUNTER — OFFICE VISIT (OUTPATIENT)
Dept: OTHER | Age: 44
End: 2024-09-26
Payer: COMMERCIAL

## 2024-09-26 VITALS
WEIGHT: 282 LBS | HEART RATE: 74 BPM | SYSTOLIC BLOOD PRESSURE: 130 MMHG | BODY MASS INDEX: 45.32 KG/M2 | HEIGHT: 66 IN | DIASTOLIC BLOOD PRESSURE: 84 MMHG

## 2024-09-26 DIAGNOSIS — F11.21 OPIOID USE DISORDER, SEVERE, IN SUSTAINED REMISSION, DEPENDENCE (HCC): Primary | ICD-10-CM

## 2024-09-26 PROCEDURE — 99204 OFFICE O/P NEW MOD 45 MIN: CPT | Performed by: EMERGENCY MEDICINE

## 2024-09-26 RX ORDER — BUPRENORPHINE AND NALOXONE 8; 2 MG/1; MG/1
8 FILM, SOLUBLE BUCCAL; SUBLINGUAL 2 TIMES DAILY
Qty: 60 FILM | Refills: 0 | Status: SHIPPED | OUTPATIENT
Start: 2024-09-26

## 2024-09-26 NOTE — PROGRESS NOTES
[facility]     History and Physical  Elaine Omer 44 y.o. female MRN: [id number]   @ Encounter: [id number]       1. Opioid use disorder, severe, in sustained remission, dependence (HCC)  Assessment & Plan:  Pt was transitioned from methadone to buprenorphine at [facility] in June. She wishes to continue the medication.   We will continue her current dose.   She also has chronic back pain. She will discuss with PCP regarding possible referral to [facility] for non-opioid chronic pain management options.   Orders:  -     buprenorphine-naloxone (Suboxone) 8-2 mg; Place 1 Film (8 mg total) under the tongue 2 (two) times a day  -     naloxone (NARCAN) 4 mg/0.1 mL nasal spray; Administer 1 spray into a nostril. If no response after 2-3 minutes, give another dose in the other nostril using a new spray.      In addition to the above recommendations, the patient will also be referred to the [facility]'s Certified Addiction Counselors for additional evaluation and psychotherapy. We will also engage our Certified  for patient support.       HPI: Elaine Omer is a 44 y.o. year old female who presents with OUD. She was introduced to heroin in 1998, at which point she was using it via insufflation and injection. Ultimately, she ended up being arrested an charges related to drug use and selling in 2003. She was incarcerated for 6 months. After release from penitentiary, she spent about 2-3 months in [facility]. She gave birth shortly after discharge from [facility] and remained in recovery without use until 2013. In 2013, she had back surgery and received Rx oxycodone. Those prescriptions were continued for 4-5 months at which point she realized she had developed dependence and overuse. The Rx was cut off. In order to avoid using heroin again, she enrolled in a methadone maintenance program, which she did well with since 2013. In June if 2024, she decided to get off the methadone  because she was having difficulty getting to the clinic and ultimately wanted to be off MOUD. She checked in to [facility] to get off the methadone. There, she was transitioned to buprenorphine, which she has been taking since. She presents here for maintenance as she feels she is doing well and wants to continue it.         Review of PDMP:     PDMP Review         Value Time User    PDMP Reviewed  Yes 2024  8:19 AM Olegario Fregoso DO               Social History     Tobacco Use   Smoking Status Former    Current packs/day: 0.25    Types: Cigarettes   Smokeless Tobacco Never        Review of Systems   Constitutional:  Negative for chills and fever.   HENT:  Negative for ear pain and sore throat.    Eyes:  Negative for pain and visual disturbance.   Respiratory:  Negative for cough and shortness of breath.    Cardiovascular:  Negative for chest pain and palpitations.   Gastrointestinal:  Negative for abdominal pain and vomiting.   Genitourinary:  Negative for dysuria and hematuria.   Musculoskeletal:  Negative for arthralgias and back pain.   Skin:  Negative for color change and rash.   Neurological:  Negative for seizures and syncope.   All other systems reviewed and are negative.   ROS     Historical Information      Past Medical History:   Diagnosis Date    Asthma     Hx of blood clots     Psoriasis     Spinal stenosis     URI (upper respiratory infection) 2016        Past Surgical History:   Procedure Laterality Date    APPENDECTOMY      BACK SURGERY       SECTION      x 3    HYSTERECTOMY  2015    Radical        No family history on file.     Social History      Marital Status: Single    Patient Pre-hospital Living Situation: Housed    Communicable diseases:    Tuberculosis (TB):   Have you traveled extensively (more than 4 weeks) outside the [place] in the last five years to high tuberculosis incidence areas ([place], [place], [place], [place])?: No  Have you resided in any of these  facilities in the past year: jails, prisons, shelters, nursing homes, and other long-term care facilities such as rehabilitation centers? If residents of any of these facilities were tested within the past three (3) bc: No  Have you had any close contact with someone diagnosed with tuberculosis?: No  Have you been homeless within the past year?: Yes  If yes, describe:: June 2024 couple weeks  Have you ever injected drugs?: Yes  If yes, describe:: >20 years ago  Do you or anyone in your household, currently have the following symptoms such as a sustained cough for two or more weeks, coughing up blood, fever/chills, loss of appetite, unexplained weight loss, fatigue, night sweats? : No  Do you currently have or anticipate having any condition that would decrease your immune system?: No  Negative PPD in June 2024    Viral Hepatitis:  Have you been tested for Hepatitis B or C?: Yes  Diagnosed with Hepatitis B or C?: Yes  If yes, when?: +Hep C Ab. Negative viral load August 2024    HIV:  Have you been diagnosed with HIV?: No  Do you have high risk factors for HIV including injecting drugs, multiple sexual partners, engaging in unprotected sexual activity, infected or recently treated for viral hepatitis, or infected or recently treated for a sexually transmitted disease(STD)?: Yes  If yes, describe:: Tested neg within past few weeks    Allergies   Allergen Reactions    Bee Venom Anaphylaxis and Other (See Comments)     throat swells    Vicodin [Hydrocodone-Acetaminophen] Anaphylaxis, Hives and Throat Swelling    Ceftriaxone Hives    Fish Allergy - Food Allergy Hives     Flounder and tilpia    Fish-Derived Products - Food Allergy Other (See Comments)     hives    Hydrocodone         Prior to Admission medications    Medication Sig Start Date End Date Taking? Authorizing Provider   buprenorphine-naloxone (Suboxone) 8-2 mg Place 1 Film (8 mg total) under the tongue 2 (two) times a day 9/26/24  Yes Olegario Fregoso, DO    ibuprofen (MOTRIN) 600 mg tablet Take 1 tablet (600 mg total) by mouth every 6 (six) hours as needed for mild pain, fever, headaches or moderate pain 8/27/24  Yes Amirah Chong MD   lidocaine (LIDODERM) 5 % Apply 1 patch topically over 12 hours daily Remove & Discard patch within 12 hours or as directed by MD 8/28/24  Yes Amirah Chong MD   meclizine (ANTIVERT) 25 mg tablet Take 1 tablet (25 mg total) by mouth every 8 (eight) hours as needed for dizziness or nausea 8/27/24  Yes Amirah Chong MD   naloxone (NARCAN) 4 mg/0.1 mL nasal spray Administer 1 spray into a nostril. If no response after 2-3 minutes, give another dose in the other nostril using a new spray. 9/26/24 9/26/25 Yes Olegario Fregoso DO   rivaroxaban (Xarelto) 20 mg tablet Take 1 tablet (20 mg total) by mouth daily with breakfast 9/18/24 10/18/24 Yes Andria Chavez PA-C   buprenorphine-naloxone (Suboxone) 8-2 mg Place 1 Film (8 mg total) under the tongue 2 (two) times a day for 10 days 9/18/24 9/26/24 Yes Andria Chavez PA-C   rivaroxaban (Xarelto) 15 mg tablet Take 1 tablet (15 mg total) by mouth 2 (two) times a day for 21 days 8/26/24 9/16/24  Rebecca Fay Kab-Perlman, MD       buprenorphine-naloxone     Objective      Vitals    Vitals:    09/26/24 0840   BP: 130/84   Pulse: 74       Physical Exam  Constitutional:       General: She is not in acute distress.     Appearance: Normal appearance.   HENT:      Mouth/Throat:      Mouth: Mucous membranes are moist.   Eyes:      Extraocular Movements: Extraocular movements intact.   Cardiovascular:      Rate and Rhythm: Normal rate.   Pulmonary:      Effort: Pulmonary effort is normal.   Abdominal:      General: There is no distension.   Musculoskeletal:         General: Normal range of motion.   Skin:     Coloration: Skin is not jaundiced.   Neurological:      General: No focal deficit present.      Mental Status: She is alert and oriented to person, place, and time.       Gait: Gait normal.   Psychiatric:         Mood and Affect: Mood normal.         Behavior: Behavior normal.             COWS Score:          Data:     Lab Results:  Results from last 6 Months   Lab Units 08/26/24  0509   WBC Thousand/uL 7.27   HEMOGLOBIN g/dL 12.6   HEMATOCRIT % 40.3   PLATELETS Thousands/uL 319        Results from last 6 Months   Lab Units 08/26/24  0509 08/24/24  0844 08/23/24  1517   POTASSIUM mmol/L 4.0   < > 4.0   CHLORIDE mmol/L 104   < > 104   CO2 mmol/L 24   < > 26   BUN mg/dL 14   < > 13   CREATININE mg/dL 0.66   < > 0.72   CALCIUM mg/dL 8.6   < > 9.4   ALBUMIN g/dL  --   --  3.9   ALK PHOS U/L  --   --  75   ALT U/L  --   --  13   AST U/L  --   --  14    < > = values in this interval not displayed.        Hepatitis panel:  Results from last 6 Months   Lab Units 08/24/24  0844   HEP C AB  Reactive*   HEP B C IGM  Non-reactive       HIV results:   Results from last 6 Months   Lab Units 08/24/24  0844   HIV-1 P24 ANTIGEN-BIOPLEX  Non-Reactive       TB:    Neg PPD June 2024         EKG, Pathology, and Other Studies: Reviewed    Counseling / Coordination of Care     Total time spent today 45 minutes. Greater than 50% of total time was spent with the patient and / or family counseling and / or coordination of care.         ** Please Note: This note may have been constructed using a voice recognition system. **     Olegario Fregoso DO

## 2024-09-26 NOTE — ASSESSMENT & PLAN NOTE
Pt was transitioned from methadone to buprenorphine at [facility] in June. She wishes to continue the medication.   We will continue her current dose.   She also has chronic back pain. She will discuss with PCP regarding possible referral to [facility] for non-opioid chronic pain management options.

## 2024-10-16 ENCOUNTER — APPOINTMENT (EMERGENCY)
Dept: RADIOLOGY | Facility: HOSPITAL | Age: 44
End: 2024-10-16
Payer: COMMERCIAL

## 2024-10-16 ENCOUNTER — APPOINTMENT (EMERGENCY)
Dept: CT IMAGING | Facility: HOSPITAL | Age: 44
End: 2024-10-16
Payer: COMMERCIAL

## 2024-10-16 ENCOUNTER — HOSPITAL ENCOUNTER (EMERGENCY)
Facility: HOSPITAL | Age: 44
Discharge: HOME | End: 2024-10-16
Attending: EMERGENCY MEDICINE | Admitting: EMERGENCY MEDICINE
Payer: COMMERCIAL

## 2024-10-16 ENCOUNTER — APPOINTMENT (EMERGENCY)
Dept: NON INVASIVE DIAGNOSTICS | Facility: HOSPITAL | Age: 44
End: 2024-10-16
Payer: COMMERCIAL

## 2024-10-16 VITALS
OXYGEN SATURATION: 100 % | WEIGHT: 280.43 LBS | DIASTOLIC BLOOD PRESSURE: 50 MMHG | TEMPERATURE: 98.1 F | BODY MASS INDEX: 45.26 KG/M2 | HEART RATE: 63 BPM | SYSTOLIC BLOOD PRESSURE: 102 MMHG | RESPIRATION RATE: 18 BRPM

## 2024-10-16 DIAGNOSIS — M25.521 ELBOW PAIN, RIGHT: Primary | ICD-10-CM

## 2024-10-16 LAB
ANION GAP SERPL CALCULATED.3IONS-SCNC: 6 MMOL/L (ref 4–13)
APTT PPP: 32 SECONDS (ref 23–34)
BASOPHILS # BLD AUTO: 0.05 THOUSANDS/ΜL (ref 0–0.1)
BASOPHILS NFR BLD AUTO: 1 % (ref 0–1)
BUN SERPL-MCNC: 13 MG/DL (ref 5–25)
CALCIUM SERPL-MCNC: 9.5 MG/DL (ref 8.4–10.2)
CHLORIDE SERPL-SCNC: 105 MMOL/L (ref 96–108)
CO2 SERPL-SCNC: 26 MMOL/L (ref 21–32)
CREAT SERPL-MCNC: 0.71 MG/DL (ref 0.6–1.3)
CRP SERPL QL: 13 MG/L
EOSINOPHIL # BLD AUTO: 0.15 THOUSAND/ΜL (ref 0–0.61)
EOSINOPHIL NFR BLD AUTO: 2 % (ref 0–6)
ERYTHROCYTE [DISTWIDTH] IN BLOOD BY AUTOMATED COUNT: 14 % (ref 11.6–15.1)
GFR SERPL CREATININE-BSD FRML MDRD: 102 ML/MIN/1.73SQ M
GLUCOSE SERPL-MCNC: 82 MG/DL (ref 65–140)
HCT VFR BLD AUTO: 40.9 % (ref 34.8–46.1)
HGB BLD-MCNC: 13.1 G/DL (ref 11.5–15.4)
IMM GRANULOCYTES # BLD AUTO: 0.02 THOUSAND/UL (ref 0–0.2)
IMM GRANULOCYTES NFR BLD AUTO: 0 % (ref 0–2)
INR PPP: 1.15 (ref 0.85–1.19)
LACTATE SERPL-SCNC: 0.7 MMOL/L (ref 0.5–2)
LYMPHOCYTES # BLD AUTO: 2.98 THOUSANDS/ΜL (ref 0.6–4.47)
LYMPHOCYTES NFR BLD AUTO: 38 % (ref 14–44)
MCH RBC QN AUTO: 26.3 PG (ref 26.8–34.3)
MCHC RBC AUTO-ENTMCNC: 32 G/DL (ref 31.4–37.4)
MCV RBC AUTO: 82 FL (ref 82–98)
MONOCYTES # BLD AUTO: 0.54 THOUSAND/ΜL (ref 0.17–1.22)
MONOCYTES NFR BLD AUTO: 7 % (ref 4–12)
NEUTROPHILS # BLD AUTO: 4.16 THOUSANDS/ΜL (ref 1.85–7.62)
NEUTS SEG NFR BLD AUTO: 52 % (ref 43–75)
NRBC BLD AUTO-RTO: 0 /100 WBCS
PLATELET # BLD AUTO: 336 THOUSANDS/UL (ref 149–390)
PMV BLD AUTO: 8.5 FL (ref 8.9–12.7)
POTASSIUM SERPL-SCNC: 4 MMOL/L (ref 3.5–5.3)
PROCALCITONIN SERPL-MCNC: <0.05 NG/ML
PROTHROMBIN TIME: 14.9 SECONDS (ref 12.3–15)
RBC # BLD AUTO: 4.99 MILLION/UL (ref 3.81–5.12)
SODIUM SERPL-SCNC: 137 MMOL/L (ref 135–147)
WBC # BLD AUTO: 7.9 THOUSAND/UL (ref 4.31–10.16)

## 2024-10-16 PROCEDURE — 73080 X-RAY EXAM OF ELBOW: CPT

## 2024-10-16 PROCEDURE — 85610 PROTHROMBIN TIME: CPT | Performed by: EMERGENCY MEDICINE

## 2024-10-16 PROCEDURE — 93971 EXTREMITY STUDY: CPT | Performed by: SURGERY

## 2024-10-16 PROCEDURE — 84145 PROCALCITONIN (PCT): CPT | Performed by: EMERGENCY MEDICINE

## 2024-10-16 PROCEDURE — 85730 THROMBOPLASTIN TIME PARTIAL: CPT | Performed by: EMERGENCY MEDICINE

## 2024-10-16 PROCEDURE — 86140 C-REACTIVE PROTEIN: CPT | Performed by: EMERGENCY MEDICINE

## 2024-10-16 PROCEDURE — 36415 COLL VENOUS BLD VENIPUNCTURE: CPT | Performed by: EMERGENCY MEDICINE

## 2024-10-16 PROCEDURE — 99285 EMERGENCY DEPT VISIT HI MDM: CPT | Performed by: EMERGENCY MEDICINE

## 2024-10-16 PROCEDURE — 80048 BASIC METABOLIC PNL TOTAL CA: CPT | Performed by: EMERGENCY MEDICINE

## 2024-10-16 PROCEDURE — 83605 ASSAY OF LACTIC ACID: CPT | Performed by: EMERGENCY MEDICINE

## 2024-10-16 PROCEDURE — 96360 HYDRATION IV INFUSION INIT: CPT

## 2024-10-16 PROCEDURE — 99284 EMERGENCY DEPT VISIT MOD MDM: CPT

## 2024-10-16 PROCEDURE — 73200 CT UPPER EXTREMITY W/O DYE: CPT

## 2024-10-16 PROCEDURE — 93971 EXTREMITY STUDY: CPT

## 2024-10-16 PROCEDURE — 96361 HYDRATE IV INFUSION ADD-ON: CPT

## 2024-10-16 PROCEDURE — 85025 COMPLETE CBC W/AUTO DIFF WBC: CPT | Performed by: EMERGENCY MEDICINE

## 2024-10-16 RX ORDER — METHYLPREDNISOLONE 4 MG/1
TABLET ORAL
Qty: 21 TABLET | Refills: 0 | Status: SHIPPED | OUTPATIENT
Start: 2024-10-16

## 2024-10-16 RX ORDER — IBUPROFEN 600 MG/1
600 TABLET, FILM COATED ORAL ONCE
Status: COMPLETED | OUTPATIENT
Start: 2024-10-16 | End: 2024-10-16

## 2024-10-16 RX ORDER — OLANZAPINE 10 MG/1
10 TABLET, ORALLY DISINTEGRATING ORAL ONCE
Status: COMPLETED | OUTPATIENT
Start: 2024-10-16 | End: 2024-10-16

## 2024-10-16 RX ADMIN — SODIUM CHLORIDE 1000 ML: 0.9 INJECTION, SOLUTION INTRAVENOUS at 15:20

## 2024-10-16 RX ADMIN — OLANZAPINE 10 MG: 10 TABLET, ORALLY DISINTEGRATING ORAL at 12:51

## 2024-10-16 RX ADMIN — IBUPROFEN 600 MG: 600 TABLET, FILM COATED ORAL at 12:52

## 2024-10-16 NOTE — ED PROVIDER NOTES
ED Disposition       None          Assessment & Plan       Medical Decision Making  44-year-old female presented for 2-week history of worsening right elbow pain.  No obvious fracture on plain films but given severity of patient's symptoms CT imaging was performed which was also negative for fracture.  No significant joint effusion appreciated on CT.  Upper extremity venous duplex was negative for VTE.  Discussed with orthopedics on-call, given no effusion on CT, no fevers after 2 weeks of symptoms, normal WBC and only modest elevation of CRP, suspicion for septic arthritis is very low.  Patient reports she has not used IV drugs for over 20 years and has no other risk factors for septic arthritis.  Per orthopedics recommendation will start steroid taper and discharge for close outpatient follow-up, recommend patient's schedule appointment with an upper extremity specialist.  Discussed return precautions.    Amount and/or Complexity of Data Reviewed  Labs: ordered. Decision-making details documented in ED Course.  Radiology: ordered and independent interpretation performed. Decision-making details documented in ED Course.    Risk  Prescription drug management.             Medications   ibuprofen (MOTRIN) tablet 600 mg (has no administration in time range)   OLANZapine (ZyPREXA ZYDIS) dispersible tablet 10 mg (has no administration in time range)       ED Risk Strat Scores                           SBIRT 22yo+      Flowsheet Row Most Recent Value   Initial Alcohol Screen: US AUDIT-C     1. How often do you have a drink containing alcohol? 0 Filed at: 10/16/2024 1149   2. How many drinks containing alcohol do you have on a typical day you are drinking?  0 Filed at: 10/16/2024 1149   3a. Male UNDER 65: How often do you have five or more drinks on one occasion? 0 Filed at: 10/16/2024 1149   3b. FEMALE Any Age, or MALE 65+: How often do you have 4 or more drinks on one occassion? 0 Filed at: 10/16/2024 1149   Audit-C  "Score 0 Filed at: 10/16/2024 114   ABDIAS: How many times in the past year have you...    Used an illegal drug or used a prescription medication for non-medical reasons? Never Filed at: 10/16/2024 1146                            History of Present Illness       Chief Complaint   Patient presents with    Elbow Pain     Patient reporting right elbow pain and swelling. States it feels like there is a lump there and that she is unable to extend it. Was told to come here by doctor for a possible CT. Denies injury.       Past Medical History:   Diagnosis Date    Asthma     Hx of blood clots     Psoriasis     Spinal stenosis     URI (upper respiratory infection) 2016      Past Surgical History:   Procedure Laterality Date    APPENDECTOMY      BACK SURGERY       SECTION      x 3    HYSTERECTOMY  2015    Radical      History reviewed. No pertinent family history.   Social History     Tobacco Use    Smoking status: Former     Current packs/day: 0.25     Types: Cigarettes    Smokeless tobacco: Never   Vaping Use    Vaping status: Every Day    Substances: Flavoring   Substance Use Topics    Alcohol use: No    Drug use: Not Currently     Types: Marijuana      E-Cigarette/Vaping    E-Cigarette Use Current Every Day User       E-Cigarette/Vaping Substances    Flavoring Yes       I have reviewed and agree with the history as documented.     Kayce is a pleasant 44-year-old year here for evaluation of right sided elbow pain.  She says that for about 2 weeks now she has had pain in the right elbow associated with a \"lump\" that she can feel on the lateral aspect of the joint.  She reports that she is also noticed swelling of her right upper extremity.  She prefers to hold the arm with elbow flexed at approximately 90 degrees.  She denies any acute injury.  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  Also with history of opiate addiction currently 10+ years sober on Suboxone " therapy.      Elbow Pain  Associated symptoms: no back pain and no fever        Review of Systems   Constitutional:  Negative for chills and fever.   HENT:  Negative for sore throat.    Eyes:  Negative for visual disturbance.   Respiratory:  Negative for cough and shortness of breath.    Cardiovascular:  Negative for chest pain and palpitations.   Gastrointestinal:  Negative for abdominal pain, nausea and vomiting.   Musculoskeletal:  Positive for arthralgias and joint swelling. Negative for back pain.   Skin:  Negative for color change and rash.   All other systems reviewed and are negative.          Objective       ED Triage Vitals [10/16/24 1056]   Temperature Pulse Blood Pressure Respirations SpO2 Patient Position - Orthostatic VS   98.1 °F (36.7 °C) 89 152/76 18 95 % --      Temp Source Heart Rate Source BP Location FiO2 (%) Pain Score    Oral -- -- -- --      Vitals      Date and Time Temp Pulse SpO2 Resp BP Pain Score FACES Pain Rating User   10/16/24 1056 98.1 °F (36.7 °C) 89 95 % 18 152/76 -- -- SL            Physical Exam  Vitals and nursing note reviewed.   Constitutional:       General: She is not in acute distress.     Appearance: She is well-developed.   HENT:      Head: Normocephalic and atraumatic.      Mouth/Throat:      Mouth: Mucous membranes are moist.   Eyes:      Conjunctiva/sclera: Conjunctivae normal.   Cardiovascular:      Rate and Rhythm: Normal rate and regular rhythm.      Heart sounds: No murmur heard.  Pulmonary:      Effort: Pulmonary effort is normal. No respiratory distress.   Abdominal:      General: There is no distension.   Musculoskeletal:         General: Swelling and tenderness present. No deformity.      Cervical back: Neck supple.      Comments: There is mild edema without overlying skin changes to the right elbow.  Patient holds right elbow in flexion and complains of severe pain with extension past 90 degrees.  Easily palpable radial and ulnar pulses with normal distal  sensation and cap refill.   Skin:     General: Skin is warm and dry.      Capillary Refill: Capillary refill takes less than 2 seconds.   Neurological:      General: No focal deficit present.      Mental Status: She is alert and oriented to person, place, and time.   Psychiatric:         Mood and Affect: Mood normal.         Results Reviewed       None            XR elbow 3+ vw RIGHT    (Results Pending)   VAS upper limb venous duplex scan, unilateral/limited    (Results Pending)   CT upper extremity wo contrast right    (Results Pending)       Procedures    ED Medication and Procedure Management   Prior to Admission Medications   Prescriptions Last Dose Informant Patient Reported? Taking?   buprenorphine-naloxone (Suboxone) 8-2 mg 10/16/2024  No Yes   Sig: Place 1 Film (8 mg total) under the tongue 2 (two) times a day   ibuprofen (MOTRIN) 600 mg tablet 10/15/2024  No Yes   Sig: Take 1 tablet (600 mg total) by mouth every 6 (six) hours as needed for mild pain, fever, headaches or moderate pain   lidocaine (LIDODERM) 5 %   No No   Sig: Apply 1 patch topically over 12 hours daily Remove & Discard patch within 12 hours or as directed by MD   meclizine (ANTIVERT) 25 mg tablet More than a month  No No   Sig: Take 1 tablet (25 mg total) by mouth every 8 (eight) hours as needed for dizziness or nausea   naloxone (NARCAN) 4 mg/0.1 mL nasal spray   No No   Sig: Administer 1 spray into a nostril. If no response after 2-3 minutes, give another dose in the other nostril using a new spray.   rivaroxaban (Xarelto) 15 mg tablet   No No   Sig: Take 1 tablet (15 mg total) by mouth 2 (two) times a day for 21 days   rivaroxaban (Xarelto) 20 mg tablet 10/16/2024  No Yes   Sig: Take 1 tablet (20 mg total) by mouth daily with breakfast      Facility-Administered Medications: None     Patient's Medications   Discharge Prescriptions    No medications on file     No discharge procedures on file.  ED SEPSIS DOCUMENTATION            Jason  Eran Mulligan MD  10/16/24 3725

## 2024-10-16 NOTE — Clinical Note
Elaine Omer was seen and treated in our emergency department on 10/16/2024.                Diagnosis:     Elaine  may return to work on return date.    She may return on this date: 10/18/2024    No use of right arm until cleared by orthopedics.     If you have any questions or concerns, please don't hesitate to call.      Jason Mulligan MD    ______________________________           _______________          _______________  Hospital Representative                              Date                                Time

## 2024-10-24 ENCOUNTER — TELEPHONE (OUTPATIENT)
Dept: OTHER | Age: 44
End: 2024-10-24

## 2024-10-24 NOTE — TELEPHONE ENCOUNTER
Attempted to contact pt to reschedule no show 10/24  No answer.  LVM to inform pt they must call office to reschedule appt. Office phone number was provided

## 2024-10-28 NOTE — TELEPHONE ENCOUNTER
Kayce called to reschedule her missed appt due to her son being sick with covid. Rescheduled for 10/30/24.

## 2024-10-31 ENCOUNTER — OFFICE VISIT (OUTPATIENT)
Dept: OTHER | Age: 44
End: 2024-10-31
Payer: COMMERCIAL

## 2024-10-31 VITALS
BODY MASS INDEX: 45.8 KG/M2 | SYSTOLIC BLOOD PRESSURE: 138 MMHG | HEIGHT: 66 IN | HEART RATE: 94 BPM | DIASTOLIC BLOOD PRESSURE: 86 MMHG | WEIGHT: 285 LBS

## 2024-10-31 DIAGNOSIS — F11.21 OPIOID USE DISORDER, SEVERE, IN SUSTAINED REMISSION, DEPENDENCE (HCC): Primary | ICD-10-CM

## 2024-10-31 DIAGNOSIS — Z51.81 ENCOUNTER FOR THERAPEUTIC DRUG LEVEL MONITORING: ICD-10-CM

## 2024-10-31 PROCEDURE — 99214 OFFICE O/P EST MOD 30 MIN: CPT | Performed by: EMERGENCY MEDICINE

## 2024-10-31 RX ORDER — BUPRENORPHINE AND NALOXONE 12; 3 MG/1; MG/1
12 FILM, SOLUBLE BUCCAL; SUBLINGUAL 2 TIMES DAILY
Qty: 60 FILM | Refills: 0 | Status: SHIPPED | OUTPATIENT
Start: 2024-10-31

## 2024-10-31 NOTE — PROGRESS NOTES
[facility]    Progress Note  Elaine Omer 44 y.o. female MRN: [id number]  @ Encounter: [id number]      1. Opioid use disorder, severe, in sustained remission, dependence (HCC)  Assessment & Plan:  Pt reports persistent withdrawal symptoms since transitioning to buprenorphine. She had initially been on 24mg per day at [facility] but they decreased to 16mg daily. She states that she feels runny nose, back pain, restlessness about 8 hours after a dose that resolves when the second dose, but then recurs overnight. This has persisted  Due to the above, we will increase dose to 12mg twice per day and see how that goes.   UDS today for routine monitoring purposes.  Orders:  -     buprenorphine-naloxone (Suboxone) 12-3 MG; Place 1 Film (12 mg total) under the tongue 2 (two) times a day  2. Encounter for therapeutic drug level monitoring  -     [facility] All Prescribed Meds and Special Instructions  -     Amphetamines, Methamphetamines  -     Butalbital  -     Phenobarbital  -     Secobarbital  -     Alprazolam  -     Clonazepam  -     Diazepam, Temazepam, Oxazepam  -     Lorazepam  -     Gabapentin  -     Pregabalin  -     Cocaine  -     Heroin  -     Buprenorphine  -     Levorphanol  -     Meperidine  -     Naltrexone  -     Fentanyl  -     Methadone  -     Oxycodone  -     Tapentadol  -     THC  -     Tramadol  -     Codeine, Hydrocodone, Hydropmorphone, Morphine  -     Bath Salts  -     Ethyl Glucuronide/Ethyl Sulfate  -     Kratom  -     Spice  -     Methylphenidate  -     Phentermine  -     Validity Oxidant  -     Validity Creatinine  -     Validity pH  -     Validity Specific  -     Xylazine Definitive Test        Support Services  Case Management and Certified  are following.   Patient was referred to the [facility] Certified Addiction Counselors: Yes  The patient is actively engaged with the [facility] Certified Addiction Counselor’s psychotherapy plan: If no, explain: [facility]  services only    buprenorphine-naloxone      PDMP reviewed:     PDMP Review         Value Time User    PDMP Reviewed  Yes 10/31/2024  8:20 AM Olegario Fregoso, DO            SUBJECTIVE  See above. Feels persistent withdrawal symptoms in a reliable daily pattern.     Drug cravings: no  Motivation to continue treatment: high      Current Outpatient Medications:     buprenorphine-naloxone (Suboxone) 12-3 MG, Place 1 Film (12 mg total) under the tongue 2 (two) times a day, Disp: 60 Film, Rfl: 0    ibuprofen (MOTRIN) 600 mg tablet, Take 1 tablet (600 mg total) by mouth every 6 (six) hours as needed for mild pain, fever, headaches or moderate pain, Disp: 30 tablet, Rfl: 0    meclizine (ANTIVERT) 25 mg tablet, Take 1 tablet (25 mg total) by mouth every 8 (eight) hours as needed for dizziness or nausea, Disp: 30 tablet, Rfl: 0    methylPREDNISolone 4 MG tablet therapy pack, Use as directed on package, Disp: 21 tablet, Rfl: 0    lidocaine (LIDODERM) 5 %, Apply 1 patch topically over 12 hours daily Remove & Discard patch within 12 hours or as directed by MD (Patient not taking: Reported on 10/31/2024), Disp: 10 patch, Rfl: 0    naloxone (NARCAN) 4 mg/0.1 mL nasal spray, Administer 1 spray into a nostril. If no response after 2-3 minutes, give another dose in the other nostril using a new spray. (Patient not taking: Reported on 10/31/2024), Disp: 1 each, Rfl: 1    rivaroxaban (Xarelto) 15 mg tablet, Take 1 tablet (15 mg total) by mouth 2 (two) times a day for 21 days, Disp: 42 tablet, Rfl: 0    rivaroxaban (Xarelto) 20 mg tablet, Take 1 tablet (20 mg total) by mouth daily with breakfast, Disp: 30 tablet, Rfl: 0    Objective     Vitals:    10/31/24 0838   BP: 138/86   Pulse: 94       Physical Exam  Constitutional:       General: She is not in acute distress.     Appearance: Normal appearance.   Cardiovascular:      Rate and Rhythm: Normal rate.   Pulmonary:      Effort: Pulmonary effort is normal.   Neurological:      Mental  Status: She is alert and oriented to person, place, and time.   Psychiatric:         Mood and Affect: Mood normal.         Behavior: Behavior normal.              Lab Results:   Results from last 6 Months   Lab Units 10/16/24  1523   WBC Thousand/uL 7.90   HEMOGLOBIN g/dL 13.1   HEMATOCRIT % 40.9   PLATELETS Thousands/uL 336      Results from last 6 Months   Lab Units 10/16/24  1523 08/24/24  0844 08/23/24  1517   POTASSIUM mmol/L 4.0   < > 4.0   CHLORIDE mmol/L 105   < > 104   CO2 mmol/L 26   < > 26   BUN mg/dL 13   < > 13   CREATININE mg/dL 0.71   < > 0.72   CALCIUM mg/dL 9.5   < > 9.4   ALBUMIN g/dL  --   --  3.9   ALK PHOS U/L  --   --  75   ALT U/L  --   --  13   AST U/L  --   --  14    < > = values in this interval not displayed.     Results from last 6 Months   Lab Units 08/24/24  0844   HEP C AB  Reactive*   HEP B C IGM  Non-reactive     Results from last 6 Months   Lab Units 08/24/24  0844   HIV-1 P24 ANTIGEN-BIOPLEX  Non-Reactive                 Counseling / Coordination of Care  Total time spent today 15 minutes. Greater than 50% of total time was spent with the patient and / or family counseling and / or coordination of care.     Olegario Fregoso DO

## 2024-10-31 NOTE — ASSESSMENT & PLAN NOTE
Pt reports persistent withdrawal symptoms since transitioning to buprenorphine. She had initially been on 24mg per day at [facility] but they decreased to 16mg daily. She states that she feels runny nose, back pain, restlessness about 8 hours after a dose that resolves when the second dose, but then recurs overnight. This has persisted  Due to the above, we will increase dose to 12mg twice per day and see how that goes.   UDS today for routine monitoring purposes.

## 2024-11-01 ENCOUNTER — OFFICE VISIT (OUTPATIENT)
Dept: OBGYN CLINIC | Facility: CLINIC | Age: 44
End: 2024-11-01
Payer: COMMERCIAL

## 2024-11-01 VITALS — WEIGHT: 285 LBS | HEIGHT: 66 IN | BODY MASS INDEX: 45.8 KG/M2

## 2024-11-01 DIAGNOSIS — M77.11 LATERAL EPICONDYLITIS OF RIGHT ELBOW: Primary | ICD-10-CM

## 2024-11-01 DIAGNOSIS — M25.521 ELBOW PAIN, RIGHT: ICD-10-CM

## 2024-11-01 PROCEDURE — 99203 OFFICE O/P NEW LOW 30 MIN: CPT | Performed by: ORTHOPAEDIC SURGERY
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
## 2024-11-03 LAB
